# Patient Record
Sex: MALE | Race: WHITE | NOT HISPANIC OR LATINO | Employment: OTHER | URBAN - NONMETROPOLITAN AREA
[De-identification: names, ages, dates, MRNs, and addresses within clinical notes are randomized per-mention and may not be internally consistent; named-entity substitution may affect disease eponyms.]

---

## 2021-02-02 ENCOUNTER — APPOINTMENT (OUTPATIENT)
Dept: GENERAL RADIOLOGY | Facility: HOSPITAL | Age: 71
End: 2021-02-02

## 2021-02-02 ENCOUNTER — HOSPITAL ENCOUNTER (INPATIENT)
Facility: HOSPITAL | Age: 71
LOS: 14 days | Discharge: NURSING, REHAB OR LONG-TERM CARE | End: 2021-02-16
Attending: INTERNAL MEDICINE | Admitting: FAMILY MEDICINE

## 2021-02-02 ENCOUNTER — APPOINTMENT (OUTPATIENT)
Dept: CARDIOLOGY | Facility: HOSPITAL | Age: 71
End: 2021-02-02

## 2021-02-02 DIAGNOSIS — Z74.09 IMPAIRED MOBILITY: ICD-10-CM

## 2021-02-02 PROBLEM — J12.82 PNEUMONIA DUE TO COVID-19 VIRUS: Status: ACTIVE | Noted: 2021-02-02

## 2021-02-02 PROBLEM — I10 ESSENTIAL HYPERTENSION: Status: ACTIVE | Noted: 2021-02-02

## 2021-02-02 PROBLEM — C18.9 COLON CANCER (HCC): Status: ACTIVE | Noted: 2017-03-06

## 2021-02-02 PROBLEM — E87.6 HYPOKALEMIA: Status: ACTIVE | Noted: 2017-10-04

## 2021-02-02 PROBLEM — J96.01 ACUTE RESPIRATORY FAILURE WITH HYPOXIA (HCC): Status: ACTIVE | Noted: 2021-02-02

## 2021-02-02 PROBLEM — U07.1 PNEUMONIA DUE TO COVID-19 VIRUS: Status: ACTIVE | Noted: 2021-02-02

## 2021-02-02 LAB
ABO GROUP BLD: NORMAL
ALBUMIN SERPL-MCNC: 3.5 G/DL (ref 3.5–5.2)
ALBUMIN/GLOB SERPL: 1 G/DL
ALP SERPL-CCNC: 58 U/L (ref 39–117)
ALT SERPL W P-5'-P-CCNC: 23 U/L (ref 1–41)
ANION GAP SERPL CALCULATED.3IONS-SCNC: 9 MMOL/L (ref 5–15)
ARTERIAL PATENCY WRIST A: POSITIVE
AST SERPL-CCNC: 31 U/L (ref 1–40)
ATMOSPHERIC PRESS: 757 MMHG
BASE EXCESS BLDA CALC-SCNC: 6.3 MMOL/L (ref 0–2)
BASOPHILS # BLD AUTO: 0.01 10*3/MM3 (ref 0–0.2)
BASOPHILS NFR BLD AUTO: 0.1 % (ref 0–1.5)
BDY SITE: ABNORMAL
BILIRUB CONJ SERPL-MCNC: <0.2 MG/DL (ref 0–0.3)
BILIRUB SERPL-MCNC: 0.3 MG/DL (ref 0–1.2)
BLD GP AB SCN SERPL QL: NEGATIVE
BODY TEMPERATURE: 37 C
BUN SERPL-MCNC: 20 MG/DL (ref 8–23)
BUN/CREAT SERPL: 25.3 (ref 7–25)
CA-I BLD-MCNC: 4.44 MG/DL (ref 4.6–5.4)
CALCIUM SPEC-SCNC: 8.9 MG/DL (ref 8.6–10.5)
CHLORIDE SERPL-SCNC: 97 MMOL/L (ref 98–107)
CO2 SERPL-SCNC: 31 MMOL/L (ref 22–29)
CREAT SERPL-MCNC: 0.79 MG/DL (ref 0.76–1.27)
CRP SERPL-MCNC: 29.57 MG/DL (ref 0–0.5)
D DIMER PPP FEU-MCNC: 0.79 MG/L (FEU) (ref 0–0.5)
D-LACTATE SERPL-SCNC: 1.2 MMOL/L (ref 0.5–2)
DEPRECATED RDW RBC AUTO: 47.6 FL (ref 37–54)
EOSINOPHIL # BLD AUTO: 0 10*3/MM3 (ref 0–0.4)
EOSINOPHIL NFR BLD AUTO: 0 % (ref 0.3–6.2)
ERYTHROCYTE [DISTWIDTH] IN BLOOD BY AUTOMATED COUNT: 14.1 % (ref 12.3–15.4)
FERRITIN SERPL-MCNC: 525 NG/ML (ref 30–400)
GAS FLOW AIRWAY: 15 LPM
GFR SERPL CREATININE-BSD FRML MDRD: 96 ML/MIN/1.73
GLOBULIN UR ELPH-MCNC: 3.4 GM/DL
GLUCOSE SERPL-MCNC: 121 MG/DL (ref 65–99)
HBA1C MFR BLD: 5.7 % (ref 4.8–5.6)
HCO3 BLDA-SCNC: 30.7 MMOL/L (ref 20–26)
HCT VFR BLD AUTO: 48.8 % (ref 37.5–51)
HGB BLD-MCNC: 16.1 G/DL (ref 13–17.7)
IMM GRANULOCYTES # BLD AUTO: 0.06 10*3/MM3 (ref 0–0.05)
IMM GRANULOCYTES NFR BLD AUTO: 0.7 % (ref 0–0.5)
L PNEUMO1 AG UR QL IA: NEGATIVE
LDH SERPL-CCNC: 387 U/L (ref 135–225)
LYMPHOCYTES # BLD AUTO: 0.6 10*3/MM3 (ref 0.7–3.1)
LYMPHOCYTES NFR BLD AUTO: 7.4 % (ref 19.6–45.3)
Lab: ABNORMAL
Lab: ABNORMAL
MAGNESIUM SERPL-MCNC: 2.3 MG/DL (ref 1.6–2.4)
MCH RBC QN AUTO: 30.2 PG (ref 26.6–33)
MCHC RBC AUTO-ENTMCNC: 33 G/DL (ref 31.5–35.7)
MCV RBC AUTO: 91.6 FL (ref 79–97)
MODALITY: ABNORMAL
MONOCYTES # BLD AUTO: 0.2 10*3/MM3 (ref 0.1–0.9)
MONOCYTES NFR BLD AUTO: 2.5 % (ref 5–12)
NEUTROPHILS NFR BLD AUTO: 7.2 10*3/MM3 (ref 1.7–7)
NEUTROPHILS NFR BLD AUTO: 89.3 % (ref 42.7–76)
NRBC BLD AUTO-RTO: 0 /100 WBC (ref 0–0.2)
NT-PROBNP SERPL-MCNC: 298.8 PG/ML (ref 0–900)
PCO2 BLDA: 42.2 MM HG (ref 35–45)
PCO2 TEMP ADJ BLD: 42.2 MM HG (ref 35–45)
PH BLDA: 7.47 PH UNITS (ref 7.35–7.45)
PH, TEMP CORRECTED: 7.47 PH UNITS (ref 7.35–7.45)
PLATELET # BLD AUTO: 199 10*3/MM3 (ref 140–450)
PMV BLD AUTO: 10.1 FL (ref 6–12)
PO2 BLDA: 84.5 MM HG (ref 83–108)
PO2 TEMP ADJ BLD: 84.5 MM HG (ref 83–108)
POTASSIUM SERPL-SCNC: 4 MMOL/L (ref 3.5–5.2)
PROCALCITONIN SERPL-MCNC: 0.24 NG/ML (ref 0–0.25)
PROT SERPL-MCNC: 6.9 G/DL (ref 6–8.5)
RBC # BLD AUTO: 5.33 10*6/MM3 (ref 4.14–5.8)
RH BLD: POSITIVE
S PNEUM AG SPEC QL LA: NEGATIVE
SAO2 % BLDCOA: 97 % (ref 94–99)
SODIUM SERPL-SCNC: 137 MMOL/L (ref 136–145)
T&S EXPIRATION DATE: NORMAL
TROPONIN T SERPL-MCNC: <0.01 NG/ML (ref 0–0.03)
TSH SERPL DL<=0.05 MIU/L-ACNC: 0.71 UIU/ML (ref 0.27–4.2)
VENTILATOR MODE: ABNORMAL
WBC # BLD AUTO: 8.07 10*3/MM3 (ref 3.4–10.8)

## 2021-02-02 PROCEDURE — 85379 FIBRIN DEGRADATION QUANT: CPT | Performed by: INTERNAL MEDICINE

## 2021-02-02 PROCEDURE — 87040 BLOOD CULTURE FOR BACTERIA: CPT | Performed by: INTERNAL MEDICINE

## 2021-02-02 PROCEDURE — 84484 ASSAY OF TROPONIN QUANT: CPT | Performed by: INTERNAL MEDICINE

## 2021-02-02 PROCEDURE — 83880 ASSAY OF NATRIURETIC PEPTIDE: CPT | Performed by: INTERNAL MEDICINE

## 2021-02-02 PROCEDURE — 25010000002 PERFLUTREN 6.52 MG/ML SUSPENSION: Performed by: FAMILY MEDICINE

## 2021-02-02 PROCEDURE — 83036 HEMOGLOBIN GLYCOSYLATED A1C: CPT | Performed by: INTERNAL MEDICINE

## 2021-02-02 PROCEDURE — 82728 ASSAY OF FERRITIN: CPT | Performed by: INTERNAL MEDICINE

## 2021-02-02 PROCEDURE — 84145 PROCALCITONIN (PCT): CPT | Performed by: INTERNAL MEDICINE

## 2021-02-02 PROCEDURE — 86140 C-REACTIVE PROTEIN: CPT | Performed by: INTERNAL MEDICINE

## 2021-02-02 PROCEDURE — 80053 COMPREHEN METABOLIC PANEL: CPT | Performed by: INTERNAL MEDICINE

## 2021-02-02 PROCEDURE — 83615 LACTATE (LD) (LDH) ENZYME: CPT | Performed by: INTERNAL MEDICINE

## 2021-02-02 PROCEDURE — 86901 BLOOD TYPING SEROLOGIC RH(D): CPT | Performed by: INTERNAL MEDICINE

## 2021-02-02 PROCEDURE — 25010000002 ENOXAPARIN PER 10 MG: Performed by: INTERNAL MEDICINE

## 2021-02-02 PROCEDURE — 86850 RBC ANTIBODY SCREEN: CPT | Performed by: INTERNAL MEDICINE

## 2021-02-02 PROCEDURE — 83605 ASSAY OF LACTIC ACID: CPT | Performed by: INTERNAL MEDICINE

## 2021-02-02 PROCEDURE — 94799 UNLISTED PULMONARY SVC/PX: CPT

## 2021-02-02 PROCEDURE — 36600 WITHDRAWAL OF ARTERIAL BLOOD: CPT

## 2021-02-02 PROCEDURE — 63710000001 DEXAMETHASONE PER 0.25 MG: Performed by: INTERNAL MEDICINE

## 2021-02-02 PROCEDURE — 71045 X-RAY EXAM CHEST 1 VIEW: CPT

## 2021-02-02 PROCEDURE — 86900 BLOOD TYPING SEROLOGIC ABO: CPT | Performed by: INTERNAL MEDICINE

## 2021-02-02 PROCEDURE — 87070 CULTURE OTHR SPECIMN AEROBIC: CPT | Performed by: INTERNAL MEDICINE

## 2021-02-02 PROCEDURE — 94640 AIRWAY INHALATION TREATMENT: CPT

## 2021-02-02 PROCEDURE — 82330 ASSAY OF CALCIUM: CPT

## 2021-02-02 PROCEDURE — 25010000002 FUROSEMIDE PER 20 MG: Performed by: FAMILY MEDICINE

## 2021-02-02 PROCEDURE — 87205 SMEAR GRAM STAIN: CPT | Performed by: INTERNAL MEDICINE

## 2021-02-02 PROCEDURE — 87899 AGENT NOS ASSAY W/OPTIC: CPT | Performed by: INTERNAL MEDICINE

## 2021-02-02 PROCEDURE — 82803 BLOOD GASES ANY COMBINATION: CPT

## 2021-02-02 PROCEDURE — 82248 BILIRUBIN DIRECT: CPT | Performed by: INTERNAL MEDICINE

## 2021-02-02 PROCEDURE — 83735 ASSAY OF MAGNESIUM: CPT | Performed by: INTERNAL MEDICINE

## 2021-02-02 PROCEDURE — 93306 TTE W/DOPPLER COMPLETE: CPT | Performed by: INTERNAL MEDICINE

## 2021-02-02 PROCEDURE — 87581 M.PNEUMON DNA AMP PROBE: CPT | Performed by: INTERNAL MEDICINE

## 2021-02-02 PROCEDURE — 93306 TTE W/DOPPLER COMPLETE: CPT

## 2021-02-02 PROCEDURE — 84443 ASSAY THYROID STIM HORMONE: CPT | Performed by: INTERNAL MEDICINE

## 2021-02-02 PROCEDURE — 85025 COMPLETE CBC W/AUTO DIFF WBC: CPT | Performed by: INTERNAL MEDICINE

## 2021-02-02 PROCEDURE — XW13325 TRANSFUSION OF CONVALESCENT PLASMA (NONAUTOLOGOUS) INTO PERIPHERAL VEIN, PERCUTANEOUS APPROACH, NEW TECHNOLOGY GROUP 5: ICD-10-PCS | Performed by: INTERNAL MEDICINE

## 2021-02-02 RX ORDER — BENZONATATE 100 MG/1
200 CAPSULE ORAL EVERY 4 HOURS PRN
Status: DISCONTINUED | OUTPATIENT
Start: 2021-02-02 | End: 2021-02-16 | Stop reason: HOSPADM

## 2021-02-02 RX ORDER — ATORVASTATIN CALCIUM 10 MG/1
10 TABLET, FILM COATED ORAL NIGHTLY
Status: DISCONTINUED | OUTPATIENT
Start: 2021-02-02 | End: 2021-02-16 | Stop reason: HOSPADM

## 2021-02-02 RX ORDER — ACETAMINOPHEN 650 MG/1
650 SUPPOSITORY RECTAL EVERY 4 HOURS PRN
Status: DISCONTINUED | OUTPATIENT
Start: 2021-02-02 | End: 2021-02-16 | Stop reason: HOSPADM

## 2021-02-02 RX ORDER — MULTIVIT AND MINERALS-FERROUS GLUCONATE 9 MG IRON/15 ML ORAL LIQUID 9 MG/15 ML
15 LIQUID (ML) ORAL DAILY
Status: DISCONTINUED | OUTPATIENT
Start: 2021-02-02 | End: 2021-02-16 | Stop reason: HOSPADM

## 2021-02-02 RX ORDER — ASPIRIN 81 MG/1
81 TABLET, CHEWABLE ORAL DAILY
Status: DISCONTINUED | OUTPATIENT
Start: 2021-02-02 | End: 2021-02-16 | Stop reason: HOSPADM

## 2021-02-02 RX ORDER — MELATONIN
1000 DAILY
Status: DISCONTINUED | OUTPATIENT
Start: 2021-02-02 | End: 2021-02-16 | Stop reason: HOSPADM

## 2021-02-02 RX ORDER — ZINC SULFATE 50(220)MG
220 CAPSULE ORAL DAILY
Status: DISCONTINUED | OUTPATIENT
Start: 2021-02-02 | End: 2021-02-16 | Stop reason: HOSPADM

## 2021-02-02 RX ORDER — AMLODIPINE BESYLATE 5 MG/1
5 TABLET ORAL
Status: DISCONTINUED | OUTPATIENT
Start: 2021-02-02 | End: 2021-02-02

## 2021-02-02 RX ORDER — AZITHROMYCIN 250 MG/1
TABLET, FILM COATED ORAL
Status: ON HOLD | COMMUNITY
Start: 2021-01-25 | End: 2021-02-05

## 2021-02-02 RX ORDER — FUROSEMIDE 10 MG/ML
20 INJECTION INTRAMUSCULAR; INTRAVENOUS EVERY 12 HOURS
Status: DISCONTINUED | OUTPATIENT
Start: 2021-02-02 | End: 2021-02-03

## 2021-02-02 RX ORDER — VERAPAMIL HYDROCHLORIDE 180 MG/1
180 CAPSULE, EXTENDED RELEASE ORAL NIGHTLY
Status: ON HOLD | COMMUNITY
End: 2021-02-05

## 2021-02-02 RX ORDER — LANOLIN ALCOHOL/MO/W.PET/CERES
3 CREAM (GRAM) TOPICAL NIGHTLY
Status: DISCONTINUED | OUTPATIENT
Start: 2021-02-02 | End: 2021-02-16 | Stop reason: HOSPADM

## 2021-02-02 RX ORDER — POLYETHYLENE GLYCOL 3350 17 G/17G
17 POWDER, FOR SOLUTION ORAL DAILY
Status: DISCONTINUED | OUTPATIENT
Start: 2021-02-02 | End: 2021-02-16 | Stop reason: HOSPADM

## 2021-02-02 RX ORDER — FAMOTIDINE 20 MG/1
20 TABLET, FILM COATED ORAL
Status: DISCONTINUED | OUTPATIENT
Start: 2021-02-02 | End: 2021-02-16 | Stop reason: HOSPADM

## 2021-02-02 RX ORDER — ASCORBIC ACID 500 MG
500 TABLET ORAL DAILY
Status: DISCONTINUED | OUTPATIENT
Start: 2021-02-02 | End: 2021-02-03

## 2021-02-02 RX ORDER — ASPIRIN 81 MG/1
81 TABLET, CHEWABLE ORAL DAILY
COMMUNITY

## 2021-02-02 RX ORDER — ALBUTEROL SULFATE 90 UG/1
2 AEROSOL, METERED RESPIRATORY (INHALATION) EVERY 4 HOURS PRN
COMMUNITY
Start: 2021-01-29

## 2021-02-02 RX ORDER — ONDANSETRON 2 MG/ML
4 INJECTION INTRAMUSCULAR; INTRAVENOUS EVERY 6 HOURS PRN
Status: DISCONTINUED | OUTPATIENT
Start: 2021-02-02 | End: 2021-02-16 | Stop reason: HOSPADM

## 2021-02-02 RX ORDER — DEXAMETHASONE SODIUM PHOSPHATE 4 MG/ML
6 INJECTION, SOLUTION INTRA-ARTICULAR; INTRALESIONAL; INTRAMUSCULAR; INTRAVENOUS; SOFT TISSUE
Status: DISCONTINUED | OUTPATIENT
Start: 2021-02-02 | End: 2021-02-04

## 2021-02-02 RX ORDER — LABETALOL HYDROCHLORIDE 5 MG/ML
10 INJECTION, SOLUTION INTRAVENOUS EVERY 4 HOURS PRN
Status: DISCONTINUED | OUTPATIENT
Start: 2021-02-02 | End: 2021-02-16 | Stop reason: HOSPADM

## 2021-02-02 RX ORDER — DIPHENOXYLATE HYDROCHLORIDE AND ATROPINE SULFATE 2.5; .025 MG/1; MG/1
1 TABLET ORAL DAILY
COMMUNITY

## 2021-02-02 RX ORDER — ACETAMINOPHEN 325 MG/1
650 TABLET ORAL EVERY 4 HOURS PRN
Status: DISCONTINUED | OUTPATIENT
Start: 2021-02-02 | End: 2021-02-16 | Stop reason: HOSPADM

## 2021-02-02 RX ORDER — ALBUTEROL SULFATE 90 UG/1
2 AEROSOL, METERED RESPIRATORY (INHALATION)
Status: DISCONTINUED | OUTPATIENT
Start: 2021-02-02 | End: 2021-02-16 | Stop reason: HOSPADM

## 2021-02-02 RX ORDER — SODIUM CHLORIDE 0.9 % (FLUSH) 0.9 %
10 SYRINGE (ML) INJECTION AS NEEDED
Status: DISCONTINUED | OUTPATIENT
Start: 2021-02-02 | End: 2021-02-16 | Stop reason: HOSPADM

## 2021-02-02 RX ORDER — VERAPAMIL HYDROCHLORIDE 180 MG/1
180 CAPSULE, EXTENDED RELEASE ORAL EVERY 24 HOURS
Status: DISCONTINUED | OUTPATIENT
Start: 2021-02-02 | End: 2021-02-02

## 2021-02-02 RX ORDER — DOCUSATE SODIUM 100 MG/1
100 CAPSULE, LIQUID FILLED ORAL DAILY
Status: DISCONTINUED | OUTPATIENT
Start: 2021-02-02 | End: 2021-02-16 | Stop reason: HOSPADM

## 2021-02-02 RX ORDER — DEXAMETHASONE 4 MG/1
TABLET ORAL
Status: ON HOLD | COMMUNITY
Start: 2021-02-01 | End: 2021-02-05

## 2021-02-02 RX ORDER — DOXYCYCLINE HYCLATE 100 MG
100 TABLET ORAL
Status: ON HOLD | COMMUNITY
Start: 2021-02-01 | End: 2021-02-05

## 2021-02-02 RX ORDER — VERAPAMIL HYDROCHLORIDE 180 MG/1
180 CAPSULE, EXTENDED RELEASE ORAL EVERY 24 HOURS
Status: DISCONTINUED | OUTPATIENT
Start: 2021-02-02 | End: 2021-02-02 | Stop reason: SDUPTHER

## 2021-02-02 RX ORDER — ONDANSETRON 4 MG/1
4 TABLET, FILM COATED ORAL EVERY 6 HOURS PRN
Status: DISCONTINUED | OUTPATIENT
Start: 2021-02-02 | End: 2021-02-16 | Stop reason: HOSPADM

## 2021-02-02 RX ORDER — HYDROCHLOROTHIAZIDE 25 MG/1
25 TABLET ORAL DAILY
COMMUNITY

## 2021-02-02 RX ORDER — SODIUM CHLORIDE 0.9 % (FLUSH) 0.9 %
10 SYRINGE (ML) INJECTION EVERY 12 HOURS SCHEDULED
Status: DISCONTINUED | OUTPATIENT
Start: 2021-02-02 | End: 2021-02-16 | Stop reason: HOSPADM

## 2021-02-02 RX ORDER — DEXTROMETHORPHAN POLISTIREX 30 MG/5ML
60 SUSPENSION ORAL EVERY 12 HOURS PRN
Status: DISCONTINUED | OUTPATIENT
Start: 2021-02-02 | End: 2021-02-16 | Stop reason: HOSPADM

## 2021-02-02 RX ORDER — HYDROCHLOROTHIAZIDE 25 MG/1
25 TABLET ORAL DAILY
Status: DISCONTINUED | OUTPATIENT
Start: 2021-02-02 | End: 2021-02-16 | Stop reason: HOSPADM

## 2021-02-02 RX ADMIN — OXYCODONE HYDROCHLORIDE AND ACETAMINOPHEN 500 MG: 500 TABLET ORAL at 08:17

## 2021-02-02 RX ADMIN — VERAPAMIL HYDROCHLORIDE 180 MG: 180 TABLET, FILM COATED, EXTENDED RELEASE ORAL at 08:17

## 2021-02-02 RX ADMIN — FAMOTIDINE 20 MG: 20 TABLET, FILM COATED ORAL at 08:17

## 2021-02-02 RX ADMIN — REMDESIVIR 200 MG: 100 INJECTION, POWDER, LYOPHILIZED, FOR SOLUTION INTRAVENOUS at 04:47

## 2021-02-02 RX ADMIN — SODIUM CHLORIDE, PRESERVATIVE FREE 10 ML: 5 INJECTION INTRAVENOUS at 20:27

## 2021-02-02 RX ADMIN — Medication 3 MG: at 20:32

## 2021-02-02 RX ADMIN — SODIUM CHLORIDE, PRESERVATIVE FREE 10 ML: 5 INJECTION INTRAVENOUS at 08:17

## 2021-02-02 RX ADMIN — FAMOTIDINE 20 MG: 20 TABLET, FILM COATED ORAL at 16:06

## 2021-02-02 RX ADMIN — THIAMINE HCL TAB 100 MG 100 MG: 100 TAB at 10:06

## 2021-02-02 RX ADMIN — FUROSEMIDE 20 MG: 10 INJECTION, SOLUTION INTRAMUSCULAR; INTRAVENOUS at 14:48

## 2021-02-02 RX ADMIN — ASPIRIN 81 MG: 81 TABLET, CHEWABLE ORAL at 08:17

## 2021-02-02 RX ADMIN — HYDROCHLOROTHIAZIDE 25 MG: 25 TABLET ORAL at 08:17

## 2021-02-02 RX ADMIN — ATORVASTATIN CALCIUM 10 MG: 10 TABLET, FILM COATED ORAL at 20:27

## 2021-02-02 RX ADMIN — Medication 1000 UNITS: at 08:17

## 2021-02-02 RX ADMIN — ALBUTEROL SULFATE 2 PUFF: 90 AEROSOL, METERED RESPIRATORY (INHALATION) at 13:48

## 2021-02-02 RX ADMIN — DEXAMETHASONE 6 MG: 4 TABLET ORAL at 08:17

## 2021-02-02 RX ADMIN — ACETAMINOPHEN 650 MG: 325 TABLET, FILM COATED ORAL at 22:03

## 2021-02-02 RX ADMIN — ALBUTEROL SULFATE 2 PUFF: 90 AEROSOL, METERED RESPIRATORY (INHALATION) at 10:18

## 2021-02-02 RX ADMIN — ENOXAPARIN SODIUM 40 MG: 40 INJECTION SUBCUTANEOUS at 08:17

## 2021-02-02 RX ADMIN — DOCUSATE SODIUM 100 MG: 100 CAPSULE ORAL at 16:06

## 2021-02-02 RX ADMIN — Medication 15 ML: at 08:17

## 2021-02-02 RX ADMIN — ALBUTEROL SULFATE 2 PUFF: 90 AEROSOL, METERED RESPIRATORY (INHALATION) at 18:56

## 2021-02-02 RX ADMIN — ZINC SULFATE 220 MG (50 MG) CAPSULE 220 MG: CAPSULE at 08:17

## 2021-02-02 RX ADMIN — PERFLUTREN 8.48 MG: 6.52 INJECTION, SUSPENSION INTRAVENOUS at 15:39

## 2021-02-02 RX ADMIN — ALBUTEROL SULFATE 2 PUFF: 90 AEROSOL, METERED RESPIRATORY (INHALATION) at 06:57

## 2021-02-02 RX ADMIN — POLYETHYLENE GLYCOL (3350) 17 G: 17 POWDER, FOR SOLUTION ORAL at 16:06

## 2021-02-02 NOTE — PROGRESS NOTES
[facility] Medicine Services  INPATIENT PROGRESS NOTE    Length of Stay: 0  Date of Admission: 2/2/2021  Primary Care Physician: Meredith Lloyd APRN    Subjective   Chief Complaint: Respiratory failure with hypoxia.  Covid pneumonia.    HPI   Patient breathing is worse.  Patient currently requiring Vapotherm.  Patient denies any chest pain.    Review of Systems   Constitutional: Positive for activity change. Negative for appetite change, chills and fever.   HENT: Negative for hearing loss, nosebleeds, tinnitus and trouble swallowing.    Eyes: Negative for visual disturbance.   Respiratory: Positive for cough and shortness of breath. Negative for chest tightness and wheezing.    Cardiovascular: Negative for chest pain, palpitations and leg swelling.   Gastrointestinal: Negative for abdominal distention, abdominal pain, blood in stool, constipation, diarrhea, nausea and vomiting.   Endocrine: Negative for cold intolerance, heat intolerance, polydipsia, polyphagia and polyuria.   Genitourinary: Negative for decreased urine volume, difficulty urinating, dysuria, flank pain, frequency and hematuria.   Musculoskeletal: Negative for arthralgias, joint swelling and myalgias.   Skin: Negative for rash.   Allergic/Immunologic: Negative for immunocompromised state.   Neurological: Positive for weakness. Negative for dizziness, syncope, light-headedness and headaches.   Hematological: Negative for adenopathy. Does not bruise/bleed easily.   Psychiatric/Behavioral: Negative for confusion and sleep disturbance. The patient is not nervous/anxious.           All pertinent negatives and positives are as above. All other systems have been reviewed and are negative unless otherwise stated.     Objective    Temp:  [96.8 °F (36 °C)-97.7 °F (36.5 °C)] 96.8 °F (36 °C)  Heart Rate:  [69-90] 89  Resp:  [20-22] 21  BP: (136-188)/() 169/101    Intake/Output Summary (Last 24 hours) at 2/2/2021  0854  Last data filed at 2/2/2021 0818  Gross per 24 hour   Intake --   Output 450 ml   Net -450 ml     Physical Exam  Vitals signs and nursing note reviewed.   Constitutional:       Appearance: He is well-developed.   HENT:      Head: Normocephalic and atraumatic.   Eyes:      General: No scleral icterus.     Pupils: Pupils are equal, round, and reactive to light.   Neck:      Musculoskeletal: Normal range of motion and neck supple.      Thyroid: No thyromegaly.      Vascular: No carotid bruit or JVD.      Trachea: No tracheal deviation.   Cardiovascular:      Rate and Rhythm: Normal rate and regular rhythm.      Heart sounds: No murmur. No friction rub. No gallop.    Pulmonary:      Effort: Pulmonary effort is normal. No respiratory distress.      Breath sounds: Normal breath sounds. No wheezing or rales.   Chest:      Chest wall: No tenderness.   Abdominal:      General: Bowel sounds are normal. There is no distension.      Palpations: Abdomen is soft.      Tenderness: There is no abdominal tenderness.      Comments: Obesity.   Musculoskeletal: Normal range of motion.   Skin:     General: Skin is warm and dry.      Capillary Refill: Capillary refill takes 2 to 3 seconds.      Findings: No rash.   Neurological:      Mental Status: He is alert and oriented to person, place, and time.      Cranial Nerves: No cranial nerve deficit.   Psychiatric:         Behavior: Behavior normal.         Results Review:  Lab Results (last 24 hours)     Procedure Component Value Units Date/Time    Respiratory Culture - Sputum, Cough [[id number]] Collected: 02/02/21 0357    Specimen: Sputum from Cough Updated: 02/02/21 0818     Gram Stain Less than 25 WBCs per low power field      Few (2+) Epithelial cells per low power field      Few (2+) Mixed gram positive cb      Few (2+) Gram negative bacilli    Blood Culture - Blood, Arm, Left [[id number]] Collected: 02/02/21 0525    Specimen: Blood from Arm, Left Updated: 02/02/21 0603     "Blood Culture - Blood, Arm, Right [[id number]] Collected: 02/02/21 0525    Specimen: Blood from Arm, Right Updated: 02/02/21 0638    TSH [[id number]]  (Normal) Collected: 02/02/21 0424    Specimen: Blood Updated: 02/02/21 0512     TSH 0.715 uIU/mL     Procalcitonin [[id number]]  (Normal) Collected: 02/02/21 0424    Specimen: Blood Updated: 02/02/21 0511     Procalcitonin 0.24 ng/mL     Narrative:      As a Marker for Sepsis (Non-Neonates):   1. <0.5 ng/mL represents a low risk of severe sepsis and/or septic shock.  1. >2 ng/mL represents a high risk of severe sepsis and/or septic shock.    As a Marker for Lower Respiratory Tract Infections that require antibiotic therapy:  PCT on Admission     Antibiotic Therapy             6-12 Hrs later  > 0.5                Strongly Recommended            >0.25 - <0.5         Recommended  0.1 - 0.25           Discouraged                   Remeasure/reassess PCT  <0.1                 Strongly Discouraged          Remeasure/reassess PCT      As 28 day mortality risk marker: \"Change in Procalcitonin Result\" (> 80 % or <=80 %) if Day 0 (or Day 1) and Day 4 values are available. Refer to http://[email or web address]-pct-calculator.com/   Change in PCT <=80 %   A decrease of PCT levels below or equal to 80 % defines a positive change in PCT test result representing a higher risk for 28-day all-cause mortality of patients diagnosed with severe sepsis or septic shock.  Change in PCT > 80 %   A decrease of PCT levels of more than 80 % defines a negative change in PCT result representing a lower risk for 28-day all-cause mortality of patients diagnosed with severe sepsis or septic shock.                Results may be falsely decreased if patient taking Biotin.     Ferritin [[id number]]  (Abnormal) Collected: 02/02/21 0424    Specimen: Blood Updated: 02/02/21 0508     Ferritin 525.00 ng/mL     Narrative:      Results may be falsely decreased if patient taking Biotin.      Comprehensive Metabolic Panel " [[id number]]  (Abnormal) Collected: 02/02/21 0424    Specimen: Blood Updated: 02/02/21 0506     Glucose 121 mg/dL      BUN 20 mg/dL      Creatinine 0.79 mg/dL      Sodium 137 mmol/L      Potassium 4.0 mmol/L      Chloride 97 mmol/L      CO2 31.0 mmol/L      Calcium 8.9 mg/dL      Total Protein 6.9 g/dL      Albumin 3.50 g/dL      ALT (SGPT) 23 U/L      AST (SGOT) 31 U/L      Alkaline Phosphatase 58 U/L      Total Bilirubin 0.3 mg/dL      eGFR Non African Amer 96 mL/min/1.73      Globulin 3.4 gm/dL      A/G Ratio 1.0 g/dL      BUN/Creatinine Ratio 25.3     Anion Gap 9.0 mmol/L     Narrative:      GFR Normal >60  Chronic Kidney Disease <60  Kidney Failure <15      C-reactive Protein [[id number]]  (Abnormal) Collected: 02/02/21 0424    Specimen: Blood Updated: 02/02/21 0505     C-Reactive Protein 29.57 mg/dL     Lactate Dehydrogenase [[id number]]  (Abnormal) Collected: 02/02/21 0424    Specimen: Blood Updated: 02/02/21 0505      U/L     Bilirubin, Direct [[id number]]  (Normal) Collected: 02/02/21 0424    Specimen: Blood Updated: 02/02/21 0505     Bilirubin, Direct <0.2 mg/dL     Lactic Acid, Plasma [[id number]]  (Normal) Collected: 02/02/21 0424    Specimen: Blood Updated: 02/02/21 0504     Lactate 1.2 mmol/L     Troponin [[id number]]  (Normal) Collected: 02/02/21 0424    Specimen: Blood Updated: 02/02/21 0501     Troponin T <0.010 ng/mL     Narrative:      Troponin T Reference Range:  <= 0.03 ng/mL-   Negative for AMI  >0.03 ng/mL-     Abnormal for myocardial necrosis.  Clinicians would have to utilize clinical acumen, EKG, Troponin and serial changes to determine if it is an Acute Myocardial Infarction or myocardial injury due to an underlying chronic condition.       Results may be falsely decreased if patient taking Biotin.      BNP [[id number]]  (Normal) Collected: 02/02/21 0424    Specimen: Blood Updated: 02/02/21 0500     proBNP 298.8 pg/mL     Narrative:      Among patients with dyspnea, NT-proBNP is highly  sensitive for the detection of acute congestive heart failure. In addition NT-proBNP of <300 pg/ml effectively rules out acute congestive heart failure with 99% negative predictive value.    Results may be falsely decreased if patient taking Biotin.      Magnesium [[id number]]  (Normal) Collected: 02/02/21 0424    Specimen: Blood Updated: 02/02/21 0500     Magnesium 2.3 mg/dL     D-dimer, Quantitative [[id number]]  (Abnormal) Collected: 02/02/21 0424    Specimen: Blood Updated: 02/02/21 0456     D-Dimer, Quantitative 0.79 mg/L (FEU)     Narrative:      Reference Range is 0-0.50 mg/L FEU. However, results <0.50 mg/L FEU tends to rule out DVT or PE. Results >0.50 mg/L FEU are not useful in predicting absence or presence of DVT or PE.      Hemoglobin A1c [[id number]]  (Abnormal) Collected: 02/02/21 0424    Specimen: Blood Updated: 02/02/21 0453     Hemoglobin A1C 5.70 %     Narrative:      Hemoglobin A1C Ranges:    Increased Risk for Diabetes  5.7% to 6.4%  Diabetes                     >= 6.5%  Diabetic Goal                < 7.0%    CBC Auto Differential [[id number]]  (Abnormal) Collected: 02/02/21 0424    Specimen: Blood Updated: 02/02/21 0443     WBC 8.07 10*3/mm3      RBC 5.33 10*6/mm3      Hemoglobin 16.1 g/dL      Hematocrit 48.8 %      MCV 91.6 fL      MCH 30.2 pg      MCHC 33.0 g/dL      RDW 14.1 %      RDW-SD 47.6 fl      MPV 10.1 fL      Platelets 199 10*3/mm3      Neutrophil % 89.3 %      Lymphocyte % 7.4 %      Monocyte % 2.5 %      Eosinophil % 0.0 %      Basophil % 0.1 %      Immature Grans % 0.7 %      Neutrophils, Absolute 7.20 10*3/mm3      Lymphocytes, Absolute 0.60 10*3/mm3      Monocytes, Absolute 0.20 10*3/mm3      Eosinophils, Absolute 0.00 10*3/mm3      Basophils, Absolute 0.01 10*3/mm3      Immature Grans, Absolute 0.06 10*3/mm3      nRBC 0.0 /100 WBC     Mycoplasma Pneumoniae PCR - Swab, Nasopharynx [[id number]] Collected: 02/02/21 0411    Specimen: Swab from Nasopharynx Updated: 02/02/21 0434     Legionella Antigen, Urine - Urine, Urine, Clean Catch [[id number]]  (Normal) Collected: 02/02/21 0355    Specimen: Urine, Clean Catch Updated: 02/02/21 0431     LEGIONELLA ANTIGEN, URINE Negative    S. Pneumo Ag Urine or CSF - Urine, Urine, Clean Catch [[id number]]  (Normal) Collected: 02/02/21 0355    Specimen: Urine, Clean Catch Updated: 02/02/21 0431     Strep Pneumo Ag Negative    Calcium, Ionized [[id number]]  (Abnormal) Collected: 02/02/21 0416    Specimen: Blood Updated: 02/02/21 0429     Ionized Calcium 4.44 mg/dL      Comment: 84 Value below reference range        Collected by [id number]     Comment: Meter: [id number]     :  [id number]       Blood Gas, Arterial - [[id number]]  (Abnormal) Collected: 02/02/21 0416    Specimen: Arterial Blood Updated: 02/02/21 0429     Site Right Radial     Jose's Test Positive     pH, Arterial 7.471 pH units      Comment: 83 Value above reference range        pCO2, Arterial 42.2 mm Hg      pO2, Arterial 84.5 mm Hg      HCO3, Arterial 30.7 mmol/L      Comment: 83 Value above reference range        Base Excess, Arterial 6.3 mmol/L      Comment: 83 Value above reference range        O2 Saturation, Arterial 97.0 %      Temperature 37.0 C      Barometric Pressure for Blood Gas 757 mmHg      Modality HFNC     Flow Rate 15.0 lpm      Ventilator Mode NA     Collected by [id number]     Comment: Meter: [id number]     :  [id number]        pCO2, Temperature Corrected 42.2 mm Hg      pH, Temp Corrected 7.471 pH Units      pO2, Temperature Corrected 84.5 mm Hg            Cultures:  No results found for: BLOODCX, URINECX, WOUNDCX, MRSACX, RESPCX, STOOLCX    Radiology Data:    Imaging Results (Last 24 Hours)     ** No results found for the last 24 hours. **          No Known Allergies    Scheduled meds:   albuterol sulfate HFA, 2 puff, Inhalation, 4x Daily - RT  ascorbic acid, 500 mg, Oral, Daily  aspirin, 81 mg, Oral, Daily  atorvastatin, 10 mg, Oral, Nightly  cholecalciferol,  1,000 Units, Oral, Daily  dexamethasone, 6 mg, Oral, Daily With Breakfast    Or  dexamethasone, 6 mg, Intravenous, Daily With Breakfast  enoxaparin, 40 mg, Subcutaneous, Q24H  famotidine, 20 mg, Oral, BID AC  hydroCHLOROthiazide, 25 mg, Oral, Daily  melatonin, 3 mg, Oral, Nightly  multivitamin and minerals, 15 mL, Oral, Daily  [START ON 2/3/2021] remdesivir, 100 mg, Intravenous, Q24H  sodium chloride, 10 mL, Intravenous, Q12H  verapamil SR, 180 mg, Oral, Q24H  zinc sulfate, 220 mg, Oral, Daily        PRN meds:  •  acetaminophen **OR** acetaminophen  •  benzonatate  •  dextromethorphan polistirex ER  •  ondansetron **OR** ondansetron  •  Pharmacy Consult - Remdesivir  •  sodium chloride    Assessment/Plan       Essential hypertension    Pneumonia due to COVID-19 virus    Acute respiratory failure with hypoxia (CMS/HCC)      Plan:    COVID-19 pneumonia/respiratory failure with hypoxia.  Previously was on antibiotics and Decadron outpatient.  Zinc.  Vitamin C.  Vitamin D.  Melatonin at night. Continue Decadron.  Continue remdesivir.  S/P Convalescent plasma x1.  Tessalon Perle.  Delsym.  Consult pulmonary.  Consult infectious disease.  CT scan and x-[facility] at [facility] -bilateral infiltrate consistent with Covid.  D-dimer was elevated, no PE per CTA.  Vapotherm 30/95.  Chest x-ray-Bilateral mid to lower lung zone opacities in the background of cardiomegaly- consider fluid overload- Superimposed infection is not excluded.    Hypertension.  Continue verapamil.  Continue hydrochlorthiazide.  Continue Lipitor.  Labetalol as needed.  Echocardiogram-preliminary ejection fraction 64%, pending official radiologist reading.    Reflux.  Pepcid.  Zofran as needed.    Obesity.  BMI is 38.    History colon cancer.    Lovenox prophylaxis.    Nutrition.  Regular/cardiac diet.    Blood cultures-no growth 24 hours.  Legionella antigen-negative.  Respiratory culture pending.  Strep pneumo-negative.   Mycoplasma-pending.    Discharge Planning: 3 to 6 days.    Electronically signed by Aime Rivera MD, 02/02/21, 8:54 AM CST.

## 2021-02-02 NOTE — PLAN OF CARE
Goal Outcome Evaluation:    Pt arrived by air-evac around midnight last night. From report that was given from ED nurse in [place] it was stated the pt was on 6L NC, however when pt arrived to us he was on 15L non-rebreather sating low 90s. Pt currently is on 14L salter high flow nasal cannula sat mid-90s, no complaints of SOB. He does have frequent and productive cough, sputum culture was obtained and sent down to lab. He is very friendly and very cooperative. Alert and oriented x4. Sinus rhythm 70-80s. BP has started becoming more elevated 160-180s, Dr. Orellana reorderd pts home med for hypertension so he should receive that with his morning meds.     Remdesevir has been given, conv. Plasma has been ordered but lab does not have his type so they are having to order that currently, and decadron will be started today.

## 2021-02-02 NOTE — H&P
[facility] Medicine Services  HISTORY AND PHYSICAL    Date of Admission: 2/2/2021  Primary Care Physician: Meredith Lloyd APRN    Subjective     Chief Complaint: Cough shortness of breath    History of Present Illness  Patient is 70-year-old white male past medical history of hypertension arthritis and colon cancer.  He presents with an 8-day history of low-grade fevers cough congestion watery eyes shortness of breath.  He had been treated by his primary care doctor with a believe a Z-Rashid and/or doxycycline or both as well as started on Decadron p.o.  He is also been taking zinc and vitamin C.  He presented to [facility] due to worsening shortness of breath.  His sat was 81% on room air.  He had chest x-rays and CT scans of the chest that showed bilateral interstitial infiltrates consistent with Covid.  D-dimers were elevated but no PEs were noted.  His Covid test is positive.  He was also tested by his physician earlier in the day but the test was not back yet.  Patient denies nausea vomiting or diarrhea he does state he has sense of taste and smell of not changed either.  He has been coughing up brown sputum.  Rest of his laboratory showed a pH of 7.48 PCO2 39 PO2 of 41 bicarb 29 sat 81% on room air.  LDH was elevated at 454 ferritin was elevated 428.  White counts 9.2 hemoglobin 16.1 hematocrit 46 platelets 188,000 with 92 segs 4.2% lymphocytes 2.4% monocytes.  D-dimer is 1.06 (CTA negative for PE) his troponin was negative his AST was 40.  His albumin was low at 2.7 his glucose was 122 BUN slightly elevated at 19 with a creatinine of 0.85 respectively.  Due to the fact they had no ICU beds at the other facility we have been asked accept him in transfer.  Patient is now on 15 L satting about 92%.  He seems to be resting comfortably currently.        Review of Systems   14 point review of systems negative except for as per HPI    Otherwise  "complete ROS reviewed and negative except as mentioned in the HPI.    Past Medical History:   Past Medical History:   Diagnosis Date   • Arthritis    • Cancer (CMS/HCC)    • Colon cancer (CMS/HCC)    • Hypertension    • Skin cancer      Past Surgical History:  Past Surgical History:   Procedure Laterality Date   • COLON SURGERY     • JOINT REPLACEMENT     • SKIN CANCER EXCISION       Social History:  reports that he has never smoked. He quit smokeless tobacco use about 4 years ago.    Family History: family history includes Coronary artery disease in his father; Hypertension in his mother.       Allergies:  No Known Allergies  Medications:  Prior to Admission medications    Medication Sig Start Date End Date Taking? Authorizing Provider   albuterol sulfate  (90 Base) MCG/ACT inhaler  1/29/21  Yes Chriss Tinoco MD   azithromycin (ZITHROMAX) 250 MG tablet Take  by mouth. 1/25/21  Yes Chriss Tinoco MD   dexamethasone (DECADRON) 4 MG tablet  2/1/21  Yes Chriss Tinoco MD   doxycycline (VIBRAMYICN) 100 MG tablet Take 100 mg by mouth. 2/1/21  Yes Chriss Tinoco MD   aspirin (Aspirin Adult Low Strength) 81 MG chewable tablet Daily.    Chriss Tinoco MD   hydroCHLOROthiazide (HYDRODIURIL) 25 MG tablet Take 25 mg by mouth Daily.    Chriss Tinoco MD   multivitamin (THERAGRAN) tablet tablet Take 1 tablet by mouth Daily.    Chriss Tinoco MD   Potassium 75 MG tablet 1 tab    Chriss Tinoco MD   verapamil ER (VERELAN) 180 MG 24 hr capsule Daily.    Chriss Tinoco MD     Objective     Vital Signs: BP (!) 169/104   Pulse 77   Temp 97.4 °F (36.3 °C) (Axillary)   Resp 22   Ht 162.6 cm (64\")   Wt 103 kg (226 lb 10.1 oz)   SpO2 95%   BMI 38.90 kg/m²   Physical Exam   Gen.:  Well-nourished well-developed white male in no acute distress  HEENT: Atraumatic, normocephalic.  Pupils equal, round, and reactive to light. Extraocular movements intact.  Sclerae " anicteric.  External ears negative.  Mucous membranes moist.  Neck is supple without lymphadenopathy.  No JVD is noted.  No carotid bruits are auscultated.  Oropharynx is without erythema or exudate.   Chest: Clear to auscultation and percussion.  CV: Regular rate and rhythm.  Normal S1-S2.  No gallops, murmurs. or rubs.  Abdomen: Soft, nontender, nondistended.  Active bowel sounds,  No hepatosplenomegaly.  No masses.  No hernias.  Extremities: No clubbing, edema, or cyanosis.  Capillary refill is normal.  Pulses are 2+ and symmetric at radial and dorsalis pedis.  Posterior tibial pulses are intact and 2+ palpable.  Neuro: Patient is awake, alert, and oriented ×3.  Cranial nerves II through XII are grossly intact.  Motor is 5 out of 5 bilaterally.  DTRs are 2+ and symmetric bilaterally. Sensory exam is nonfocal  Skin: Warm, dry, and intact.  No evidence of breakdown.  Sensorium: Normal thought and affect    Nursing notes and vital signs reviewed        Results Reviewed:  Lab Results (last 24 hours)     ** No results found for the last 24 hours. **        Imaging Results (Last 24 Hours)     ** No results found for the last 24 hours. **        I have personally reviewed and interpreted the radiology studies and ECG obtained at time of admission.     Assessment / Plan     Assessment:   Active Hospital Problems    Diagnosis   • Pneumonia due to COVID-19 virus   • Acute respiratory failure with hypoxia (CMS/HCC)   • Essential hypertension   1.  Pneumonia due to COVID-19 virus plan is to admit the patient placed in respiratory isolation.  He will be in the ICU for now.  Supplemental O2 will be given.  We will culture blood and sputum.  Will start patient on Decadron as well as remdesivir and give convalescent plasma.  Patient will also be started on atorvastatin, melatonin, Pepcid, vitamin C, vitamin D, and zinc.  Pulmonary toilet with Mucinex and albuterol MDI inhaler.  Consider pulmonary consultation as well as  infectious disease consultation.  2.  Acute respiratory hypoxia supplemental O2 as above plans as outlined above.  3.  Hypertension resume verapamil and HCTZ.  Monitor labs monitor BP closely.      Patient seen after midnight         Code Status: Full, although the patient is considering changing his status he will decide tomorrow.     I discussed the patient's findings and my recommendations with the patient.  He designates his wife is his surrogate healthcare decision maker.    Estimated length of stay greater than 2 nights.    Patient seen and examined by me on February 2, 2021 at 12:30 AM.    Electronically signed by Zac Orellana MD, 02/02/21, 03:11 CST.

## 2021-02-02 NOTE — PROGRESS NOTES
Discharge Planning Assessment  [facility]     Patient Name: Santiago Spence  MRN: [id number]  Today's Date: 2/2/2021    Admit Date: 2/2/2021    Discharge Needs Assessment     Row Name 02/02/21 1117       Living Environment    Lives With  spouse    Name(s) of Who Lives With Patient  Wife- Andra    Current Living Arrangements  home/apartment/condo    Primary Care Provided by  self    Provides Primary Care For  no one    Family Caregiver if Needed  spouse    Quality of Family Relationships  supportive;involved;helpful    Able to Return to Prior Arrangements  yes       Resource/Environmental Concerns    Resource/Environmental Concerns  none       Transition Planning    Patient/Family Anticipates Transition to  home with family    Patient/Family Anticipated Services at Transition  durable medical equipment    Transportation Anticipated  family or friend will provide       Discharge Needs Assessment    Readmission Within the Last 30 Days  no previous admission in last 30 days    Concerns to be Addressed  basic needs    Anticipated Changes Related to Illness  none    Equipment Needed After Discharge  oxygen        Discharge Plan     Row Name 02/02/21 1113       Plan    Plan  Home    Patient/Family in Agreement with Plan  yes    Plan Comments  Spoke with pt's wife- Andra to assess for home needs. Pt will return home with wife as before.  Pt was independent before being diagnosed with Covid.  Pt has RX coverage/PCP.  Pt might need home O2 arranged if still requiring at d/c. Will follow.        Continued Care and Services - Admitted Since 2/2/2021    Coordination has not been started for this encounter.         Demographic Summary    No documentation.       Functional Status    No documentation.       Psychosocial    No documentation.       Abuse/Neglect    No documentation.       Legal    No documentation.       Substance Abuse    No documentation.       Patient Forms    No documentation.           SOFI Patel

## 2021-02-03 LAB
ALBUMIN SERPL-MCNC: 3.3 G/DL (ref 3.5–5.2)
ALBUMIN/GLOB SERPL: 0.9 G/DL
ALP SERPL-CCNC: 57 U/L (ref 39–117)
ALT SERPL W P-5'-P-CCNC: 20 U/L (ref 1–41)
ANION GAP SERPL CALCULATED.3IONS-SCNC: 11 MMOL/L (ref 5–15)
ARTERIAL PATENCY WRIST A: POSITIVE
ARTERIAL PATENCY WRIST A: POSITIVE
AST SERPL-CCNC: 38 U/L (ref 1–40)
ATMOSPHERIC PRESS: 752 MMHG
ATMOSPHERIC PRESS: 756 MMHG
BASE EXCESS BLDA CALC-SCNC: 6 MMOL/L (ref 0–2)
BASE EXCESS BLDA CALC-SCNC: 6.7 MMOL/L (ref 0–2)
BASOPHILS # BLD AUTO: 0.02 10*3/MM3 (ref 0–0.2)
BASOPHILS NFR BLD AUTO: 0.2 % (ref 0–1.5)
BDY SITE: ABNORMAL
BDY SITE: ABNORMAL
BH BB BLOOD EXPIRATION DATE: NORMAL
BH BB BLOOD TYPE BARCODE: 9500
BH BB DISPENSE STATUS: NORMAL
BH BB PRODUCT CODE: NORMAL
BH BB UNIT NUMBER: NORMAL
BH CV ECHO MEAS - AO MAX PG (FULL): 7.3 MMHG
BH CV ECHO MEAS - AO MAX PG: 10.9 MMHG
BH CV ECHO MEAS - AO MEAN PG (FULL): 5 MMHG
BH CV ECHO MEAS - AO MEAN PG: 7 MMHG
BH CV ECHO MEAS - AO ROOT AREA (BSA CORRECTED): 1.7
BH CV ECHO MEAS - AO ROOT AREA: 9.1 CM^2
BH CV ECHO MEAS - AO ROOT DIAM: 3.4 CM
BH CV ECHO MEAS - AO V2 MAX: 165 CM/SEC
BH CV ECHO MEAS - AO V2 MEAN: 121 CM/SEC
BH CV ECHO MEAS - AO V2 VTI: 32 CM
BH CV ECHO MEAS - AVA(I,A): 2.2 CM^2
BH CV ECHO MEAS - AVA(I,D): 2.2 CM^2
BH CV ECHO MEAS - AVA(V,A): 2 CM^2
BH CV ECHO MEAS - AVA(V,D): 2 CM^2
BH CV ECHO MEAS - BSA(HAYCOCK): 2.2 M^2
BH CV ECHO MEAS - BSA: 2.1 M^2
BH CV ECHO MEAS - BZI_BMI: 38.8 KILOGRAMS/M^2
BH CV ECHO MEAS - BZI_METRIC_HEIGHT: 162.6 CM
BH CV ECHO MEAS - BZI_METRIC_WEIGHT: 102.5 KG
BH CV ECHO MEAS - EDV(CUBED): 338.6 ML
BH CV ECHO MEAS - EDV(MOD-SP4): 186 ML
BH CV ECHO MEAS - EDV(TEICH): 253 ML
BH CV ECHO MEAS - EF(CUBED): 52.5 %
BH CV ECHO MEAS - EF(MOD-SP4): 64.3 %
BH CV ECHO MEAS - EF(TEICH): 43.2 %
BH CV ECHO MEAS - ESV(CUBED): 161 ML
BH CV ECHO MEAS - ESV(MOD-SP4): 66.4 ML
BH CV ECHO MEAS - ESV(TEICH): 143.7 ML
BH CV ECHO MEAS - FS: 22 %
BH CV ECHO MEAS - IVS/LVPW: 0.96
BH CV ECHO MEAS - IVSD: 1.4 CM
BH CV ECHO MEAS - LA DIMENSION: 3.9 CM
BH CV ECHO MEAS - LA/AO: 1.1
BH CV ECHO MEAS - LAT PEAK E' VEL: 4.3 CM/SEC
BH CV ECHO MEAS - LV DIASTOLIC VOL/BSA (35-75): 90.3 ML/M^2
BH CV ECHO MEAS - LV MASS(C)D: 490 GRAMS
BH CV ECHO MEAS - LV MASS(C)DI: 237.8 GRAMS/M^2
BH CV ECHO MEAS - LV MAX PG: 3.6 MMHG
BH CV ECHO MEAS - LV MEAN PG: 2 MMHG
BH CV ECHO MEAS - LV SYSTOLIC VOL/BSA (12-30): 32.2 ML/M^2
BH CV ECHO MEAS - LV V1 MAX: 94.6 CM/SEC
BH CV ECHO MEAS - LV V1 MEAN: 66 CM/SEC
BH CV ECHO MEAS - LV V1 VTI: 20.7 CM
BH CV ECHO MEAS - LVIDD: 7 CM
BH CV ECHO MEAS - LVIDS: 5.4 CM
BH CV ECHO MEAS - LVLD AP4: 10.5 CM
BH CV ECHO MEAS - LVLS AP4: 8.8 CM
BH CV ECHO MEAS - LVOT AREA (M): 3.5 CM^2
BH CV ECHO MEAS - LVOT AREA: 3.5 CM^2
BH CV ECHO MEAS - LVOT DIAM: 2.1 CM
BH CV ECHO MEAS - LVPWD: 1.4 CM
BH CV ECHO MEAS - MED PEAK E' VEL: 6 CM/SEC
BH CV ECHO MEAS - MV A MAX VEL: 70.8 CM/SEC
BH CV ECHO MEAS - MV DEC SLOPE: 518 CM/SEC^2
BH CV ECHO MEAS - MV DEC TIME: 0.25 SEC
BH CV ECHO MEAS - MV E MAX VEL: 49.5 CM/SEC
BH CV ECHO MEAS - MV E/A: 0.7
BH CV ECHO MEAS - MV P1/2T MAX VEL: 106 CM/SEC
BH CV ECHO MEAS - MV P1/2T: 59.9 MSEC
BH CV ECHO MEAS - MVA P1/2T LCG: 2.1 CM^2
BH CV ECHO MEAS - MVA(P1/2T): 3.7 CM^2
BH CV ECHO MEAS - PA MAX PG: 4 MMHG
BH CV ECHO MEAS - PA V2 MAX: 100 CM/SEC
BH CV ECHO MEAS - SI(AO): 141 ML/M^2
BH CV ECHO MEAS - SI(CUBED): 86.2 ML/M^2
BH CV ECHO MEAS - SI(LVOT): 34.8 ML/M^2
BH CV ECHO MEAS - SI(MOD-SP4): 58 ML/M^2
BH CV ECHO MEAS - SI(TEICH): 53 ML/M^2
BH CV ECHO MEAS - SV(AO): 290.5 ML
BH CV ECHO MEAS - SV(CUBED): 177.6 ML
BH CV ECHO MEAS - SV(LVOT): 71.7 ML
BH CV ECHO MEAS - SV(MOD-SP4): 119.6 ML
BH CV ECHO MEAS - SV(TEICH): 109.2 ML
BH CV ECHO MEASUREMENTS AVERAGE E/E' RATIO: 9.61
BILIRUB SERPL-MCNC: 0.4 MG/DL (ref 0–1.2)
BODY TEMPERATURE: 37 C
BODY TEMPERATURE: 37 C
BUN SERPL-MCNC: 29 MG/DL (ref 8–23)
BUN/CREAT SERPL: 34.5 (ref 7–25)
CALCIUM SPEC-SCNC: 8.8 MG/DL (ref 8.6–10.5)
CHLORIDE SERPL-SCNC: 97 MMOL/L (ref 98–107)
CO2 SERPL-SCNC: 28 MMOL/L (ref 22–29)
CREAT SERPL-MCNC: 0.84 MG/DL (ref 0.76–1.27)
D DIMER PPP FEU-MCNC: 0.81 MG/L (FEU) (ref 0–0.5)
DEPRECATED RDW RBC AUTO: 47.3 FL (ref 37–54)
EOSINOPHIL # BLD AUTO: 0 10*3/MM3 (ref 0–0.4)
EOSINOPHIL NFR BLD AUTO: 0 % (ref 0.3–6.2)
EPAP: 8
ERYTHROCYTE [DISTWIDTH] IN BLOOD BY AUTOMATED COUNT: 14.2 % (ref 12.3–15.4)
FIBRINOGEN PPP-MCNC: 748 MG/DL (ref 240–460)
GAS FLOW AIRWAY: 30 LPM
GFR SERPL CREATININE-BSD FRML MDRD: 90 ML/MIN/1.73
GLOBULIN UR ELPH-MCNC: 3.6 GM/DL
GLUCOSE SERPL-MCNC: 115 MG/DL (ref 65–99)
HCO3 BLDA-SCNC: 30.2 MMOL/L (ref 20–26)
HCO3 BLDA-SCNC: 30.8 MMOL/L (ref 20–26)
HCT VFR BLD AUTO: 47.9 % (ref 37.5–51)
HGB BLD-MCNC: 16.2 G/DL (ref 13–17.7)
IMM GRANULOCYTES # BLD AUTO: 0.06 10*3/MM3 (ref 0–0.05)
IMM GRANULOCYTES NFR BLD AUTO: 0.6 % (ref 0–0.5)
INHALED O2 CONCENTRATION: 100 %
INHALED O2 CONCENTRATION: 100 %
IPAP: 16
LDH SERPL-CCNC: 517 U/L (ref 135–225)
LEFT ATRIUM VOLUME INDEX: 30 ML/M2
LYMPHOCYTES # BLD AUTO: 1.03 10*3/MM3 (ref 0.7–3.1)
LYMPHOCYTES NFR BLD AUTO: 9.5 % (ref 19.6–45.3)
Lab: ABNORMAL
Lab: ABNORMAL
MAXIMAL PREDICTED HEART RATE: 148 BPM
MCH RBC QN AUTO: 30.5 PG (ref 26.6–33)
MCHC RBC AUTO-ENTMCNC: 33.8 G/DL (ref 31.5–35.7)
MCV RBC AUTO: 90.2 FL (ref 79–97)
MODALITY: ABNORMAL
MODALITY: ABNORMAL
MONOCYTES # BLD AUTO: 0.3 10*3/MM3 (ref 0.1–0.9)
MONOCYTES NFR BLD AUTO: 2.8 % (ref 5–12)
NEUTROPHILS NFR BLD AUTO: 86.9 % (ref 42.7–76)
NEUTROPHILS NFR BLD AUTO: 9.42 10*3/MM3 (ref 1.7–7)
NRBC BLD AUTO-RTO: 0 /100 WBC (ref 0–0.2)
PCO2 BLDA: 38 MM HG (ref 35–45)
PCO2 BLDA: 43.5 MM HG (ref 35–45)
PCO2 TEMP ADJ BLD: 38 MM HG (ref 35–45)
PCO2 TEMP ADJ BLD: 43.5 MM HG (ref 35–45)
PH BLDA: 7.46 PH UNITS (ref 7.35–7.45)
PH BLDA: 7.51 PH UNITS (ref 7.35–7.45)
PH, TEMP CORRECTED: 7.46 PH UNITS (ref 7.35–7.45)
PH, TEMP CORRECTED: 7.51 PH UNITS (ref 7.35–7.45)
PLATELET # BLD AUTO: 233 10*3/MM3 (ref 140–450)
PMV BLD AUTO: 10.3 FL (ref 6–12)
PO2 BLDA: 58.5 MM HG (ref 83–108)
PO2 BLDA: 84 MM HG (ref 83–108)
PO2 TEMP ADJ BLD: 58.5 MM HG (ref 83–108)
PO2 TEMP ADJ BLD: 84 MM HG (ref 83–108)
POTASSIUM SERPL-SCNC: 4.1 MMOL/L (ref 3.5–5.2)
PROT SERPL-MCNC: 6.9 G/DL (ref 6–8.5)
RBC # BLD AUTO: 5.31 10*6/MM3 (ref 4.14–5.8)
SAO2 % BLDCOA: 92.1 % (ref 94–99)
SAO2 % BLDCOA: 96.6 % (ref 94–99)
SET MECH RESP RATE: 20
SODIUM SERPL-SCNC: 136 MMOL/L (ref 136–145)
STRESS TARGET HR: 126 BPM
UNIT  ABO: NORMAL
UNIT  RH: NORMAL
VENTILATOR MODE: ABNORMAL
VENTILATOR MODE: ABNORMAL
WBC # BLD AUTO: 10.83 10*3/MM3 (ref 3.4–10.8)

## 2021-02-03 PROCEDURE — 85379 FIBRIN DEGRADATION QUANT: CPT | Performed by: INTERNAL MEDICINE

## 2021-02-03 PROCEDURE — 25010000002 DEXAMETHASONE PER 1 MG: Performed by: INTERNAL MEDICINE

## 2021-02-03 PROCEDURE — 94660 CPAP INITIATION&MGMT: CPT

## 2021-02-03 PROCEDURE — 3E0333Z INTRODUCTION OF ANTI-INFLAMMATORY INTO PERIPHERAL VEIN, PERCUTANEOUS APPROACH: ICD-10-PCS | Performed by: INTERNAL MEDICINE

## 2021-02-03 PROCEDURE — 36600 WITHDRAWAL OF ARTERIAL BLOOD: CPT

## 2021-02-03 PROCEDURE — 25010000002 ENOXAPARIN PER 10 MG: Performed by: INTERNAL MEDICINE

## 2021-02-03 PROCEDURE — 83615 LACTATE (LD) (LDH) ENZYME: CPT | Performed by: INTERNAL MEDICINE

## 2021-02-03 PROCEDURE — 85025 COMPLETE CBC W/AUTO DIFF WBC: CPT | Performed by: INTERNAL MEDICINE

## 2021-02-03 PROCEDURE — 99222 1ST HOSP IP/OBS MODERATE 55: CPT | Performed by: INTERNAL MEDICINE

## 2021-02-03 PROCEDURE — 80053 COMPREHEN METABOLIC PANEL: CPT | Performed by: INTERNAL MEDICINE

## 2021-02-03 PROCEDURE — 25010000002 FUROSEMIDE PER 20 MG: Performed by: FAMILY MEDICINE

## 2021-02-03 PROCEDURE — 94799 UNLISTED PULMONARY SVC/PX: CPT

## 2021-02-03 PROCEDURE — 82803 BLOOD GASES ANY COMBINATION: CPT

## 2021-02-03 PROCEDURE — XW033E5 INTRODUCTION OF REMDESIVIR ANTI-INFECTIVE INTO PERIPHERAL VEIN, PERCUTANEOUS APPROACH, NEW TECHNOLOGY GROUP 5: ICD-10-PCS | Performed by: INTERNAL MEDICINE

## 2021-02-03 PROCEDURE — 85384 FIBRINOGEN ACTIVITY: CPT | Performed by: INTERNAL MEDICINE

## 2021-02-03 RX ORDER — ASCORBIC ACID 500 MG
500 TABLET ORAL 2 TIMES DAILY
Status: DISCONTINUED | OUTPATIENT
Start: 2021-02-03 | End: 2021-02-16 | Stop reason: HOSPADM

## 2021-02-03 RX ORDER — ALPRAZOLAM 0.25 MG/1
0.25 TABLET ORAL 3 TIMES DAILY
Status: COMPLETED | OUTPATIENT
Start: 2021-02-03 | End: 2021-02-09

## 2021-02-03 RX ADMIN — SODIUM CHLORIDE, PRESERVATIVE FREE 10 ML: 5 INJECTION INTRAVENOUS at 09:10

## 2021-02-03 RX ADMIN — Medication 1000 UNITS: at 09:07

## 2021-02-03 RX ADMIN — ALBUTEROL SULFATE 2 PUFF: 90 AEROSOL, METERED RESPIRATORY (INHALATION) at 06:56

## 2021-02-03 RX ADMIN — VERAPAMIL HYDROCHLORIDE 180 MG: 180 TABLET, FILM COATED, EXTENDED RELEASE ORAL at 09:07

## 2021-02-03 RX ADMIN — ALBUTEROL SULFATE 2 PUFF: 90 AEROSOL, METERED RESPIRATORY (INHALATION) at 15:56

## 2021-02-03 RX ADMIN — REMDESIVIR 100 MG: 100 INJECTION, POWDER, LYOPHILIZED, FOR SOLUTION INTRAVENOUS at 04:20

## 2021-02-03 RX ADMIN — ENOXAPARIN SODIUM 40 MG: 40 INJECTION SUBCUTANEOUS at 09:09

## 2021-02-03 RX ADMIN — ALPRAZOLAM 0.25 MG: 0.25 TABLET ORAL at 20:47

## 2021-02-03 RX ADMIN — FUROSEMIDE 20 MG: 10 INJECTION, SOLUTION INTRAMUSCULAR; INTRAVENOUS at 02:38

## 2021-02-03 RX ADMIN — ATORVASTATIN CALCIUM 10 MG: 10 TABLET, FILM COATED ORAL at 20:47

## 2021-02-03 RX ADMIN — OXYCODONE HYDROCHLORIDE AND ACETAMINOPHEN 500 MG: 500 TABLET ORAL at 09:07

## 2021-02-03 RX ADMIN — OXYCODONE HYDROCHLORIDE AND ACETAMINOPHEN 500 MG: 500 TABLET ORAL at 20:47

## 2021-02-03 RX ADMIN — SODIUM CHLORIDE, PRESERVATIVE FREE 10 ML: 5 INJECTION INTRAVENOUS at 20:48

## 2021-02-03 RX ADMIN — FAMOTIDINE 20 MG: 20 TABLET, FILM COATED ORAL at 17:56

## 2021-02-03 RX ADMIN — ALBUTEROL SULFATE 2 PUFF: 90 AEROSOL, METERED RESPIRATORY (INHALATION) at 09:57

## 2021-02-03 RX ADMIN — ALPRAZOLAM 0.25 MG: 0.25 TABLET ORAL at 17:56

## 2021-02-03 RX ADMIN — Medication 3 MG: at 20:47

## 2021-02-03 RX ADMIN — Medication 15 ML: at 09:09

## 2021-02-03 RX ADMIN — HYDROCHLOROTHIAZIDE 25 MG: 25 TABLET ORAL at 09:07

## 2021-02-03 RX ADMIN — THIAMINE HCL TAB 100 MG 100 MG: 100 TAB at 09:07

## 2021-02-03 RX ADMIN — ALPRAZOLAM 0.25 MG: 0.25 TABLET ORAL at 11:20

## 2021-02-03 RX ADMIN — DEXAMETHASONE SODIUM PHOSPHATE 6 MG: 4 INJECTION, SOLUTION INTRAMUSCULAR; INTRAVENOUS at 09:07

## 2021-02-03 RX ADMIN — ALBUTEROL SULFATE 2 PUFF: 90 AEROSOL, METERED RESPIRATORY (INHALATION) at 20:20

## 2021-02-03 RX ADMIN — DOCUSATE SODIUM 100 MG: 100 CAPSULE ORAL at 09:07

## 2021-02-03 RX ADMIN — FAMOTIDINE 20 MG: 20 TABLET, FILM COATED ORAL at 09:07

## 2021-02-03 RX ADMIN — ZINC SULFATE 220 MG (50 MG) CAPSULE 220 MG: CAPSULE at 09:07

## 2021-02-03 RX ADMIN — ASPIRIN 81 MG: 81 TABLET, CHEWABLE ORAL at 09:07

## 2021-02-03 NOTE — CONSULTS
INFECTIOUS DISEASES CONSULT NOTE    Patient:  Santiago Spence 70 y.o. male  ROOM # I005/1  YOB: 1950  MRN: [id number]  [facility]:  [id number]  Admit date: 2/2/2021   Admitting Physician: Aime Rivera MD  Primary Care Physician: Meredith Lloyd APRN  REFERRING PROVIDER: Zac Orellana MD    Reason for Consultation: Respiratory failure.  Hypoxia.    History of Present Illness/Chief Complaint: Pleasant 70-year-old man.  He indicates symptoms started 10 days ago on Saturday.  He had had a tickle in his throat.  He had some low-grade fever.  He had received outpatient oral antibiotic treatment and also oral Decadron.  He was taking zinc and vitamin C as well.  He presented to [facility] in [facility].  He had an oxygen saturation of 81% on room air per review of his admit H&P.  His Covid test was positive.  He indicates this was the first time he had tested positive for Covid.  He has not had diarrhea.  He did not have any headache or other neurological symptoms.  He was transferred to [facility] for further management.  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  He was wearing a mask while he had been doing his work.  He he indicated it would generate some black-colored aluminum powder that would collect in the mask.    Current Scheduled Medications:   albuterol sulfate HFA, 2 puff, Inhalation, 4x Daily - RT  ALPRAZolam, 0.25 mg, Oral, TID  ascorbic acid, 500 mg, Oral, BID  aspirin, 81 mg, Oral, Daily  atorvastatin, 10 mg, Oral, Nightly  cholecalciferol, 1,000 Units, Oral, Daily  dexamethasone, 6 mg, Oral, Daily With Breakfast    Or  dexamethasone, 6 mg, Intravenous, Daily With Breakfast  docusate sodium, 100 mg, Oral, Daily  enoxaparin, 40 mg, Subcutaneous, Q24H  famotidine, 20 mg, Oral, BID AC  hydroCHLOROthiazide, 25 mg, Oral, Daily  melatonin, 3 mg, Oral, Nightly  multivitamin and minerals, 15 mL, Oral,  "Daily  polyethylene glycol, 17 g, Oral, Daily  remdesivir, 100 mg, Intravenous, Q24H  sodium chloride, 10 mL, Intravenous, Q12H  verapamil SR, 180 mg, Oral, Q24H  zinc sulfate, 220 mg, Oral, Daily      Current PRN Medications:  •  acetaminophen **OR** acetaminophen  •  benzonatate  •  dextromethorphan polistirex ER  •  labetalol  •  ondansetron **OR** ondansetron  •  Pharmacy Consult - Remdesivir  •  sodium chloride    Allergies:  No Known Allergies     Past Medical History: Degenerative arthritis.  Colon cancer.  Hypertension.  Skin cancer.    Past Surgical History: Colon surgery.  Joint replacement.  Skin cancer removal.    Social History: No tobacco use.    Family History: Coronary artery disease.    Exposure History: He indicates no close contacts have been ill    Review of Systems  No weight gain or weight loss  No adenopathy  No rash or skin itching  See HPI    Vital Signs:  /68   Pulse 76   Temp 98.3 °F (36.8 °C) (Oral)   Resp 27   Ht 162.6 cm (64\")   Wt 102 kg (224 lb)   SpO2 95%   BMI 38.45 kg/m²  Temp (24hrs), Av.9 °F (36.6 °C), Min:97.3 °F (36.3 °C), Max:98.7 °F (37.1 °C)    Physical Exam  Vital signs - reviewed.  Line/IV site - No erythema or tenderness.  No cervical axillary or inguinal adenopathy  Lungs clear to auscultation without crackles  Heart without murmur  Abdomen is soft and nontender without hepatosplenomegaly  Extremities without edema  General he looks comfortable.  He is on BiPAP.  He does not appear to be in distress.  He is up to chair.    Lab Results:  CBC:   Results from last 7 days   Lab Units 21  0430 21  0424   WBC 10*3/mm3 10.83* 8.07   HEMOGLOBIN g/dL 16.2 16.1   HEMATOCRIT % 47.9 48.8   PLATELETS 10*3/mm3 233 199     CMP:   Results from last 7 days   Lab Units 21  0430 21  0424 21  1757   SODIUM mmol/L 136 137 137   POTASSIUM mmol/L 4.1 4.0 3.5   CHLORIDE mmol/L 97* 97* 98   TOTAL CO2 mmol/L  --   --  30   CO2 mmol/L 28.0 31.0*  --  "   BUN mg/dL 29* 20 19*   CREATININE mg/dL 0.84 0.79 0.85   CALCIUM mg/dL 8.8 8.9 8.6   BILIRUBIN mg/dL 0.4 0.3 0.4   ALK PHOS U/L 57 58 56   ALT (SGPT) U/L 20 23 31   AST (SGOT) U/L 38 31 40*   GLUCOSE mg/dL 115* 121*  --      Radiology:   Chest x-ray February 2, 2021:  IMPRESSION:  Impression:  Bilateral mid to lower lung zone opacities in the background of  cardiomegaly. Consider fluid overload. Superimposed infection is not  excluded.  This report was finalized on 02/02/2021 13:24 by Dr. Ekta Landeros MD.      Additional Studies Reviewed:   2D-echo yesterday:  · The left ventricular cavity is moderately dilated.  · Left ventricular wall thickness is consistent with mild to moderate concentric hypertrophy.  · Mild aortic valve stenosis is present.  · Left ventricular diastolic function is consistent with (grade I) impaired relaxation.  · Left ventricular ejection fraction appears to be 56 - 60%. Left ventricular systolic function is normal.    Impression:   1.  COVID-19 infection  2.  Respiratory failure secondary to COVID-19  3.  Diastolic dysfunction based on echocardiogram  4.  Hypertension    Recommendations:    Does not seem to have fever or productive cough to warrant antibiotic treatment  Continue 10-day course of dexamethasone  Continue 5-day course of remdesivir  Continue adjunctive treatments with vitamin C, vitamin D, zinc  Continue to follow      Ronni Mattson MD  02/03/21  14:47 CST

## 2021-02-03 NOTE — CONSULTS
PULMONARY AND CRITICAL CARE CONSULT - [facility]    Santiago Spence   MR# [id number]  Acct# [id number]  2/3/2021   12:03 CST    Referring Provider: Aime Rivera MD    Chief Complaint: Acute respiratory failure due to SARS-CoV-2 infection    HPI: We are consulted by Aime Rivera MD to see this 70 y.o. male born on 1950.  Patient has presented with continuous worsening and severe shortness of breath in chest for 14 days in context Covid-19, with respiratory deterioration requiring escalation of oxygen therapy. He was seen in the intensive care unit from the glass door with help of HALEY Menjivar. He was seen from the distance with optimal precautions to reduce the risk of exposure and cross infection and to minimize the use of PPE.    Cannot actually presented with history of low-grade fevers cough congestion watery eyes shortness of breath for last almost 2 weeks. He was tested for Covid outside the hospital but the tests also are pending. However he was seen by the primary care provider as outpatient and apparently he was treated by his primary care doctor with a believe Z-Rashid and doxycycline both as well as started on Decadron p.o.  He is also been taking zinc and vitamin C.    His symptoms did not improve and he decided to come to the hospital.He presented to [facility] yesterday to worsening shortness of breath.   On arrival his oxygen sat was 81% on room air.  He had chest x-rays and CT scans of the chest that showed bilateral interstitial infiltrates consistent with Covid.  D-dimers were elevated but no PEs were noted.  His Covid test is positive.   He was seen by his primary care provider earlier yesterday but it is also still not back that time. After transfer from [place] he was admitted in [facility] under hospitalist team and pulmonary consult requested this morning.     He did not have any nausea vomiting or diarrhea he does state he has sense of taste  and smell of not changed either.  He has been coughing up brown sputum.  Rest of his laboratory showed a pH of 7.48 PCO2 39 PO2 of 41 bicarb 29 sat 81% on room air.  LDH was elevated at 454 ferritin was elevated 428.  White counts 9.2 hemoglobin 16.1 hematocrit 46 platelets 188,000 with 92 segs 4.2% lymphocytes 2.4% monocytes.  D-dimer is 1.06 (CTA negative for PE) his troponin was negative his AST was 40.  His albumin was low at 2.7 his glucose was 122 BUN slightly elevated at 19 with a creatinine of 0.85 respectively.     I have seen the patient in intensive care unit today and he appears to be very anxious and has oxygen desaturation the mid to low 80s while on Vapotherm with 30 L and 100% FiO2. He was not exactly sure he wanted to do BiPAP or wanted to be intubated only to talk to his wife. His blood gas done morning shows significant oxygen desaturation with PO2 of 58. He reported he is a non-smoker. He is currently retired he was diagnosed with a sleep apnea years ago but he never had a CPAP. He had history of colon cancer which is treated and is apparently cancer free. He is not a diabetic. He does get his influenza vaccine. He did not have any prior lung problems and was not on any inhalers or oxygen at home.    Already started on remdesivir with dosing per pharmacy and also getting dexamethasone. He is also getting melatonin and vitamins with zinc. He had 1 unit of convalescent plasma.    Past Medical History   has a past medical history of Arthritis, Cancer (CMS/HCC), Colon cancer (CMS/HCC), Hypertension, and Skin cancer.   has a past surgical history that includes Colon surgery; Joint replacement; and Skin cancer excision.  No Known Allergies  Medications  albuterol sulfate HFA, 2 puff, Inhalation, 4x Daily - RT  ALPRAZolam, 0.25 mg, Oral, TID  ascorbic acid, 500 mg, Oral, BID  aspirin, 81 mg, Oral, Daily  atorvastatin, 10 mg, Oral, Nightly  cholecalciferol, 1,000 Units, Oral, Daily  dexamethasone, 6 mg,  Oral, Daily With Breakfast    Or  dexamethasone, 6 mg, Intravenous, Daily With Breakfast  docusate sodium, 100 mg, Oral, Daily  enoxaparin, 40 mg, Subcutaneous, Q24H  famotidine, 20 mg, Oral, BID AC  hydroCHLOROthiazide, 25 mg, Oral, Daily  melatonin, 3 mg, Oral, Nightly  multivitamin and minerals, 15 mL, Oral, Daily  polyethylene glycol, 17 g, Oral, Daily  remdesivir, 100 mg, Intravenous, Q24H  sodium chloride, 10 mL, Intravenous, Q12H  verapamil SR, 180 mg, Oral, Q24H  zinc sulfate, 220 mg, Oral, Daily      Pharmacy Consult - Remdesivir,       Social History   reports that he has never smoked. He quit smokeless tobacco use about 4 years ago.  Family History  family history includes Coronary artery disease in his father; Hypertension in his mother.  Review of Systems:  Review of Systems   Constitutional: Positive for fatigue and fever.   HENT: Positive for congestion and postnasal drip.    Eyes: Negative.    Respiratory: Positive for cough, chest tightness and wheezing.    Cardiovascular: Negative for chest pain, palpitations and leg swelling.   Gastrointestinal: Negative.  Negative for diarrhea, nausea and vomiting.   Endocrine: Negative.    Genitourinary: Negative.    Musculoskeletal: Positive for arthralgias and back pain.   Skin: Negative.    Allergic/Immunologic: Positive for environmental allergies.   Neurological: Negative.    Hematological: Negative.    Psychiatric/Behavioral: The patient is nervous/anxious.       Physical Exam:  Temp:  [97.3 °F (36.3 °C)-98.7 °F (37.1 °C)] 98 °F (36.7 °C)  Heart Rate:  [64-94] 94  Resp:  [22-33] 29  BP: (121-159)/() 159/84    Intake/Output Summary (Last 24 hours) at 2/3/2021 1203  Last data filed at 2/3/2021 0918  Gross per 24 hour   Intake 451 ml   Output 1325 ml   Net -874 ml         02/02/21  0039 02/03/21  0500   Weight: 103 kg (226 lb 10.1 oz) 102 kg (224 lb)     SpO2 Percentage    02/03/21 0957 02/03/21 1000 02/03/21 1100   SpO2: 92% 91% (!) 89%      GENERAL/CONSTITUTIONAL: Elderly obese  gentleman sitting up in the chair in mild acute distress.  HEENT: atraumatic, normocephalic  NOSE: normal  NECK: jugular veins nondistended  CHEST: no paradox, no retractions.  No respiratory distress. Side muscle respirations working times.  CARDIAC: Sinus rhythm  ABDOMEN: nondistended  : No Sheppard's catheter noted.  EXTREMITIES: No visible ankle edema.  NEURO:  awake. No seizure activity. No obvious cranial nerve abnormality  SKIN: no jaundice.  No rash      Results from last 7 days   Lab Units 02/03/21  0430 02/02/21  0424   WBC 10*3/mm3 10.83* 8.07   HEMOGLOBIN g/dL 16.2 16.1   PLATELETS 10*3/mm3 233 199     Results from last 7 days   Lab Units 02/03/21  0430 02/02/21  0424 02/01/21  1757   SODIUM mmol/L 136 137 137   POTASSIUM mmol/L 4.1 4.0 3.5   TOTAL CO2 mmol/L  --   --  30   CO2 mmol/L 28.0 31.0*  --    BUN mg/dL 29* 20 19*   CREATININE mg/dL 0.84 0.79 0.85   MAGNESIUM mg/dL  --  2.3  --    GLUCOSE mg/dL 115* 121*  --    CRP mg/dL  --  29.57*  --    PROCALCITONIN ng/mL  --  0.24  --    FERRITIN ng/mL  --  525.00* 428*   D DIMER QUANT mg/L (FEU) 0.81* 0.79*  --      Results from last 7 days   Lab Units 02/03/21  1000 02/02/21  0416   PH, ARTERIAL pH units 7.509* 7.471*   PCO2, ARTERIAL mm Hg 38.0 42.2   PO2 ART mm Hg 58.5* 84.5   FIO2 % 100  --      Blood Culture   Date Value Ref Range Status   02/02/2021 No growth at 24 hours  Preliminary   02/02/2021 No growth at 24 hours  Preliminary     Respiratory Culture   Date Value Ref Range Status   02/02/2021   Preliminary    Heavy growth (4+) Normal Respiratory Jazz: NO S.aureus/MRSA or Pseudomonas aeruginosa     Lab Results   Component Value Date    PROBNP 298.8 02/02/2021         Recent radiology:   Imaging Results (Last 72 Hours)     Procedure Component Value Units Date/Time    XR Chest 1 View [[id number]] Collected: 02/02/21 1323     Updated: 02/02/21 1327    Narrative:      Exam:   XR CHEST 1 VW-        Date:  2021      History:  Male, age  70 years;sob     COMPARISON:  None.     Findings :     Mild to moderate cardiomegaly.      Bilateral mid to lower lung zone parenchymal opacities. No measurable  pneumothorax. No pleural effusion..  The bones show no acute pathology.           Impression:      Impression:     Bilateral mid to lower lung zone opacities in the background of  cardiomegaly. Consider fluid overload. Superimposed infection is not  excluded.     This report was finalized on 2021 13:24 by Dr. Ekta Landeros MD.        My radiograph interpretation/independent review of imaging: Reviewed and agreed with current plan.  Other test results (not lab or imaging):   Reviewed  Independent review of ekg: Done    Problem List as identified by Epic (may contain historical, inactive problems)  Patient Active Problem List   Diagnosis   • Essential hypertension   • Hypokalemia   • Colon cancer (CMS/HCC)   • Pneumonia due to COVID-19 virus   • Acute respiratory failure with hypoxia (CMS/HCC)     Pulmonary Assessment:  Acute respiratory failure due to Covid-19.  Pt has failed pulmonary system.  This is a threat to life or pulmonary function, high risk, due to Covid-19 infection and failure of pulmonary system    New problem (to me), with additional workup planned: Acute respiratory failure with hypoxia  New problem (to me), no additional workup planned: Covid-19 with adjuvant therapies per others  Other problems either stable, failing to improve or worsenin. Acute hypoxic respiratory failure  2. COVID-19 pneumonia  3. Bilateral pulmonary infiltrate  4. Acute respiratory distress syndrome with increasing oxygen requirement  5. History of colon cancer  6. Anxiety and depression  7. Hypertension  8. Obesity  9. Obstructive sleep apnea  10. Patient was not on CPAP.    Recommend/plan:   Continue oxygen supplementation for saturation goal of 90%.  Chest X-ray as indicated, with judicious use to minimize  exposure risk to hospital personnel  Arterial blood gas analysis as indicated  Aggressive DVT prophylaxis; Lovenox  Proning candidate: Yes if he is able to but if he gets intubated he will definitely need prone ventilation.  Dexamethasone day 2 of 10 this inpatient stay.  Peptic ulcer prophylaxis: Pepcid.  Additional plan:  · Bronchodilator treatment with albuterol HFA  · Encourage incentive spirometry improve pulmonary compliance  · Aerobika flutter valve to improve pulmonary clearance.  · Continue remdesivir and dosing per pharmacy.  · Continue dexamethasone for 10 days  · Continue zinc melatonin and vitamins as adjunct treatment.  · Blood pressure and glycemic control.  · Continue pain and anxiety control. He is extremely anxious and started on Xanax  · Supportive respiratory care with Vapotherm and he was started on BiPAP.  · He is doing little better on the BiPAP but if he fails to improve we will consider intubation mechanical ventilation and prone ventilation.  · Patient is not sure he wanted to be intubated but still remains a full code. He wanted to talk to his wife first to decide about intubation.  · Continue current treatment plan supportive care  · Wait for ID recommendations  · DVT and ulcer prophylaxis addressed.  · We appreciate the consult and like to thank the hospitalist team for the referral  · CODE STATUS: Full. Pulmonary team will continue following him and make further recommendations.  · Total time spent in seeing this patient is a pulmonary inpatient consult was 45 minutes.    Thank you for this consult.  We will follow along.    Electronically signed by     Cora Ewing MD   Pulmonologist/intensivist   on 2/3/2021 at 12:03 CST

## 2021-02-04 LAB
ALBUMIN SERPL-MCNC: 3 G/DL (ref 3.5–5.2)
ALBUMIN/GLOB SERPL: 0.9 G/DL
ALP SERPL-CCNC: 58 U/L (ref 39–117)
ALT SERPL W P-5'-P-CCNC: 18 U/L (ref 1–41)
ANION GAP SERPL CALCULATED.3IONS-SCNC: 9 MMOL/L (ref 5–15)
ARTERIAL PATENCY WRIST A: ABNORMAL
AST SERPL-CCNC: 34 U/L (ref 1–40)
ATMOSPHERIC PRESS: 747 MMHG
BACTERIA SPEC RESP CULT: NORMAL
BASE EXCESS BLDA CALC-SCNC: 5.6 MMOL/L (ref 0–2)
BASOPHILS # BLD AUTO: 0.02 10*3/MM3 (ref 0–0.2)
BASOPHILS NFR BLD AUTO: 0.2 % (ref 0–1.5)
BDY SITE: ABNORMAL
BILIRUB SERPL-MCNC: 0.3 MG/DL (ref 0–1.2)
BODY TEMPERATURE: 37 C
BUN SERPL-MCNC: 32 MG/DL (ref 8–23)
BUN/CREAT SERPL: 47.1 (ref 7–25)
CALCIUM SPEC-SCNC: 8.6 MG/DL (ref 8.6–10.5)
CHLORIDE SERPL-SCNC: 100 MMOL/L (ref 98–107)
CO2 SERPL-SCNC: 28 MMOL/L (ref 22–29)
CREAT SERPL-MCNC: 0.68 MG/DL (ref 0.76–1.27)
CRP SERPL-MCNC: 9.79 MG/DL (ref 0–0.5)
DEPRECATED RDW RBC AUTO: 46.6 FL (ref 37–54)
EOSINOPHIL # BLD AUTO: 0 10*3/MM3 (ref 0–0.4)
EOSINOPHIL NFR BLD AUTO: 0 % (ref 0.3–6.2)
EPAP: 8
ERYTHROCYTE [DISTWIDTH] IN BLOOD BY AUTOMATED COUNT: 14.1 % (ref 12.3–15.4)
FERRITIN SERPL-MCNC: 648.8 NG/ML (ref 30–400)
GFR SERPL CREATININE-BSD FRML MDRD: 115 ML/MIN/1.73
GLOBULIN UR ELPH-MCNC: 3.4 GM/DL
GLUCOSE SERPL-MCNC: 113 MG/DL (ref 65–99)
GRAM STN SPEC: NORMAL
HCO3 BLDA-SCNC: 30.7 MMOL/L (ref 20–26)
HCT VFR BLD AUTO: 43.5 % (ref 37.5–51)
HGB BLD-MCNC: 15.1 G/DL (ref 13–17.7)
IMM GRANULOCYTES # BLD AUTO: 0.07 10*3/MM3 (ref 0–0.05)
IMM GRANULOCYTES NFR BLD AUTO: 0.7 % (ref 0–0.5)
INHALED O2 CONCENTRATION: 100 %
IPAP: 16
LYMPHOCYTES # BLD AUTO: 0.87 10*3/MM3 (ref 0.7–3.1)
LYMPHOCYTES NFR BLD AUTO: 8.8 % (ref 19.6–45.3)
Lab: ABNORMAL
MCH RBC QN AUTO: 31.3 PG (ref 26.6–33)
MCHC RBC AUTO-ENTMCNC: 34.7 G/DL (ref 31.5–35.7)
MCV RBC AUTO: 90.1 FL (ref 79–97)
MODALITY: ABNORMAL
MONOCYTES # BLD AUTO: 0.38 10*3/MM3 (ref 0.1–0.9)
MONOCYTES NFR BLD AUTO: 3.8 % (ref 5–12)
NEUTROPHILS NFR BLD AUTO: 8.57 10*3/MM3 (ref 1.7–7)
NEUTROPHILS NFR BLD AUTO: 86.5 % (ref 42.7–76)
NRBC BLD AUTO-RTO: 0 /100 WBC (ref 0–0.2)
PCO2 BLDA: 44.8 MM HG (ref 35–45)
PCO2 TEMP ADJ BLD: 44.8 MM HG (ref 35–45)
PH BLDA: 7.44 PH UNITS (ref 7.35–7.45)
PH, TEMP CORRECTED: 7.44 PH UNITS (ref 7.35–7.45)
PLATELET # BLD AUTO: 230 10*3/MM3 (ref 140–450)
PMV BLD AUTO: 10.1 FL (ref 6–12)
PO2 BLDA: 67 MM HG (ref 83–108)
PO2 TEMP ADJ BLD: 67 MM HG (ref 83–108)
POTASSIUM SERPL-SCNC: 3.8 MMOL/L (ref 3.5–5.2)
PROT SERPL-MCNC: 6.4 G/DL (ref 6–8.5)
RBC # BLD AUTO: 4.83 10*6/MM3 (ref 4.14–5.8)
SAO2 % BLDCOA: 93.5 % (ref 94–99)
SODIUM SERPL-SCNC: 137 MMOL/L (ref 136–145)
VENTILATOR MODE: ABNORMAL
WBC # BLD AUTO: 9.91 10*3/MM3 (ref 3.4–10.8)

## 2021-02-04 PROCEDURE — 86140 C-REACTIVE PROTEIN: CPT | Performed by: FAMILY MEDICINE

## 2021-02-04 PROCEDURE — 99233 SBSQ HOSP IP/OBS HIGH 50: CPT | Performed by: INTERNAL MEDICINE

## 2021-02-04 PROCEDURE — 94799 UNLISTED PULMONARY SVC/PX: CPT

## 2021-02-04 PROCEDURE — 25010000002 ENOXAPARIN PER 10 MG: Performed by: INTERNAL MEDICINE

## 2021-02-04 PROCEDURE — 80053 COMPREHEN METABOLIC PANEL: CPT | Performed by: INTERNAL MEDICINE

## 2021-02-04 PROCEDURE — 63710000001 DEXAMETHASONE PER 0.25 MG: Performed by: INTERNAL MEDICINE

## 2021-02-04 PROCEDURE — 85025 COMPLETE CBC W/AUTO DIFF WBC: CPT | Performed by: INTERNAL MEDICINE

## 2021-02-04 PROCEDURE — 25010000002 FUROSEMIDE PER 20 MG: Performed by: FAMILY MEDICINE

## 2021-02-04 PROCEDURE — 82728 ASSAY OF FERRITIN: CPT | Performed by: FAMILY MEDICINE

## 2021-02-04 PROCEDURE — 94660 CPAP INITIATION&MGMT: CPT

## 2021-02-04 PROCEDURE — 82803 BLOOD GASES ANY COMBINATION: CPT

## 2021-02-04 PROCEDURE — 36600 WITHDRAWAL OF ARTERIAL BLOOD: CPT

## 2021-02-04 PROCEDURE — 25010000002 DEXAMETHASONE PER 1 MG: Performed by: INTERNAL MEDICINE

## 2021-02-04 RX ORDER — DEXAMETHASONE SODIUM PHOSPHATE 4 MG/ML
6 INJECTION, SOLUTION INTRA-ARTICULAR; INTRALESIONAL; INTRAMUSCULAR; INTRAVENOUS; SOFT TISSUE EVERY 12 HOURS SCHEDULED
Status: DISCONTINUED | OUTPATIENT
Start: 2021-02-04 | End: 2021-02-10

## 2021-02-04 RX ORDER — FUROSEMIDE 10 MG/ML
40 INJECTION INTRAMUSCULAR; INTRAVENOUS ONCE
Status: COMPLETED | OUTPATIENT
Start: 2021-02-04 | End: 2021-02-04

## 2021-02-04 RX ORDER — COLCHICINE 0.6 MG/1
0.6 TABLET ORAL DAILY
Status: DISCONTINUED | OUTPATIENT
Start: 2021-02-04 | End: 2021-02-16 | Stop reason: HOSPADM

## 2021-02-04 RX ADMIN — COLCHICINE 0.6 MG: 0.6 TABLET, FILM COATED ORAL at 17:40

## 2021-02-04 RX ADMIN — ZINC SULFATE 220 MG (50 MG) CAPSULE 220 MG: CAPSULE at 10:15

## 2021-02-04 RX ADMIN — REMDESIVIR 100 MG: 100 INJECTION, POWDER, LYOPHILIZED, FOR SOLUTION INTRAVENOUS at 04:05

## 2021-02-04 RX ADMIN — OXYCODONE HYDROCHLORIDE AND ACETAMINOPHEN 500 MG: 500 TABLET ORAL at 20:26

## 2021-02-04 RX ADMIN — ALBUTEROL SULFATE 2 PUFF: 90 AEROSOL, METERED RESPIRATORY (INHALATION) at 16:23

## 2021-02-04 RX ADMIN — ALPRAZOLAM 0.25 MG: 0.25 TABLET ORAL at 17:40

## 2021-02-04 RX ADMIN — SODIUM CHLORIDE, PRESERVATIVE FREE 10 ML: 5 INJECTION INTRAVENOUS at 10:16

## 2021-02-04 RX ADMIN — DOCUSATE SODIUM 100 MG: 100 CAPSULE ORAL at 10:15

## 2021-02-04 RX ADMIN — ALPRAZOLAM 0.25 MG: 0.25 TABLET ORAL at 20:26

## 2021-02-04 RX ADMIN — ALBUTEROL SULFATE 2 PUFF: 90 AEROSOL, METERED RESPIRATORY (INHALATION) at 09:00

## 2021-02-04 RX ADMIN — ASPIRIN 81 MG: 81 TABLET, CHEWABLE ORAL at 10:14

## 2021-02-04 RX ADMIN — Medication 3 MG: at 20:26

## 2021-02-04 RX ADMIN — FUROSEMIDE 40 MG: 10 INJECTION, SOLUTION INTRAMUSCULAR; INTRAVENOUS at 10:15

## 2021-02-04 RX ADMIN — ATORVASTATIN CALCIUM 10 MG: 10 TABLET, FILM COATED ORAL at 20:36

## 2021-02-04 RX ADMIN — ALBUTEROL SULFATE 2 PUFF: 90 AEROSOL, METERED RESPIRATORY (INHALATION) at 12:30

## 2021-02-04 RX ADMIN — DEXAMETHASONE SODIUM PHOSPHATE 6 MG: 4 INJECTION, SOLUTION INTRAMUSCULAR; INTRAVENOUS at 20:26

## 2021-02-04 RX ADMIN — HYDROCHLOROTHIAZIDE 25 MG: 25 TABLET ORAL at 10:14

## 2021-02-04 RX ADMIN — SODIUM CHLORIDE, PRESERVATIVE FREE 10 ML: 5 INJECTION INTRAVENOUS at 20:26

## 2021-02-04 RX ADMIN — ALPRAZOLAM 0.25 MG: 0.25 TABLET ORAL at 10:16

## 2021-02-04 RX ADMIN — ENOXAPARIN SODIUM 40 MG: 40 INJECTION SUBCUTANEOUS at 10:15

## 2021-02-04 RX ADMIN — FAMOTIDINE 20 MG: 20 TABLET, FILM COATED ORAL at 17:40

## 2021-02-04 RX ADMIN — ALBUTEROL SULFATE 2 PUFF: 90 AEROSOL, METERED RESPIRATORY (INHALATION) at 19:55

## 2021-02-04 RX ADMIN — OXYCODONE HYDROCHLORIDE AND ACETAMINOPHEN 500 MG: 500 TABLET ORAL at 10:14

## 2021-02-04 RX ADMIN — FAMOTIDINE 20 MG: 20 TABLET, FILM COATED ORAL at 10:15

## 2021-02-04 RX ADMIN — VERAPAMIL HYDROCHLORIDE 180 MG: 180 TABLET, FILM COATED, EXTENDED RELEASE ORAL at 10:15

## 2021-02-04 RX ADMIN — DEXAMETHASONE 6 MG: 4 TABLET ORAL at 10:14

## 2021-02-04 RX ADMIN — Medication 1000 UNITS: at 10:14

## 2021-02-04 NOTE — PROGRESS NOTES
[facility] Medicine Services  INPATIENT PROGRESS NOTE    Length of Stay: 2  Date of Admission: 2/2/2021  Primary Care Physician: Meredith Lloyd APRN    Subjective   Chief Complaint: Respiratory failure with hypoxia.  Covid pneumonia.    HPI   Breathing is worse.  Requiring BiPAP today.  Patient stated he is feeling better.  Plan for 40 of Lasix today, discussed with patient.    Review of Systems   Constitutional: Positive for activity change. Negative for appetite change, chills and fever.   HENT: Negative for hearing loss, nosebleeds, tinnitus and trouble swallowing.    Eyes: Negative for visual disturbance.   Respiratory: Positive for cough and shortness of breath. Negative for chest tightness and wheezing.    Cardiovascular: Negative for chest pain, palpitations and leg swelling.   Gastrointestinal: Negative for abdominal distention, abdominal pain, blood in stool, constipation, diarrhea, nausea and vomiting.   Endocrine: Negative for cold intolerance, heat intolerance, polydipsia, polyphagia and polyuria.   Genitourinary: Negative for decreased urine volume, difficulty urinating, dysuria, flank pain, frequency and hematuria.   Musculoskeletal: Negative for arthralgias, joint swelling and myalgias.   Skin: Negative for rash.   Allergic/Immunologic: Negative for immunocompromised state.   Neurological: Positive for weakness. Negative for dizziness, syncope, light-headedness and headaches.   Hematological: Negative for adenopathy. Does not bruise/bleed easily.   Psychiatric/Behavioral: Negative for confusion and sleep disturbance. The patient is not nervous/anxious.      All pertinent negatives and positives are as above. All other systems have been reviewed and are negative unless otherwise stated.     Objective    Temp:  [97.2 °F (36.2 °C)-98.5 °F (36.9 °C)] 98 °F (36.7 °C)  Heart Rate:  [58-94] 69  Resp:  [23-34] 30  BP: (123-159)/(62-84) 142/64    Intake/Output  Summary (Last 24 hours) at 2/4/2021 0915  Last data filed at 2/4/2021 0405  Gross per 24 hour   Intake 851 ml   Output 1400 ml   Net -549 ml     Physical Exam  Constitutional:       Appearance: He is well-developed.   HENT:      Head: Normocephalic.   Eyes:      General: No scleral icterus.     Pupils: Pupils are equal, round, and reactive to light.   Neck:      Musculoskeletal: Normal range of motion and neck supple.      Thyroid: No thyromegaly.      Vascular: No carotid bruit or JVD.      Trachea: No tracheal deviation.   Cardiovascular:      Rate and Rhythm: Normal rate and regular rhythm.      Heart sounds: No murmur. No friction rub. No gallop.    Pulmonary:      Effort: No respiratory distress.      Breath sounds: No wheezing or rales.      Comments: Decrease in breath sound bilateral, clear.  Patient is on BiPAP  Chest:      Chest wall: No tenderness.   Abdominal:      General: Bowel sounds are normal. There is no distension.      Palpations: Abdomen is soft.      Tenderness: There is no abdominal tenderness.      Comments: Obesity.   Musculoskeletal: Normal range of motion.   Skin:     General: Skin is warm and dry.      Capillary Refill: Capillary refill takes 2 to 3 seconds.      Findings: No rash.   Neurological:      General: No focal deficit present.      Mental Status: He is alert and oriented to person, place, and time.      Cranial Nerves: No cranial nerve deficit.      Motor: Weakness present.      Coordination: Coordination abnormal.   Psychiatric:         Behavior: Behavior normal.         Results Review:  Lab Results (last 24 hours)     Procedure Component Value Units Date/Time    Blood Culture - Blood, Arm, Left [[id number]] Collected: 02/02/21 0525    Specimen: Blood from Arm, Left Updated: 02/04/21 0645     Blood Culture No growth at 2 days    Blood Culture - Blood, Arm, Right [[id number]] Collected: 02/02/21 0525    Specimen: Blood from Arm, Right Updated: 02/04/21 0645     Blood Culture No  growth at 2 days    Blood Gas, Arterial - [[id number]]  (Abnormal) Collected: 02/04/21 0520    Specimen: Arterial Blood Updated: 02/04/21 0530     Site Left Radial     Jose's Test N/A     pH, Arterial 7.444 pH units      pCO2, Arterial 44.8 mm Hg      pO2, Arterial 67.0 mm Hg      Comment: 84 Value below reference range        HCO3, Arterial 30.7 mmol/L      Comment: 83 Value above reference range        Base Excess, Arterial 5.6 mmol/L      Comment: 83 Value above reference range        O2 Saturation, Arterial 93.5 %      Comment: 84 Value below reference range        Temperature 37.0 C      Barometric Pressure for Blood Gas 747 mmHg      Modality BiPap     FIO2 100 %      Ventilator Mode NA     IPAP 16     Comment: Meter: [id number]     :  [id number]        EPAP 8     Collected by [id number]     pCO2, Temperature Corrected 44.8 mm Hg      pH, Temp Corrected 7.444 pH Units      pO2, Temperature Corrected 67.0 mm Hg     Ferritin [[id number]]  (Abnormal) Collected: 02/04/21 0411    Specimen: Blood Updated: 02/04/21 0458     Ferritin 648.80 ng/mL     Narrative:      Results may be falsely decreased if patient taking Biotin.      Comprehensive Metabolic Panel [[id number]]  (Abnormal) Collected: 02/04/21 0411    Specimen: Blood Updated: 02/04/21 0455     Glucose 113 mg/dL      BUN 32 mg/dL      Creatinine 0.68 mg/dL      Sodium 137 mmol/L      Potassium 3.8 mmol/L      Chloride 100 mmol/L      CO2 28.0 mmol/L      Calcium 8.6 mg/dL      Total Protein 6.4 g/dL      Albumin 3.00 g/dL      ALT (SGPT) 18 U/L      AST (SGOT) 34 U/L      Alkaline Phosphatase 58 U/L      Total Bilirubin 0.3 mg/dL      eGFR Non African Amer 115 mL/min/1.73      Globulin 3.4 gm/dL      A/G Ratio 0.9 g/dL      BUN/Creatinine Ratio 47.1     Anion Gap 9.0 mmol/L     Narrative:      GFR Normal >60  Chronic Kidney Disease <60  Kidney Failure <15      C-reactive Protein [[id number]]  (Abnormal) Collected: 02/04/21 0411    Specimen: Blood Updated:  02/04/21 0455     C-Reactive Protein 9.79 mg/dL     CBC & Differential [[id number]]  (Abnormal) Collected: 02/04/21 0411    Specimen: Blood Updated: 02/04/21 0428    Narrative:      The following orders were created for panel order CBC & Differential.  Procedure                               Abnormality         Status                     ---------                               -----------         ------                     CBC Auto Differential[[id number]]        Abnormal            Final result                 Please view results for these tests on the individual orders.    CBC Auto Differential [[id number]]  (Abnormal) Collected: 02/04/21 0411    Specimen: Blood Updated: 02/04/21 0428     WBC 9.91 10*3/mm3      RBC 4.83 10*6/mm3      Hemoglobin 15.1 g/dL      Hematocrit 43.5 %      MCV 90.1 fL      MCH 31.3 pg      MCHC 34.7 g/dL      RDW 14.1 %      RDW-SD 46.6 fl      MPV 10.1 fL      Platelets 230 10*3/mm3      Neutrophil % 86.5 %      Lymphocyte % 8.8 %      Monocyte % 3.8 %      Eosinophil % 0.0 %      Basophil % 0.2 %      Immature Grans % 0.7 %      Neutrophils, Absolute 8.57 10*3/mm3      Lymphocytes, Absolute 0.87 10*3/mm3      Monocytes, Absolute 0.38 10*3/mm3      Eosinophils, Absolute 0.00 10*3/mm3      Basophils, Absolute 0.02 10*3/mm3      Immature Grans, Absolute 0.07 10*3/mm3      nRBC 0.0 /100 WBC     Blood Gas, Arterial - [[id number]]  (Abnormal) Collected: 02/03/21 1605    Specimen: Arterial Blood Updated: 02/03/21 1614     Site Right Radial     Jose's Test Positive     pH, Arterial 7.458 pH units      Comment: 83 Value above reference range        pCO2, Arterial 43.5 mm Hg      pO2, Arterial 84.0 mm Hg      HCO3, Arterial 30.8 mmol/L      Comment: 83 Value above reference range        Base Excess, Arterial 6.0 mmol/L      Comment: 83 Value above reference range        O2 Saturation, Arterial 96.6 %      Temperature 37.0 C      Barometric Pressure for Blood Gas 752 mmHg      Modality BiPap     FIO2  100 %      Ventilator Mode BiPAP     Set Mech Resp Rate 20.0     IPAP 16     Comment: Meter: [id number]     :  [id number]        EPAP 8     Collected by [id number]     pCO2, Temperature Corrected 43.5 mm Hg      pH, Temp Corrected 7.458 pH Units      pO2, Temperature Corrected 84.0 mm Hg     Respiratory Culture - Sputum, Cough [[id number]] Collected: 02/02/21 0357    Specimen: Sputum from Cough Updated: 02/03/21 1023     Respiratory Culture Heavy growth (4+) Normal Respiratory Cb: NO S.aureus/MRSA or Pseudomonas aeruginosa     Gram Stain Less than 25 WBCs per low power field      Few (2+) Epithelial cells per low power field      Few (2+) Mixed gram positive cb      Few (2+) Gram negative bacilli    Blood Gas, Arterial - [[id number]]  (Abnormal) Collected: 02/03/21 1000    Specimen: Arterial Blood Updated: 02/03/21 1009     Site Right Radial     Jose's Test Positive     pH, Arterial 7.509 pH units      Comment: 83 Value above reference range        pCO2, Arterial 38.0 mm Hg      pO2, Arterial 58.5 mm Hg      Comment: 84 Value below reference range        HCO3, Arterial 30.2 mmol/L      Comment: 83 Value above reference range        Base Excess, Arterial 6.7 mmol/L      Comment: 83 Value above reference range        O2 Saturation, Arterial 92.1 %      Comment: 84 Value below reference range        Temperature 37.0 C      Barometric Pressure for Blood Gas 756 mmHg      Modality Heated HFNC     FIO2 100 %      Flow Rate 30.0 lpm      Ventilator Mode NA     Collected by [id number]     Comment: Meter: [id number]     :  [id number]        pCO2, Temperature Corrected 38.0 mm Hg      pH, Temp Corrected 7.509 pH Units      pO2, Temperature Corrected 58.5 mm Hg            Cultures:  Blood Culture   Date Value Ref Range Status   02/02/2021 No growth at 2 days  Preliminary   02/02/2021 No growth at 2 days  Preliminary     Respiratory Culture   Date Value Ref Range Status   02/02/2021   Preliminary    Heavy  growth (4+) Normal Respiratory Jazz: NO S.aureus/MRSA or Pseudomonas aeruginosa       Radiology Data:    Imaging Results (Last 24 Hours)     ** No results found for the last 24 hours. **          No Known Allergies    Scheduled meds:   albuterol sulfate HFA, 2 puff, Inhalation, 4x Daily - RT  ALPRAZolam, 0.25 mg, Oral, TID  ascorbic acid, 500 mg, Oral, BID  aspirin, 81 mg, Oral, Daily  atorvastatin, 10 mg, Oral, Nightly  cholecalciferol, 1,000 Units, Oral, Daily  dexamethasone, 6 mg, Oral, Daily With Breakfast    Or  dexamethasone, 6 mg, Intravenous, Daily With Breakfast  docusate sodium, 100 mg, Oral, Daily  enoxaparin, 40 mg, Subcutaneous, Q24H  famotidine, 20 mg, Oral, BID AC  hydroCHLOROthiazide, 25 mg, Oral, Daily  melatonin, 3 mg, Oral, Nightly  multivitamin and minerals, 15 mL, Oral, Daily  polyethylene glycol, 17 g, Oral, Daily  remdesivir, 100 mg, Intravenous, Q24H  sodium chloride, 10 mL, Intravenous, Q12H  verapamil SR, 180 mg, Oral, Q24H  zinc sulfate, 220 mg, Oral, Daily        PRN meds:  •  acetaminophen **OR** acetaminophen  •  benzonatate  •  dextromethorphan polistirex ER  •  labetalol  •  ondansetron **OR** ondansetron  •  Pharmacy Consult - Remdesivir  •  sodium chloride    Assessment/Plan       Essential hypertension    Pneumonia due to COVID-19 virus    Acute respiratory failure with hypoxia (CMS/HCC)      Plan:  COVID-19 pneumonia/respiratory failure with hypoxia.  Previously was on antibiotics and Decadron outpatient.  Zinc.  Vitamin C.  Vitamin D.  Melatonin at night. Continue Decadron.  Continue remdesivir.  S/P Convalescent plasma x1.  Tessalon Perle.  Delsym.  Consult pulmonary.  Consult infectious disease.  Start colchicine.  CT scan and x-ray at [facility] -bilateral infiltrate consistent with Covid.  D-dimer was elevated, no PE per CTA.  Chest x-ray-Bilateral mid to lower lung zone opacities in the background of cardiomegaly- consider fluid overload- Superimposed  infection is not excluded.  Patient is currently on a BiPAP.     Hypertension/diastolic heart failure.  Continue verapamil.  Continue hydrochlorthiazide.  Continue Lipitor.  Labetalol as needed.  40 of Lasix now.  Echocardiogram-ejection fraction 56 to 60%, diastolic dysfunction grade 1, left ventricle cavity moderately dilated, moderate concentric hypertrophy, mild aortic valve stenosis.    Reflux.  Pepcid.  Zofran as needed.     Obesity.  BMI is 38.     History colon cancer.     Lovenox prophylaxis.     Nutrition.  Regular/cardiac diet.     Blood cultures-no growth 2 days.  Legionella antigen-negative.  Respiratory culture pending.  Strep pneumo-negative.  Mycoplasma-pending.     Discharge Planning: 3 to 6 days.    Electronically signed by Aime Rivera MD, 02/04/21, 9:15 AM CST.

## 2021-02-04 NOTE — PROGRESS NOTES
Infectious Diseases Progress Note    Patient:  Santiago Spence  YOB: 1950  MRN: [id number]   Admit date: 2/2/2021   Admitting Physician: Aime Rivera MD  Primary Care Physician: Meredith Lloyd APRN    Chief Complaint/Interval History: He is up to chair.  No productive cough.  He is on BiPAP.  On 100% FiO2.  He looks comfortable.  Near limits of what we can offer from oxygen support standpoint short of mechanical ventilation.  Explained treatment to the patient.  Explained we are giving him dexamethasone, blood thinner, oxygen support, and adjunctive treatments along with time.  In addition giving remdesivir.  He voiced understanding    Intake/Output Summary (Last 24 hours) at 2/4/2021 1611  Last data filed at 2/4/2021 0405  Gross per 24 hour   Intake 120 ml   Output 700 ml   Net -580 ml     Allergies: No Known Allergies  Current Scheduled Medications:   albuterol sulfate HFA, 2 puff, Inhalation, 4x Daily - RT  ALPRAZolam, 0.25 mg, Oral, TID  ascorbic acid, 500 mg, Oral, BID  aspirin, 81 mg, Oral, Daily  atorvastatin, 10 mg, Oral, Nightly  cholecalciferol, 1,000 Units, Oral, Daily  colchicine, 0.6 mg, Oral, Daily  dexamethasone, 6 mg, Oral, Daily With Breakfast    Or  dexamethasone, 6 mg, Intravenous, Daily With Breakfast  docusate sodium, 100 mg, Oral, Daily  enoxaparin, 40 mg, Subcutaneous, Q24H  famotidine, 20 mg, Oral, BID AC  hydroCHLOROthiazide, 25 mg, Oral, Daily  melatonin, 3 mg, Oral, Nightly  multivitamin and minerals, 15 mL, Oral, Daily  polyethylene glycol, 17 g, Oral, Daily  remdesivir, 100 mg, Intravenous, Q24H  sodium chloride, 10 mL, Intravenous, Q12H  verapamil SR, 180 mg, Oral, Q24H  zinc sulfate, 220 mg, Oral, Daily      Current PRN Medications:  •  acetaminophen **OR** acetaminophen  •  benzonatate  •  dextromethorphan polistirex ER  •  labetalol  •  ondansetron **OR** ondansetron  •  Pharmacy Consult - Remdesivir  •  sodium chloride    Review of Systems see HPI    Vital  "Signs:  /72   Pulse 73   Temp 98.2 °F (36.8 °C) (Axillary)   Resp 28   Ht 162.6 cm (64\")   Wt 102 kg (224 lb 10.4 oz)   SpO2 94%   BMI 38.56 kg/m²     Physical Exam  Vital signs - reviewed.  Line/IV site - No erythema, warmth, induration, or tenderness.  Lungs without crackles or wheezes  Extremities trace edema  General alert, oriented.  Mild tachypnea.    Lab Results:  CBC:   Results from last 7 days   Lab Units 02/04/21 0411 02/03/21 0430 02/02/21 0424   WBC 10*3/mm3 9.91 10.83* 8.07   HEMOGLOBIN g/dL 15.1 16.2 16.1   HEMATOCRIT % 43.5 47.9 48.8   PLATELETS 10*3/mm3 230 233 199     BMP:  Results from last 7 days   Lab Units 02/04/21 0411 02/03/21 0430 02/02/21 0424 02/01/21  1757   SODIUM mmol/L 137 136 137  --  137   POTASSIUM mmol/L 3.8 4.1 4.0  --  3.5   CHLORIDE mmol/L 100 97* 97*  --  98   TOTAL CO2 mmol/L  --   --   --   --  30   CO2 mmol/L 28.0 28.0 31.0*  --   --    BUN mg/dL 32* 29* 20  --  19*   CREATININE mg/dL 0.68* 0.84 0.79  --  0.85   GLUCOSE mg/dL 113* 115* 121*   < >  --    CALCIUM mg/dL 8.6 8.8 8.9  --  8.6   ALT (SGPT) U/L 18 20 23  --  31    < > = values in this interval not displayed.     Culture Results:   Blood Culture   Date Value Ref Range Status   02/02/2021 No growth at 2 days  Preliminary   02/02/2021 No growth at 2 days  Preliminary     Respiratory Culture   Date Value Ref Range Status   02/02/2021   Final    Heavy growth (4+) Normal Respiratory Jazz: NO S.aureus/MRSA or Pseudomonas aeruginosa     Radiology: None  Additional Studies Reviewed: None    Impression:   1.  COVID-19 infection  2.  Respiratory failure secondary to COVID-19  3.  Diastolic dysfunction  4.  Hypertension    Recommendations:   Continue 5-day course of remdesivir  Complete 10-day course of dexamethasone-going to increase dose to every 12 hours  Continue adjunctive treatments of vitamin C, vitamin D, and zinc  Not feel he needs antibiotic treatment at present  Continue to follow    Ronni SALMON" MD Srinivasan

## 2021-02-04 NOTE — PROGRESS NOTES
PULMONARY AND CRITICAL CARE PROGRESS NOTE - [facility]    Patient: Santiago Spence  1950   MR# [id number]   Acct# [id number]  02/04/21   08:33 CST  Referring Provider: Aime Rivera MD    Chief Complaint: Covid-19     Interval history: He was examined through the glass in the Covid unit to avoid unnecessary exposure to staff and preserve PPE.  He remains in the critical care portion of the Covid unit.  Transition to BiPAP for failure on Vapotherm.  He is currently sitting up in the chair and appears to be breathing comfortably on the BiPAP.  Respiratory rate 20.  BiPAP settings 16/8+ FiO2 100.  Saturation 96. No fever. No other complaints.    Meds:  albuterol sulfate HFA, 2 puff, Inhalation, 4x Daily - RT  ALPRAZolam, 0.25 mg, Oral, TID  ascorbic acid, 500 mg, Oral, BID  aspirin, 81 mg, Oral, Daily  atorvastatin, 10 mg, Oral, Nightly  cholecalciferol, 1,000 Units, Oral, Daily  dexamethasone, 6 mg, Oral, Daily With Breakfast    Or  dexamethasone, 6 mg, Intravenous, Daily With Breakfast  docusate sodium, 100 mg, Oral, Daily  enoxaparin, 40 mg, Subcutaneous, Q24H  famotidine, 20 mg, Oral, BID AC  hydroCHLOROthiazide, 25 mg, Oral, Daily  melatonin, 3 mg, Oral, Nightly  multivitamin and minerals, 15 mL, Oral, Daily  polyethylene glycol, 17 g, Oral, Daily  remdesivir, 100 mg, Intravenous, Q24H  sodium chloride, 10 mL, Intravenous, Q12H  verapamil SR, 180 mg, Oral, Q24H  zinc sulfate, 220 mg, Oral, Daily      Pharmacy Consult - Remdesivir,       Review of Systems:   Review of Systems:    Constitutional: Positive for fatigue and fever.   HENT: Positive for congestion and postnasal drip.    Eyes: Negative.    Respiratory: Positive for cough, chest tightness and wheezing.    Cardiovascular: Negative for chest pain, palpitations and leg swelling.   Gastrointestinal: Negative.  Negative for diarrhea, nausea and vomiting.   Endocrine: Negative.    Genitourinary: Negative.    Musculoskeletal: Positive for  arthralgias and back pain.   Skin: Negative.    Allergic/Immunologic: Positive for environmental allergies.   Neurological: Negative.    Hematological: Negative.    Psychiatric/Behavioral: The patient is nervous/anxious.      Physical Exam:  SpO2 Percentage    02/04/21 0400 02/04/21 0513 02/04/21 0600   SpO2: 97% 95% 91%     Temp:  [97.2 °F (36.2 °C)-98.5 °F (36.9 °C)] 98 °F (36.7 °C)  Heart Rate:  [58-94] 69  Resp:  [23-34] 30  BP: (123-159)/(62-84) 142/64    Intake/Output Summary (Last 24 hours) at 2/4/2021 0833  Last data filed at 2/4/2021 0405  Gross per 24 hour   Intake 851 ml   Output 1400 ml   Net -549 ml     GENERAL/CONSTITUTIONAL: No distress.   HEENT: atraumatic, normocephalic  NOSE: normal  NECK: jugular veins nondistended  CHEST: no paradox, no retractions.  No respiratory distress.   CARDIAC: Sinus rhythm  ABDOMEN: nondistended  : Deferred  EXTREMITIES: Mild lower extremity edema.  NEURO: Awake and alert, waving  SKIN: no jaundice.  No rash  Laboratory Data:  Results from last 7 days   Lab Units 02/04/21  0411 02/03/21  0430 02/02/21  0424   WBC 10*3/mm3 9.91 10.83* 8.07   HEMOGLOBIN g/dL 15.1 16.2 16.1   PLATELETS 10*3/mm3 230 233 199     Results from last 7 days   Lab Units 02/04/21  0411 02/03/21  0430 02/02/21  0424   SODIUM mmol/L 137 136 137   POTASSIUM mmol/L 3.8 4.1 4.0   BUN mg/dL 32* 29* 20   CREATININE mg/dL 0.68* 0.84 0.79   CRP mg/dL 9.79*  --  29.57*   PROCALCITONIN ng/mL  --   --  0.24   FERRITIN ng/mL 648.80*  --  525.00*   D DIMER QUANT mg/L (FEU)  --  0.81* 0.79*     Results from last 7 days   Lab Units 02/04/21  0520 02/03/21  1605 02/03/21  1000   PH, ARTERIAL pH units 7.444 7.458* 7.509*   PCO2, ARTERIAL mm Hg 44.8 43.5 38.0   PO2 ART mm Hg 67.0* 84.0 58.5*   FIO2 % 100 100 100     Blood Culture   Date Value Ref Range Status   02/02/2021 No growth at 2 days  Preliminary   02/02/2021 No growth at 2 days  Preliminary     Respiratory Culture   Date Value Ref Range Status   02/02/2021    Preliminary    Heavy growth (4+) Normal Respiratory Jazz: NO S.aureus/MRSA or Pseudomonas aeruginosa     Recent films:  Xr Chest 1 View    Result Date: 2/2/2021  Impression:  Bilateral mid to lower lung zone opacities in the background of cardiomegaly. Consider fluid overload. Superimposed infection is not excluded.  This report was finalized on 02/02/2021 13:24 by Dr. Ekta Landeros MD.    Films reviewed personally by me.  My interpretation: None today, 2-4-21    Pulmonary Assessment:    1. Covid-19  2. Acute respiratory failure with hypoxia related to COVID-19  3. Bilateral lung infiltrates  4. History of colon cancer  5. Obese  6. Obstructive sleep apnea, does not use his CPAP    Recommend:   · Supplemental oxygen and titrate as tolerated for goal around 90% sat, currently on BiPAP 16/8 and FiO2 100, increased to 16/10 per Dr. Ewing's request after ABG reviewed  · DVT prophylaxis, Lovenox, 40 daily  · Mobilize as tolerated  · Proning candidate: Yes if able to coordinate given continuous BiPAP need  · Dexamethasone day 3 of 10  · Remdesivir  · HFA bronchodilators  · Continue zinc, melatonin, Vitamin C and D  · Incentive, flutter  · Nutrition  · Prognosis guarded  · CODE STATUS full however patient has made some statements that he may not want to be intubated.  He wanted to discuss this further with his wife.  He does not appear to need that at this point however a decision should be made soon as able given his significant BiPAP requirements    Electronically signed by VALERIA Nguyen, 02/04/21, 08:33 CST     ATTESTATION OF CLINICAL NOTE:  I have reviewed the notes, assessments, and/or procedures performed by Marilee SHAW, I concur with her/his documentation of Santiago Spence.  She was seen in the follow-up visit in pulmonary rounds in intensive care unit today with Covid isolation from outside the glass door to reduce the risk of cross infection and exposure and to minimize the use of  PPE.    He remains on BiPAP and Vapotherm dependent.  His arterial blood gas morning shows slightly worsening oxygenation from yesterday.  He is little tired but overall reported he is feeling better.  He did not do prone ventilation yesterday.  He had been started on remdesivir, dexamethasone and also getting adjunct treatment with zinc melatonin vitamin C and vitamin D.  ID has been consulted.  He has some anxiety issues and initially he did not want to be intubated but currently he changed his mind and told he wanted to be intubated if needed but he does not want a trach and PEG and a long stay on the ventilator.    On physical examination per nursing assessment patient is a elderly  male who looks anxious on BiPAP otherwise stable.  HEENT: Atraumatic normocephalic.  Neck: Supple no mass no jugular venous distention no lymphadenopathy.  Heart sounds rate and rhythm regular no murmur.  Lungs: Bibasilar crackles and diffuse wheezes.  Abdomen: Soft nondistended nontender bowel sounds present.  Extremities: No edema normal pulses normal color.  Neurologic: Grossly intact.  Skin: No breakdown.    Continue current treatment plan with oxygen via Vapotherm when off BiPAP and keep on BiPAP with adjusted rate and repeat blood gas tomorrow morning.  Continue bronchodilator treatment with albuterol HFA.  Continue DVT and ulcer prophylaxis and pain and anxiety control.  For COVID-19 pneumonia continue remdesivir, dexamethasone, zinc melatonin vitamin C, vitamin D.  ID following.  Continue nutritional support.  Routine respiratory care and encourage incentive spirometry and Aerobika flutter valve.  Continue nutritional support.  Physical activity as tolerated.  Consider intubation mechanical ventilation if respiratory status worsens or if he does not improve.  Continue current meds and supportive care.  Continue respiratory and contact isolation.  Care plan discussed with RN and RT.  Pulmonary team will continue  following him and make further recommendations.  Prognosis: Guarded.    I have seen and examined patient personally, performing a face-to-face diagnostic evaluation with plan of care reviewed and developed with APRN and nursing staff. I have addended and/or modified the above history of present illness, physical examination, and assessment and plan to reflect my findings and impressions. Essential elements of the care plan were discussed with APRN above.  Agree with findings and assessment/plan as documented above.    Cora Ewing MD  Pulmonologist/Intensivist  2/4/2021 19:06 CST

## 2021-02-04 NOTE — PROGRESS NOTES
[facility] Medicine Services  INPATIENT PROGRESS NOTE    Length of Stay: 2  Date of Admission: 2/2/2021  Primary Care Physician: Meredith Lloyd APRN    Subjective   Chief Complaint:Respiratory failure with hypoxia.  Covid pneumonia.    HPI   Patient breathing is worse.  Patient maxing out on Vapotherm.  Patient denies any chest pain    Review of Systems   Constitutional: Positive for activity change. Negative for appetite change, chills and fever.   HENT: Negative for hearing loss, nosebleeds, tinnitus and trouble swallowing.    Eyes: Negative for visual disturbance.   Respiratory: Positive for cough and shortness of breath. Negative for chest tightness and wheezing.    Cardiovascular: Negative for chest pain, palpitations and leg swelling.   Gastrointestinal: Negative for abdominal distention, abdominal pain, blood in stool, constipation, diarrhea, nausea and vomiting.   Endocrine: Negative for cold intolerance, heat intolerance, polydipsia, polyphagia and polyuria.   Genitourinary: Negative for decreased urine volume, difficulty urinating, dysuria, flank pain, frequency and hematuria.   Musculoskeletal: Negative for arthralgias, joint swelling and myalgias.   Skin: Negative for rash.   Allergic/Immunologic: Negative for immunocompromised state.   Neurological: Positive for weakness. Negative for dizziness, syncope, light-headedness and headaches.   Hematological: Negative for adenopathy. Does not bruise/bleed easily.   Psychiatric/Behavioral: Negative for confusion and sleep disturbance. The patient is not nervous/anxious.           All pertinent negatives and positives are as above. All other systems have been reviewed and are negative unless otherwise stated.     Objective    Temp:  [97.2 °F (36.2 °C)-98.5 °F (36.9 °C)] 98 °F (36.7 °C)  Heart Rate:  [58-94] 69  Resp:  [23-34] 30  BP: (123-159)/(62-84) 142/64    Intake/Output Summary (Last 24 hours) at 2/4/2021  1001  Last data filed at 2/4/2021 0405  Gross per 24 hour   Intake 600 ml   Output 1200 ml   Net -600 ml     Physical Exam  Vitals signs and nursing note reviewed.   Constitutional:       Appearance: He is well-developed.   HENT:      Head: Normocephalic and atraumatic.   Eyes:      General: No scleral icterus.     Pupils: Pupils are equal, round, and reactive to light.   Neck:      Musculoskeletal: Normal range of motion and neck supple.      Thyroid: No thyromegaly.      Vascular: No carotid bruit or JVD.      Trachea: No tracheal deviation.   Cardiovascular:      Rate and Rhythm: Normal rate and regular rhythm.      Heart sounds: No murmur. No friction rub. No gallop.    Pulmonary:      Effort: Pulmonary effort is normal. No respiratory distress.      Breath sounds: Normal breath sounds. No wheezing or rales.   Chest:      Chest wall: No tenderness.   Abdominal:      General: Bowel sounds are normal. There is no distension.      Palpations: Abdomen is soft.      Tenderness: There is no abdominal tenderness.      Comments: Obesity.   Musculoskeletal: Normal range of motion.   Skin:     General: Skin is warm and dry.      Capillary Refill: Capillary refill takes 2 to 3 seconds.      Findings: No rash.   Neurological:      Mental Status: He is alert and oriented to person, place, and time.      Cranial Nerves: No cranial nerve deficit.   Psychiatric:         Behavior: Behavior normal.      Results Review:  Lab Results (last 24 hours)     Procedure Component Value Units Date/Time    Blood Culture - Blood, Arm, Left [[id number]] Collected: 02/02/21 0525    Specimen: Blood from Arm, Left Updated: 02/04/21 0645     Blood Culture No growth at 2 days    Blood Culture - Blood, Arm, Right [[id number]] Collected: 02/02/21 0525    Specimen: Blood from Arm, Right Updated: 02/04/21 0645     Blood Culture No growth at 2 days    Blood Gas, Arterial - [[id number]]  (Abnormal) Collected: 02/04/21 0520    Specimen: Arterial Blood  Updated: 02/04/21 0530     Site Left Radial     Jose's Test N/A     pH, Arterial 7.444 pH units      pCO2, Arterial 44.8 mm Hg      pO2, Arterial 67.0 mm Hg      Comment: 84 Value below reference range        HCO3, Arterial 30.7 mmol/L      Comment: 83 Value above reference range        Base Excess, Arterial 5.6 mmol/L      Comment: 83 Value above reference range        O2 Saturation, Arterial 93.5 %      Comment: 84 Value below reference range        Temperature 37.0 C      Barometric Pressure for Blood Gas 747 mmHg      Modality BiPap     FIO2 100 %      Ventilator Mode NA     IPAP 16     Comment: Meter: [id number]     :  [id number]        EPAP 8     Collected by [id number]     pCO2, Temperature Corrected 44.8 mm Hg      pH, Temp Corrected 7.444 pH Units      pO2, Temperature Corrected 67.0 mm Hg     Ferritin [[id number]]  (Abnormal) Collected: 02/04/21 0411    Specimen: Blood Updated: 02/04/21 0458     Ferritin 648.80 ng/mL     Narrative:      Results may be falsely decreased if patient taking Biotin.      Comprehensive Metabolic Panel [[id number]]  (Abnormal) Collected: 02/04/21 0411    Specimen: Blood Updated: 02/04/21 0455     Glucose 113 mg/dL      BUN 32 mg/dL      Creatinine 0.68 mg/dL      Sodium 137 mmol/L      Potassium 3.8 mmol/L      Chloride 100 mmol/L      CO2 28.0 mmol/L      Calcium 8.6 mg/dL      Total Protein 6.4 g/dL      Albumin 3.00 g/dL      ALT (SGPT) 18 U/L      AST (SGOT) 34 U/L      Alkaline Phosphatase 58 U/L      Total Bilirubin 0.3 mg/dL      eGFR Non African Amer 115 mL/min/1.73      Globulin 3.4 gm/dL      A/G Ratio 0.9 g/dL      BUN/Creatinine Ratio 47.1     Anion Gap 9.0 mmol/L     Narrative:      GFR Normal >60  Chronic Kidney Disease <60  Kidney Failure <15      C-reactive Protein [[id number]]  (Abnormal) Collected: 02/04/21 0411    Specimen: Blood Updated: 02/04/21 0455     C-Reactive Protein 9.79 mg/dL     CBC & Differential [[id number]]  (Abnormal) Collected: 02/04/21  0411    Specimen: Blood Updated: 02/04/21 0428    Narrative:      The following orders were created for panel order CBC & Differential.  Procedure                               Abnormality         Status                     ---------                               -----------         ------                     CBC Auto Differential[[id number]]        Abnormal            Final result                 Please view results for these tests on the individual orders.    CBC Auto Differential [[id number]]  (Abnormal) Collected: 02/04/21 0411    Specimen: Blood Updated: 02/04/21 0428     WBC 9.91 10*3/mm3      RBC 4.83 10*6/mm3      Hemoglobin 15.1 g/dL      Hematocrit 43.5 %      MCV 90.1 fL      MCH 31.3 pg      MCHC 34.7 g/dL      RDW 14.1 %      RDW-SD 46.6 fl      MPV 10.1 fL      Platelets 230 10*3/mm3      Neutrophil % 86.5 %      Lymphocyte % 8.8 %      Monocyte % 3.8 %      Eosinophil % 0.0 %      Basophil % 0.2 %      Immature Grans % 0.7 %      Neutrophils, Absolute 8.57 10*3/mm3      Lymphocytes, Absolute 0.87 10*3/mm3      Monocytes, Absolute 0.38 10*3/mm3      Eosinophils, Absolute 0.00 10*3/mm3      Basophils, Absolute 0.02 10*3/mm3      Immature Grans, Absolute 0.07 10*3/mm3      nRBC 0.0 /100 WBC     Blood Gas, Arterial - [[id number]]  (Abnormal) Collected: 02/03/21 1605    Specimen: Arterial Blood Updated: 02/03/21 1614     Site Right Radial     Jose's Test Positive     pH, Arterial 7.458 pH units      Comment: 83 Value above reference range        pCO2, Arterial 43.5 mm Hg      pO2, Arterial 84.0 mm Hg      HCO3, Arterial 30.8 mmol/L      Comment: 83 Value above reference range        Base Excess, Arterial 6.0 mmol/L      Comment: 83 Value above reference range        O2 Saturation, Arterial 96.6 %      Temperature 37.0 C      Barometric Pressure for Blood Gas 752 mmHg      Modality BiPap     FIO2 100 %      Ventilator Mode BiPAP     Set Mech Resp Rate 20.0     IPAP 16     Comment: Meter: [id number]      :  [id number]        EPAP 8     Collected by [id number]     pCO2, Temperature Corrected 43.5 mm Hg      pH, Temp Corrected 7.458 pH Units      pO2, Temperature Corrected 84.0 mm Hg     Respiratory Culture - Sputum, Cough [[id number]] Collected: 02/02/21 0357    Specimen: Sputum from Cough Updated: 02/03/21 1023     Respiratory Culture Heavy growth (4+) Normal Respiratory Cb: NO S.aureus/MRSA or Pseudomonas aeruginosa     Gram Stain Less than 25 WBCs per low power field      Few (2+) Epithelial cells per low power field      Few (2+) Mixed gram positive cb      Few (2+) Gram negative bacilli    Blood Gas, Arterial - [[id number]]  (Abnormal) Collected: 02/03/21 1000    Specimen: Arterial Blood Updated: 02/03/21 1009     Site Right Radial     Jose's Test Positive     pH, Arterial 7.509 pH units      Comment: 83 Value above reference range        pCO2, Arterial 38.0 mm Hg      pO2, Arterial 58.5 mm Hg      Comment: 84 Value below reference range        HCO3, Arterial 30.2 mmol/L      Comment: 83 Value above reference range        Base Excess, Arterial 6.7 mmol/L      Comment: 83 Value above reference range        O2 Saturation, Arterial 92.1 %      Comment: 84 Value below reference range        Temperature 37.0 C      Barometric Pressure for Blood Gas 756 mmHg      Modality Heated HFNC     FIO2 100 %      Flow Rate 30.0 lpm      Ventilator Mode NA     Collected by [id number]     Comment: Meter: [id number]     :  [id number]        pCO2, Temperature Corrected 38.0 mm Hg      pH, Temp Corrected 7.509 pH Units      pO2, Temperature Corrected 58.5 mm Hg            Cultures:  Blood Culture   Date Value Ref Range Status   02/02/2021 No growth at 2 days  Preliminary   02/02/2021 No growth at 2 days  Preliminary     Respiratory Culture   Date Value Ref Range Status   02/02/2021   Preliminary    Heavy growth (4+) Normal Respiratory Cb: NO S.aureus/MRSA or Pseudomonas aeruginosa       Radiology Data:    Imaging  Results (Last 24 Hours)     ** No results found for the last 24 hours. **          No Known Allergies    Scheduled meds:   albuterol sulfate HFA, 2 puff, Inhalation, 4x Daily - RT  ALPRAZolam, 0.25 mg, Oral, TID  ascorbic acid, 500 mg, Oral, BID  aspirin, 81 mg, Oral, Daily  atorvastatin, 10 mg, Oral, Nightly  cholecalciferol, 1,000 Units, Oral, Daily  dexamethasone, 6 mg, Oral, Daily With Breakfast    Or  dexamethasone, 6 mg, Intravenous, Daily With Breakfast  docusate sodium, 100 mg, Oral, Daily  enoxaparin, 40 mg, Subcutaneous, Q24H  famotidine, 20 mg, Oral, BID AC  furosemide, 40 mg, Intravenous, Once  hydroCHLOROthiazide, 25 mg, Oral, Daily  melatonin, 3 mg, Oral, Nightly  multivitamin and minerals, 15 mL, Oral, Daily  polyethylene glycol, 17 g, Oral, Daily  remdesivir, 100 mg, Intravenous, Q24H  sodium chloride, 10 mL, Intravenous, Q12H  verapamil SR, 180 mg, Oral, Q24H  zinc sulfate, 220 mg, Oral, Daily        PRN meds:  •  acetaminophen **OR** acetaminophen  •  benzonatate  •  dextromethorphan polistirex ER  •  labetalol  •  ondansetron **OR** ondansetron  •  Pharmacy Consult - Remdesivir  •  sodium chloride    Assessment/Plan       Essential hypertension    Pneumonia due to COVID-19 virus    Acute respiratory failure with hypoxia (CMS/HCC)      Plan:  COVID-19 pneumonia/respiratory failure with hypoxia.  Previously was on antibiotics and Decadron outpatient.  Zinc.  Vitamin C.  Vitamin D.  Melatonin at night. Continue Decadron.  Continue remdesivir.  S/P Convalescent plasma x1.  Tessalon Perle.  Delsym.  Consult pulmonary.  Consult infectious disease.  CT scan and [facility] at [facility] -bilateral infiltrate consistent with Covid.  D-dimer was elevated, no PE per CTA.  Vapotherm 30/95.  Chest x-ray-Bilateral mid to lower lung zone opacities in the background of cardiomegaly- consider fluid overload- Superimposed infection is not excluded.     Hypertension.  Continue verapamil.  Continue  hydrochlorthiazide.  Continue Lipitor.  Labetalol as needed.  Echocardiogram-preliminary ejection fraction 64%, pending official radiologist reading.     Reflux.  Pepcid.  Zofran as needed.     Obesity.  BMI is 38.     History colon cancer.     Lovenox prophylaxis.     Nutrition.  Regular/cardiac diet.     Blood cultures-no growth 24 hours.  Legionella antigen-negative.  Respiratory culture pending.  Strep pneumo-negative.  Mycoplasma-pending.     Discharge Planning: 3 to 6 days.    Electronically signed by Aime Rivera MD, 02/03/21, 10:01 AM CST.

## 2021-02-05 LAB
ALBUMIN SERPL-MCNC: 3 G/DL (ref 3.5–5.2)
ALBUMIN/GLOB SERPL: 0.9 G/DL
ALP SERPL-CCNC: 61 U/L (ref 39–117)
ALT SERPL W P-5'-P-CCNC: 16 U/L (ref 1–41)
ANION GAP SERPL CALCULATED.3IONS-SCNC: 10 MMOL/L (ref 5–15)
ARTERIAL PATENCY WRIST A: ABNORMAL
AST SERPL-CCNC: 31 U/L (ref 1–40)
ATMOSPHERIC PRESS: 748 MMHG
BASE EXCESS BLDA CALC-SCNC: 6.4 MMOL/L (ref 0–2)
BASOPHILS # BLD AUTO: 0.03 10*3/MM3 (ref 0–0.2)
BASOPHILS NFR BLD AUTO: 0.3 % (ref 0–1.5)
BDY SITE: ABNORMAL
BILIRUB SERPL-MCNC: 0.4 MG/DL (ref 0–1.2)
BODY TEMPERATURE: 37 C
BUN SERPL-MCNC: 35 MG/DL (ref 8–23)
BUN/CREAT SERPL: 49.3 (ref 7–25)
CALCIUM SPEC-SCNC: 8.9 MG/DL (ref 8.6–10.5)
CHLORIDE SERPL-SCNC: 98 MMOL/L (ref 98–107)
CO2 SERPL-SCNC: 30 MMOL/L (ref 22–29)
CREAT SERPL-MCNC: 0.71 MG/DL (ref 0.76–1.27)
D DIMER PPP FEU-MCNC: 0.62 MG/L (FEU) (ref 0–0.5)
DEPRECATED RDW RBC AUTO: 44.8 FL (ref 37–54)
EOSINOPHIL # BLD AUTO: 0 10*3/MM3 (ref 0–0.4)
EOSINOPHIL NFR BLD AUTO: 0 % (ref 0.3–6.2)
EPAP: 10
ERYTHROCYTE [DISTWIDTH] IN BLOOD BY AUTOMATED COUNT: 13.7 % (ref 12.3–15.4)
FIBRINOGEN PPP-MCNC: 837 MG/DL (ref 240–460)
GFR SERPL CREATININE-BSD FRML MDRD: 109 ML/MIN/1.73
GLOBULIN UR ELPH-MCNC: 3.5 GM/DL
GLUCOSE SERPL-MCNC: 126 MG/DL (ref 65–99)
HCO3 BLDA-SCNC: 32 MMOL/L (ref 20–26)
HCT VFR BLD AUTO: 44.9 % (ref 37.5–51)
HGB BLD-MCNC: 15.7 G/DL (ref 13–17.7)
IMM GRANULOCYTES # BLD AUTO: 0.07 10*3/MM3 (ref 0–0.05)
IMM GRANULOCYTES NFR BLD AUTO: 0.8 % (ref 0–0.5)
INHALED O2 CONCENTRATION: 100 %
IPAP: 16
LDH SERPL-CCNC: 498 U/L (ref 135–225)
LYMPHOCYTES # BLD AUTO: 0.72 10*3/MM3 (ref 0.7–3.1)
LYMPHOCYTES NFR BLD AUTO: 8.2 % (ref 19.6–45.3)
Lab: ABNORMAL
MCH RBC QN AUTO: 31.2 PG (ref 26.6–33)
MCHC RBC AUTO-ENTMCNC: 35 G/DL (ref 31.5–35.7)
MCV RBC AUTO: 89.3 FL (ref 79–97)
MODALITY: ABNORMAL
MONOCYTES # BLD AUTO: 0.36 10*3/MM3 (ref 0.1–0.9)
MONOCYTES NFR BLD AUTO: 4.1 % (ref 5–12)
MRSA DNA SPEC QL NAA+PROBE: NORMAL
NEUTROPHILS NFR BLD AUTO: 7.62 10*3/MM3 (ref 1.7–7)
NEUTROPHILS NFR BLD AUTO: 86.6 % (ref 42.7–76)
NRBC BLD AUTO-RTO: 0 /100 WBC (ref 0–0.2)
PCO2 BLDA: 47.3 MM HG (ref 35–45)
PCO2 TEMP ADJ BLD: 47.3 MM HG (ref 35–45)
PH BLDA: 7.44 PH UNITS (ref 7.35–7.45)
PH, TEMP CORRECTED: 7.44 PH UNITS (ref 7.35–7.45)
PLATELET # BLD AUTO: 256 10*3/MM3 (ref 140–450)
PMV BLD AUTO: 10 FL (ref 6–12)
PO2 BLDA: 70.3 MM HG (ref 83–108)
PO2 TEMP ADJ BLD: 70.3 MM HG (ref 83–108)
POTASSIUM SERPL-SCNC: 4 MMOL/L (ref 3.5–5.2)
PROT SERPL-MCNC: 6.5 G/DL (ref 6–8.5)
RBC # BLD AUTO: 5.03 10*6/MM3 (ref 4.14–5.8)
SAO2 % BLDCOA: 94.2 % (ref 94–99)
SET MECH RESP RATE: 20
SODIUM SERPL-SCNC: 138 MMOL/L (ref 136–145)
VENTILATOR MODE: ABNORMAL
WBC # BLD AUTO: 8.8 10*3/MM3 (ref 3.4–10.8)

## 2021-02-05 PROCEDURE — 94799 UNLISTED PULMONARY SVC/PX: CPT

## 2021-02-05 PROCEDURE — 94660 CPAP INITIATION&MGMT: CPT

## 2021-02-05 PROCEDURE — 63710000001 DEXAMETHASONE PER 0.25 MG: Performed by: INTERNAL MEDICINE

## 2021-02-05 PROCEDURE — 87641 MR-STAPH DNA AMP PROBE: CPT | Performed by: INTERNAL MEDICINE

## 2021-02-05 PROCEDURE — 80053 COMPREHEN METABOLIC PANEL: CPT | Performed by: INTERNAL MEDICINE

## 2021-02-05 PROCEDURE — 85025 COMPLETE CBC W/AUTO DIFF WBC: CPT | Performed by: INTERNAL MEDICINE

## 2021-02-05 PROCEDURE — 25010000002 FUROSEMIDE PER 20 MG: Performed by: FAMILY MEDICINE

## 2021-02-05 PROCEDURE — 85384 FIBRINOGEN ACTIVITY: CPT | Performed by: INTERNAL MEDICINE

## 2021-02-05 PROCEDURE — 25010000002 ENOXAPARIN PER 10 MG: Performed by: INTERNAL MEDICINE

## 2021-02-05 PROCEDURE — 36600 WITHDRAWAL OF ARTERIAL BLOOD: CPT

## 2021-02-05 PROCEDURE — 83615 LACTATE (LD) (LDH) ENZYME: CPT | Performed by: INTERNAL MEDICINE

## 2021-02-05 PROCEDURE — 25010000002 MEROPENEM PER 100 MG: Performed by: INTERNAL MEDICINE

## 2021-02-05 PROCEDURE — 85379 FIBRIN DEGRADATION QUANT: CPT | Performed by: INTERNAL MEDICINE

## 2021-02-05 PROCEDURE — 82803 BLOOD GASES ANY COMBINATION: CPT

## 2021-02-05 PROCEDURE — 87070 CULTURE OTHR SPECIMN AEROBIC: CPT | Performed by: INTERNAL MEDICINE

## 2021-02-05 PROCEDURE — 99233 SBSQ HOSP IP/OBS HIGH 50: CPT | Performed by: INTERNAL MEDICINE

## 2021-02-05 PROCEDURE — 87205 SMEAR GRAM STAIN: CPT | Performed by: INTERNAL MEDICINE

## 2021-02-05 RX ORDER — VERAPAMIL HYDROCHLORIDE 240 MG/1
240 TABLET, FILM COATED, EXTENDED RELEASE ORAL NIGHTLY
COMMUNITY
End: 2021-02-16 | Stop reason: HOSPADM

## 2021-02-05 RX ORDER — FUROSEMIDE 10 MG/ML
40 INJECTION INTRAMUSCULAR; INTRAVENOUS ONCE
Status: COMPLETED | OUTPATIENT
Start: 2021-02-05 | End: 2021-02-05

## 2021-02-05 RX ADMIN — DEXAMETHASONE 6 MG: 4 TABLET ORAL at 08:54

## 2021-02-05 RX ADMIN — SODIUM CHLORIDE, PRESERVATIVE FREE 10 ML: 5 INJECTION INTRAVENOUS at 08:55

## 2021-02-05 RX ADMIN — ACETAMINOPHEN 650 MG: 325 TABLET, FILM COATED ORAL at 20:27

## 2021-02-05 RX ADMIN — Medication 1000 UNITS: at 08:54

## 2021-02-05 RX ADMIN — MEROPENEM 1 G: 1 INJECTION, POWDER, FOR SOLUTION INTRAVENOUS at 18:17

## 2021-02-05 RX ADMIN — DOCUSATE SODIUM 100 MG: 100 CAPSULE ORAL at 08:55

## 2021-02-05 RX ADMIN — OXYCODONE HYDROCHLORIDE AND ACETAMINOPHEN 500 MG: 500 TABLET ORAL at 20:27

## 2021-02-05 RX ADMIN — VERAPAMIL HYDROCHLORIDE 180 MG: 180 TABLET, FILM COATED, EXTENDED RELEASE ORAL at 08:54

## 2021-02-05 RX ADMIN — ALBUTEROL SULFATE 2 PUFF: 90 AEROSOL, METERED RESPIRATORY (INHALATION) at 07:17

## 2021-02-05 RX ADMIN — COLCHICINE 0.6 MG: 0.6 TABLET, FILM COATED ORAL at 08:54

## 2021-02-05 RX ADMIN — ALBUTEROL SULFATE 2 PUFF: 90 AEROSOL, METERED RESPIRATORY (INHALATION) at 10:50

## 2021-02-05 RX ADMIN — ALPRAZOLAM 0.25 MG: 0.25 TABLET ORAL at 20:27

## 2021-02-05 RX ADMIN — Medication 15 ML: at 10:34

## 2021-02-05 RX ADMIN — ATORVASTATIN CALCIUM 10 MG: 10 TABLET, FILM COATED ORAL at 20:27

## 2021-02-05 RX ADMIN — ALPRAZOLAM 0.25 MG: 0.25 TABLET ORAL at 16:46

## 2021-02-05 RX ADMIN — Medication 3 MG: at 20:27

## 2021-02-05 RX ADMIN — POLYETHYLENE GLYCOL (3350) 17 G: 17 POWDER, FOR SOLUTION ORAL at 08:55

## 2021-02-05 RX ADMIN — ASPIRIN 81 MG: 81 TABLET, CHEWABLE ORAL at 08:54

## 2021-02-05 RX ADMIN — SODIUM CHLORIDE, PRESERVATIVE FREE 10 ML: 5 INJECTION INTRAVENOUS at 20:27

## 2021-02-05 RX ADMIN — HYDROCHLOROTHIAZIDE 25 MG: 25 TABLET ORAL at 08:54

## 2021-02-05 RX ADMIN — FUROSEMIDE 40 MG: 10 INJECTION, SOLUTION INTRAMUSCULAR; INTRAVENOUS at 10:34

## 2021-02-05 RX ADMIN — DEXAMETHASONE 6 MG: 4 TABLET ORAL at 20:27

## 2021-02-05 RX ADMIN — FAMOTIDINE 20 MG: 20 TABLET, FILM COATED ORAL at 16:46

## 2021-02-05 RX ADMIN — OXYCODONE HYDROCHLORIDE AND ACETAMINOPHEN 500 MG: 500 TABLET ORAL at 08:55

## 2021-02-05 RX ADMIN — ALPRAZOLAM 0.25 MG: 0.25 TABLET ORAL at 08:54

## 2021-02-05 RX ADMIN — REMDESIVIR 100 MG: 100 INJECTION, POWDER, LYOPHILIZED, FOR SOLUTION INTRAVENOUS at 04:44

## 2021-02-05 RX ADMIN — ALBUTEROL SULFATE 2 PUFF: 90 AEROSOL, METERED RESPIRATORY (INHALATION) at 19:46

## 2021-02-05 RX ADMIN — ENOXAPARIN SODIUM 40 MG: 40 INJECTION SUBCUTANEOUS at 08:55

## 2021-02-05 RX ADMIN — ALBUTEROL SULFATE 2 PUFF: 90 AEROSOL, METERED RESPIRATORY (INHALATION) at 14:39

## 2021-02-05 RX ADMIN — FAMOTIDINE 20 MG: 20 TABLET, FILM COATED ORAL at 08:54

## 2021-02-05 RX ADMIN — ZINC SULFATE 220 MG (50 MG) CAPSULE 220 MG: CAPSULE at 08:54

## 2021-02-05 NOTE — PROGRESS NOTES
Infectious Diseases Progress Note    Patient:  Santiago Spence  YOB: 1950  MRN: [id number]   Admit date: 2/2/2021   Admitting Physician: Aime Rivera MD  Primary Care Physician: Meredith Lloyd APRN    Chief Complaint/Interval History: He remains on BiPAP.  He is on 40 L and 100%.  Marginal saturations.  Talked with him about proning.  He is going to work with nurse to give proning a try.  He has had some increasing sputum production per discussion with nursing.  He is without fever.  He is hemodynamically stable.    Intake/Output Summary (Last 24 hours) at 2/5/2021 1622  Last data filed at 2/5/2021 1200  Gross per 24 hour   Intake 1090 ml   Output 2550 ml   Net -1460 ml     Allergies: No Known Allergies  Current Scheduled Medications:   albuterol sulfate HFA, 2 puff, Inhalation, 4x Daily - RT  ALPRAZolam, 0.25 mg, Oral, TID  ascorbic acid, 500 mg, Oral, BID  aspirin, 81 mg, Oral, Daily  atorvastatin, 10 mg, Oral, Nightly  cholecalciferol, 1,000 Units, Oral, Daily  colchicine, 0.6 mg, Oral, Daily  dexamethasone, 6 mg, Oral, Q12H    Or  dexamethasone, 6 mg, Intravenous, Q12H  docusate sodium, 100 mg, Oral, Daily  enoxaparin, 40 mg, Subcutaneous, Q24H  famotidine, 20 mg, Oral, BID AC  hydroCHLOROthiazide, 25 mg, Oral, Daily  melatonin, 3 mg, Oral, Nightly  multivitamin and minerals, 15 mL, Oral, Daily  polyethylene glycol, 17 g, Oral, Daily  remdesivir, 100 mg, Intravenous, Q24H  sodium chloride, 10 mL, Intravenous, Q12H  verapamil SR, 180 mg, Oral, Q24H  zinc sulfate, 220 mg, Oral, Daily      Current PRN Medications:  •  acetaminophen **OR** acetaminophen  •  benzonatate  •  dextromethorphan polistirex ER  •  labetalol  •  ondansetron **OR** ondansetron  •  Pharmacy Consult - Remdesivir  •  sodium chloride    Review of Systems no nausea, diarrhea, rash.  No abdominal pain or discomfort.    Vital Signs:  /70 (BP Location: Left arm, Patient Position: Lying)   Pulse 74   Temp 97.4 °F (36.3  "°C) (Axillary)   Resp 25   Ht 162.6 cm (64\")   Wt 101 kg (222 lb 3.6 oz)   SpO2 95%   BMI 38.14 kg/m²     Physical Exam  Vital signs - reviewed.  Line/IV site - No erythema, warmth, induration, or tenderness.  Lungs without crackles or wheezes  General he is tachypneic  He is on BiPAP  He is alert and oriented    Lab Results:  CBC:   Results from last 7 days   Lab Units 02/05/21 0445 02/04/21 0411 02/03/21 0430 02/02/21 0424   WBC 10*3/mm3 8.80 9.91 10.83* 8.07   HEMOGLOBIN g/dL 15.7 15.1 16.2 16.1   HEMATOCRIT % 44.9 43.5 47.9 48.8   PLATELETS 10*3/mm3 256 230 233 199     BMP:  Results from last 7 days   Lab Units 02/05/21 0445 02/04/21 0411 02/03/21 0430 02/02/21 0424 02/01/21  1757   SODIUM mmol/L 138 137 136 137  --  137   POTASSIUM mmol/L 4.0 3.8 4.1 4.0  --  3.5   CHLORIDE mmol/L 98 100 97* 97*  --  98   TOTAL CO2 mmol/L  --   --   --   --   --  30   CO2 mmol/L 30.0* 28.0 28.0 31.0*  --   --    BUN mg/dL 35* 32* 29* 20  --  19*   CREATININE mg/dL 0.71* 0.68* 0.84 0.79  --  0.85   GLUCOSE mg/dL 126* 113* 115* 121*   < >  --    CALCIUM mg/dL 8.9 8.6 8.8 8.9  --  8.6   ALT (SGPT) U/L 16 18 20 23  --  31    < > = values in this interval not displayed.     Culture Results:   Blood Culture   Date Value Ref Range Status   02/02/2021 No growth at 3 days  Preliminary   02/02/2021 No growth at 3 days  Preliminary     Respiratory Culture   Date Value Ref Range Status   02/02/2021   Final    Heavy growth (4+) Normal Respiratory Jazz: NO S.aureus/MRSA or Pseudomonas aeruginosa     Radiology: None  Additional Studies Reviewed: None    Impression:   1.  COVID-19 infection  2.  Respiratory failure secondary to COVID-19  3.  Diastolic dysfunction  4.  Hypertension    Recommendations:   Complete 5-day course of remdesivir  Continue dexamethasone-continuing every 12 hour dosing at 6 mg  Given increasing sputum production going to repeat respiratory culture and also begin empiric antibiotic treatment  Check MRSA " screen  Minto treatment with meropenem 1 g IV every 8.  If MRSA screen positive will add vancomycin.  Continue supportive care  He is going to try proning  Continue to follow    Ronni Mattson MD

## 2021-02-05 NOTE — PROGRESS NOTES
Continued Stay Note   [facility]     Patient Name: Santiago Spence  MRN: [id number]  Today's Date: 2/5/2021    Admit Date: 2/2/2021    Discharge Plan     Row Name 02/05/21 1048       Plan    Plan  unclear/LTAC?    Plan Comments  Patient in ICU on vapotherm.  Could utilize LTAC unit if patient has difficulty weaning off vapotherm.  SW will follow and assist with plan/needs.        Discharge Codes    No documentation.             VIPUL Nava

## 2021-02-05 NOTE — PROGRESS NOTES
PULMONARY AND CRITICAL CARE PROGRESS NOTE - [facility]    Patient: Santiago Spence  1950   MR# [id number]   Acct# [id number]  02/05/21   10:59 CST  Referring Provider: Aime Rivera MD    Chief Complaint: Covid-19     Interval history: He was examined through the glass in the Covid unit to avoid unnecessary exposure to staff and preserve PPE.  He remains in the critical care portion of the Covid unit.  He has been alternating between BiPAP rate of 20, 16/10 and FiO2 of 100 and Vapotherm 40 L and FiO2 of 100.  He is currently sitting up in the chair eating breakfast with nursing at bedside.  He is wearing the Vapotherm with O2 sats in the mid to low 80s however waveform is not adequate.  Daughter is standing outside the glass wall with me.  Blood pressure is 135/70.  Nursing reports that he is not self proned at this time and they will encourage it sometime after his breakfast settles.  Attending has noted that he gave him 40 of Lasix yesterday with approximately 1600 output.  He is ordered another 40 of Lasix today.  He also reported that he started him on colchicine for the COVID-19 as well.  No fever. No other complaints.    Meds:  albuterol sulfate HFA, 2 puff, Inhalation, 4x Daily - RT  ALPRAZolam, 0.25 mg, Oral, TID  ascorbic acid, 500 mg, Oral, BID  aspirin, 81 mg, Oral, Daily  atorvastatin, 10 mg, Oral, Nightly  cholecalciferol, 1,000 Units, Oral, Daily  colchicine, 0.6 mg, Oral, Daily  dexamethasone, 6 mg, Oral, Q12H    Or  dexamethasone, 6 mg, Intravenous, Q12H  docusate sodium, 100 mg, Oral, Daily  enoxaparin, 40 mg, Subcutaneous, Q24H  famotidine, 20 mg, Oral, BID AC  hydroCHLOROthiazide, 25 mg, Oral, Daily  melatonin, 3 mg, Oral, Nightly  multivitamin and minerals, 15 mL, Oral, Daily  polyethylene glycol, 17 g, Oral, Daily  remdesivir, 100 mg, Intravenous, Q24H  sodium chloride, 10 mL, Intravenous, Q12H  verapamil SR, 180 mg, Oral, Q24H  zinc sulfate, 220 mg, Oral,  Daily      Pharmacy Consult - Remdesivir,       Review of Systems:   Review of Systems:    Constitutional: Positive for fatigue and fever.   HENT: Positive for congestion and postnasal drip.    Eyes: Negative.    Respiratory: Positive for cough, chest tightness and wheezing.    Cardiovascular: Negative for chest pain, palpitations and leg swelling.   Gastrointestinal: Negative.  Negative for diarrhea, nausea and vomiting.   Endocrine: Negative.    Genitourinary: Negative.    Musculoskeletal: Positive for arthralgias and back pain.   Skin: Negative.    Allergic/Immunologic: Positive for environmental allergies.   Neurological: Negative.    Hematological: Negative.    Psychiatric/Behavioral: The patient is nervous/anxious.      Physical Exam:  SpO2 Percentage    02/05/21 0915 02/05/21 1050 02/05/21 1053   SpO2: (!) 85% (!) 84% 91%     Temp:  [97.7 °F (36.5 °C)-98.2 °F (36.8 °C)] 97.7 °F (36.5 °C)  Heart Rate:  [55-84] 81  Resp:  [20-35] 35  BP: (120-154)/(64-84) 154/77    Intake/Output Summary (Last 24 hours) at 2/5/2021 1059  Last data filed at 2/5/2021 1000  Gross per 24 hour   Intake 1090 ml   Output 2850 ml   Net -1760 ml     GENERAL/CONSTITUTIONAL: No distress.   HEENT: atraumatic, normocephalic  NOSE: normal, high flow nasal cannula in place  NECK: jugular veins nondistended  CHEST: no paradox, no retractions.  No respiratory distress.   CARDIAC: Sinus rhythm  ABDOMEN: nondistended  : Deferred  EXTREMITIES: Mild lower extremity edema.  NEURO: Awake and alert, waving  SKIN: no jaundice.  No rash  Laboratory Data:  Results from last 7 days   Lab Units 02/05/21  0445 02/04/21  0411 02/03/21  0430   WBC 10*3/mm3 8.80 9.91 10.83*   HEMOGLOBIN g/dL 15.7 15.1 16.2   PLATELETS 10*3/mm3 256 230 233     Results from last 7 days   Lab Units 02/05/21  0445 02/04/21  0411 02/03/21  0430 02/02/21  0424   SODIUM mmol/L 138 137 136 137   POTASSIUM mmol/L 4.0 3.8 4.1 4.0   BUN mg/dL 35* 32* 29* 20   CREATININE mg/dL 0.71*  0.68* 0.84 0.79   CRP mg/dL  --  9.79*  --  29.57*   PROCALCITONIN ng/mL  --   --   --  0.24   FERRITIN ng/mL  --  648.80*  --  525.00*   D DIMER QUANT mg/L (FEU) 0.62*  --  0.81* 0.79*     Results from last 7 days   Lab Units 02/05/21  0455 02/04/21  0520 02/03/21  1605   PH, ARTERIAL pH units 7.438 7.444 7.458*   PCO2, ARTERIAL mm Hg 47.3* 44.8 43.5   PO2 ART mm Hg 70.3* 67.0* 84.0   FIO2 % 100 100 100     Blood Culture   Date Value Ref Range Status   02/02/2021 No growth at 2 days  Preliminary   02/02/2021 No growth at 2 days  Preliminary     Respiratory Culture   Date Value Ref Range Status   02/02/2021   Preliminary    Heavy growth (4+) Normal Respiratory Jazz: NO S.aureus/MRSA or Pseudomonas aeruginosa     Recent films:  No radiology results for the last day  Films reviewed personally by me.  My interpretation: None today, 2-5-21    Pulmonary Assessment:    1. Covid-19  2. Acute respiratory failure with hypoxia related to COVID-19  3. Bilateral lung infiltrates  4. History of colon cancer  5. Obese  6. Obstructive sleep apnea, does not use his CPAP    Recommend:   · Supplemental oxygen and titrate as tolerated for goal around 90% sat, currently on BiPAP 16/10 and FiO2 100 alternating with Vapotherm 40 L and 100 FiO2  · DVT prophylaxis, Lovenox, 40 daily  · Mobilize as tolerated  · Proning candidate: Yes if able to coordinate given continuous BiPAP need  · Dexamethasone day 4 of 10  · Remdesivir  · Colchicine started per attending  · Diuresis-40 mg IV Lasix x1 dose 2/4 and again 2/5 per attending  · HFA bronchodilators  · Continue zinc, melatonin, Vitamin C and D  · Incentive, flutter  · Nutrition  · Prognosis guarded  · CODE STATUS full however patient has made some statements that he may not want to be intubated.  He wanted to discuss this further with his wife.  He does not appear to need that at this point however a decision should be made soon as able given his significant BiPAP  requirements    Electronically signed by VALERIA Bernard, 02/05/21, 10:59 CST     ATTESTATION OF CLINICAL NOTE:  I have reviewed the notes, assessments, and/or procedures performed by  VALERIA Martinez, I concur with her/his documentation of Santiago Spence. He was seen in the follow-up visit in pulmonary rounds in intensive care unit today with Covid isolation from outside the glass door to reduce the risk of cross infection and exposure and to minimize the use of PPE.     He remains on BiPAP and Vapotherm dependent.    Although he has significant oxygen desaturation he appears comfortable.  He tried to do prone ventilation but due to BiPAP it is difficult..  He had been started on remdesivir, dexamethasone and also getting adjunct treatment with zinc melatonin vitamin C and vitamin D.  ID is following him.  He wanted to be full code but did not want to stay on the ventilator for long enough if he needs a trach or PEG placement.  He has no other acute events and respiratory status essentially unchanged.    On physical examination per nursing assessment patient is a elderly  male who looks anxious on BiPAP otherwise stable.  HEENT: Atraumatic normocephalic.  Neck: Supple no mass no jugular venous distention no lymphadenopathy.  Heart sounds rate and rhythm regular no murmur.  Lungs: Bibasilar crackles and diffuse wheezes.  Abdomen: Soft nondistended nontender bowel sounds present.  Extremities: No edema normal pulses normal color.  Neurologic: Grossly intact.  Skin: No breakdown.     Continue current treatment plan with oxygen via Vapotherm when off BiPAP and keep on BiPAP with adjusted rate and repeat blood gas tomorrow morning.  Continue bronchodilator treatment with albuterol HFA.  Continue DVT and ulcer prophylaxis and pain and anxiety control.  For COVID-19 pneumonia continue remdesivir, dexamethasone, zinc melatonin vitamin C, vitamin D.  ID is following.  Continue nutritional  support.  Routine respiratory care and encourage incentive spirometry and Aerobika flutter valve.  Continue nutritional support.  Physical activity as tolerated.  We will consider intubation mechanical ventilation if respiratory status worsens or if he does not improve.  Her inflammatory markers and repeat labs and imaging studies from time to time as needed. Continue current meds and supportive care.  Continue respiratory and contact isolation. Pulmonary team will continue following him and make further recommendations.  Overall prognosis: Guarded.    I have seen and examined patient personally, performing a face-to-face diagnostic evaluation with plan of care reviewed and developed with APRN and nursing staff. I have addended and/or modified the above history of present illness, physical examination, and assessment and plan to reflect my findings and impressions. Essential elements of the care plan were discussed with APRN above.  Agree with findings and assessment/plan as documented above.    Cora Ewing MD  Pulmonologist/Intensivist  2/5/2021 18:24 CST

## 2021-02-05 NOTE — PROGRESS NOTES
[facility] Medicine Services  INPATIENT PROGRESS NOTE    Length of Stay: 3  Date of Admission: 2/2/2021  Primary Care Physician: Meredith Llyod APRN    Subjective   Chief Complaint: Respiratory failure with hypoxia.  Covid pneumonia.    HPI   Patient is on Vapotherm.  Breathing is little bit better.  We will give another 40 of Lasix today.  Net urine output 1600.  Colchicine was started yesterday.    Review of Systems   Constitutional: Positive for activity change. Negative for appetite change, chills and fever.   HENT: Negative for hearing loss, nosebleeds, tinnitus and trouble swallowing.    Eyes: Negative for visual disturbance.   Respiratory: Positive for cough and shortness of breath. Negative for chest tightness and wheezing.    Cardiovascular: Negative for chest pain, palpitations and leg swelling.   Gastrointestinal: Negative for abdominal distention, abdominal pain, blood in stool, constipation, diarrhea, nausea and vomiting.   Endocrine: Negative for cold intolerance, heat intolerance, polydipsia, polyphagia and polyuria.   Genitourinary: Negative for decreased urine volume, difficulty urinating, dysuria, flank pain, frequency and hematuria.   Musculoskeletal: Negative for arthralgias, joint swelling and myalgias.   Skin: Negative for rash.   Allergic/Immunologic: Negative for immunocompromised state.   Neurological: Positive for weakness. Negative for dizziness, syncope, light-headedness and headaches.   Hematological: Negative for adenopathy. Does not bruise/bleed easily.   Psychiatric/Behavioral: Negative for confusion and sleep disturbance. The patient is not nervous/anxious.      All pertinent negatives and positives are as above. All other systems have been reviewed and are negative unless otherwise stated.     Objective    Temp:  [97.9 °F (36.6 °C)-98.2 °F (36.8 °C)] 97.9 °F (36.6 °C)  Heart Rate:  [55-84] 61  Resp:  [20-28] 22  BP: (120-151)/(64-84)  124/71    Intake/Output Summary (Last 24 hours) at 2/5/2021 0811  Last data filed at 2/5/2021 0444  Gross per 24 hour   Intake 850 ml   Output 2450 ml   Net -1600 ml     Physical Exam  Constitutional:       Appearance: He is well-developed.   HENT:      Head: Normocephalic.   Eyes:      General: No scleral icterus.     Pupils: Pupils are equal, round, and reactive to light.   Neck:      Musculoskeletal: Normal range of motion and neck supple.      Thyroid: No thyromegaly.      Vascular: No carotid bruit or JVD.      Trachea: No tracheal deviation.   Cardiovascular:      Rate and Rhythm: Normal rate and regular rhythm.      Heart sounds: No murmur. No friction rub. No gallop.    Pulmonary:      Effort: No respiratory distress.      Breath sounds: No wheezing or rales.      Comments: Decrease in breath sound bilateral, clear.  Patient is on BiPAP  Chest:      Chest wall: No tenderness.   Abdominal:      General: Bowel sounds are normal. There is no distension.      Palpations: Abdomen is soft.      Tenderness: There is no abdominal tenderness.      Comments: Obesity.   Musculoskeletal: Normal range of motion.   Skin:     General: Skin is warm and dry.      Capillary Refill: Capillary refill takes 2 to 3 seconds.      Findings: No rash.   Neurological:      General: No focal deficit present.      Mental Status: He is alert and oriented to person, place, and time.      Cranial Nerves: No cranial nerve deficit.      Motor: Weakness present.      Coordination: Coordination abnormal.   Psychiatric:         Behavior: Behavior normal.      Results Review:  Lab Results (last 24 hours)     Procedure Component Value Units Date/Time    Blood Culture - Blood, Arm, Right [[id number]] Collected: 02/02/21 0525    Specimen: Blood from Arm, Right Updated: 02/05/21 0645     Blood Culture No growth at 3 days    Blood Culture - Blood, Arm, Left [[id number]] Collected: 02/02/21 0525    Specimen: Blood from Arm, Left Updated: 02/05/21 0645      Blood Culture No growth at 3 days    Comprehensive Metabolic Panel [[id number]]  (Abnormal) Collected: 02/05/21 0445    Specimen: Blood Updated: 02/05/21 0539     Glucose 126 mg/dL      BUN 35 mg/dL      Creatinine 0.71 mg/dL      Sodium 138 mmol/L      Potassium 4.0 mmol/L      Chloride 98 mmol/L      CO2 30.0 mmol/L      Calcium 8.9 mg/dL      Total Protein 6.5 g/dL      Albumin 3.00 g/dL      ALT (SGPT) 16 U/L      AST (SGOT) 31 U/L      Alkaline Phosphatase 61 U/L      Total Bilirubin 0.4 mg/dL      eGFR Non African Amer 109 mL/min/1.73      Globulin 3.5 gm/dL      A/G Ratio 0.9 g/dL      BUN/Creatinine Ratio 49.3     Anion Gap 10.0 mmol/L     Narrative:      GFR Normal >60  Chronic Kidney Disease <60  Kidney Failure <15      Lactate Dehydrogenase [[id number]]  (Abnormal) Collected: 02/05/21 0445    Specimen: Blood Updated: 02/05/21 0539      U/L     D-dimer, Quantitative [[id number]]  (Abnormal) Collected: 02/05/21 0445    Specimen: Blood Updated: 02/05/21 0531     D-Dimer, Quantitative 0.62 mg/L (FEU)     Narrative:      Reference Range is 0-0.50 mg/L FEU. However, results <0.50 mg/L FEU tends to rule out DVT or PE. Results >0.50 mg/L FEU are not useful in predicting absence or presence of DVT or PE.      Fibrinogen [[id number]]  (Abnormal) Collected: 02/05/21 0445    Specimen: Blood Updated: 02/05/21 0531     Fibrinogen 837 mg/dL     CBC & Differential [[id number]]  (Abnormal) Collected: 02/05/21 0445    Specimen: Blood Updated: 02/05/21 0523    Narrative:      The following orders were created for panel order CBC & Differential.  Procedure                               Abnormality         Status                     ---------                               -----------         ------                     CBC Auto Differential[[id number]]        Abnormal            Final result                 Please view results for these tests on the individual orders.    CBC Auto Differential [[id number]]  (Abnormal)  Collected: 02/05/21 0445    Specimen: Blood Updated: 02/05/21 0523     WBC 8.80 10*3/mm3      RBC 5.03 10*6/mm3      Hemoglobin 15.7 g/dL      Hematocrit 44.9 %      MCV 89.3 fL      MCH 31.2 pg      MCHC 35.0 g/dL      RDW 13.7 %      RDW-SD 44.8 fl      MPV 10.0 fL      Platelets 256 10*3/mm3      Neutrophil % 86.6 %      Lymphocyte % 8.2 %      Monocyte % 4.1 %      Eosinophil % 0.0 %      Basophil % 0.3 %      Immature Grans % 0.8 %      Neutrophils, Absolute 7.62 10*3/mm3      Lymphocytes, Absolute 0.72 10*3/mm3      Monocytes, Absolute 0.36 10*3/mm3      Eosinophils, Absolute 0.00 10*3/mm3      Basophils, Absolute 0.03 10*3/mm3      Immature Grans, Absolute 0.07 10*3/mm3      nRBC 0.0 /100 WBC     Blood Gas, Arterial - [[id number]]  (Abnormal) Collected: 02/05/21 0455    Specimen: Arterial Blood Updated: 02/05/21 0502     Site Left Radial     Jose's Test N/A     pH, Arterial 7.438 pH units      pCO2, Arterial 47.3 mm Hg      Comment: 83 Value above reference range        pO2, Arterial 70.3 mm Hg      Comment: 84 Value below reference range        HCO3, Arterial 32.0 mmol/L      Comment: 83 Value above reference range        Base Excess, Arterial 6.4 mmol/L      Comment: 83 Value above reference range        O2 Saturation, Arterial 94.2 %      Temperature 37.0 C      Barometric Pressure for Blood Gas 748 mmHg      Modality BiPap     FIO2 100 %      Ventilator Mode NA     Set Mech Resp Rate 20.0     IPAP 16     Comment: Meter: [id number]     :  [id number]        EPAP 10     Collected by [id number]     pCO2, Temperature Corrected 47.3 mm Hg      pH, Temp Corrected 7.438 pH Units      pO2, Temperature Corrected 70.3 mm Hg     Respiratory Culture - Sputum, Cough [[id number]] Collected: 02/02/21 0357    Specimen: Sputum from Cough Updated: 02/04/21 1020     Respiratory Culture Heavy growth (4+) Normal Respiratory Jazz: NO S.aureus/MRSA or Pseudomonas aeruginosa     Gram Stain Less than 25 WBCs per low power  field      Few (2+) Epithelial cells per low power field      Few (2+) Mixed gram positive cb      Few (2+) Gram negative bacilli           Cultures:  Blood Culture   Date Value Ref Range Status   02/02/2021 No growth at 3 days  Preliminary   02/02/2021 No growth at 3 days  Preliminary     Respiratory Culture   Date Value Ref Range Status   02/02/2021   Final    Heavy growth (4+) Normal Respiratory Cb: NO S.aureus/MRSA or Pseudomonas aeruginosa       Radiology Data:    Imaging Results (Last 24 Hours)     ** No results found for the last 24 hours. **          No Known Allergies    Scheduled meds:   albuterol sulfate HFA, 2 puff, Inhalation, 4x Daily - RT  ALPRAZolam, 0.25 mg, Oral, TID  ascorbic acid, 500 mg, Oral, BID  aspirin, 81 mg, Oral, Daily  atorvastatin, 10 mg, Oral, Nightly  cholecalciferol, 1,000 Units, Oral, Daily  colchicine, 0.6 mg, Oral, Daily  dexamethasone, 6 mg, Oral, Q12H    Or  dexamethasone, 6 mg, Intravenous, Q12H  docusate sodium, 100 mg, Oral, Daily  enoxaparin, 40 mg, Subcutaneous, Q24H  famotidine, 20 mg, Oral, BID AC  hydroCHLOROthiazide, 25 mg, Oral, Daily  melatonin, 3 mg, Oral, Nightly  multivitamin and minerals, 15 mL, Oral, Daily  polyethylene glycol, 17 g, Oral, Daily  remdesivir, 100 mg, Intravenous, Q24H  sodium chloride, 10 mL, Intravenous, Q12H  verapamil SR, 180 mg, Oral, Q24H  zinc sulfate, 220 mg, Oral, Daily        PRN meds:  •  acetaminophen **OR** acetaminophen  •  benzonatate  •  dextromethorphan polistirex ER  •  labetalol  •  ondansetron **OR** ondansetron  •  Pharmacy Consult - Remdesivir  •  sodium chloride    Assessment/Plan       Essential hypertension    Pneumonia due to COVID-19 virus    Acute respiratory failure with hypoxia (CMS/HCC)      Plan:  COVID-19 pneumonia/respiratory failure with hypoxia.  Previously was on antibiotics and Decadron outpatient.  Zinc.  Vitamin C.  Vitamin D.  Melatonin at night. Continue Decadron.  Continue remdesivir.   S/P Convalescent plasma x1.  Tessalon Perle.  Delsym.  Consult pulmonary.  Consult infectious disease.  Cont colchicine.  CT scan and x-ray at [facility] -bilateral infiltrate consistent with Covid.  D-dimer was elevated, no PE per CTA.  Chest x-ray-Bilateral mid to lower lung zone opacities in the background of cardiomegaly- consider fluid overload- Superimposed infection is not excluded.  Vapotherm 40/100.  BiPAP at night.    Hypertension/diastolic heart failure.  Continue verapamil.  Continue hydrochlorthiazide.  Continue Lipitor.  Labetalol as needed.  Lasix 40 iv x 1 today.   Echocardiogram-ejection fraction 56 to 60%, diastolic dysfunction grade 1, left ventricle cavity moderately dilated, moderate concentric hypertrophy, mild aortic valve stenosis.     Reflux.  Pepcid.  Zofran as needed.     Obesity.  BMI is 38.     History colon cancer.     Lovenox prophylaxis.     Nutrition.  Regular/cardiac diet.     Blood cultures-no growth 3 days.  Legionella antigen-negative.  Respiratory culture- normal resp cb.  Strep pneumo-negative.  Mycoplasma-pending.     Discharge Planning: 3 to 6 days.    Electronically signed by Aime Rivera MD, 02/05/21, 8:11 AM CST.

## 2021-02-06 LAB
ALBUMIN SERPL-MCNC: 3.1 G/DL (ref 3.5–5.2)
ALBUMIN/GLOB SERPL: 0.9 G/DL
ALP SERPL-CCNC: 59 U/L (ref 39–117)
ALT SERPL W P-5'-P-CCNC: 19 U/L (ref 1–41)
ANION GAP SERPL CALCULATED.3IONS-SCNC: 8 MMOL/L (ref 5–15)
ARTERIAL PATENCY WRIST A: POSITIVE
AST SERPL-CCNC: 29 U/L (ref 1–40)
ATMOSPHERIC PRESS: 751 MMHG
BASE EXCESS BLDA CALC-SCNC: 8 MMOL/L (ref 0–2)
BASOPHILS # BLD AUTO: 0.02 10*3/MM3 (ref 0–0.2)
BASOPHILS NFR BLD AUTO: 0.1 % (ref 0–1.5)
BDY SITE: ABNORMAL
BILIRUB SERPL-MCNC: 0.5 MG/DL (ref 0–1.2)
BODY TEMPERATURE: 37 C
BUN SERPL-MCNC: 40 MG/DL (ref 8–23)
BUN/CREAT SERPL: 45.5 (ref 7–25)
CALCIUM SPEC-SCNC: 8.7 MG/DL (ref 8.6–10.5)
CHLORIDE SERPL-SCNC: 97 MMOL/L (ref 98–107)
CO2 SERPL-SCNC: 34 MMOL/L (ref 22–29)
CREAT SERPL-MCNC: 0.88 MG/DL (ref 0.76–1.27)
CRP SERPL-MCNC: 2.45 MG/DL (ref 0–0.5)
DEPRECATED RDW RBC AUTO: 44.5 FL (ref 37–54)
EOSINOPHIL # BLD AUTO: 0 10*3/MM3 (ref 0–0.4)
EOSINOPHIL NFR BLD AUTO: 0 % (ref 0.3–6.2)
EPAP: 10
ERYTHROCYTE [DISTWIDTH] IN BLOOD BY AUTOMATED COUNT: 13.3 % (ref 12.3–15.4)
FERRITIN SERPL-MCNC: 506.3 NG/ML (ref 30–400)
GFR SERPL CREATININE-BSD FRML MDRD: 85 ML/MIN/1.73
GLOBULIN UR ELPH-MCNC: 3.3 GM/DL
GLUCOSE SERPL-MCNC: 121 MG/DL (ref 65–99)
HCO3 BLDA-SCNC: 33.4 MMOL/L (ref 20–26)
HCT VFR BLD AUTO: 46.4 % (ref 37.5–51)
HGB BLD-MCNC: 16.2 G/DL (ref 13–17.7)
IMM GRANULOCYTES # BLD AUTO: 0.14 10*3/MM3 (ref 0–0.05)
IMM GRANULOCYTES NFR BLD AUTO: 1 % (ref 0–0.5)
INHALED O2 CONCENTRATION: 90 %
IPAP: 16
LYMPHOCYTES # BLD AUTO: 0.86 10*3/MM3 (ref 0.7–3.1)
LYMPHOCYTES NFR BLD AUTO: 5.9 % (ref 19.6–45.3)
Lab: ABNORMAL
MCH RBC QN AUTO: 31.5 PG (ref 26.6–33)
MCHC RBC AUTO-ENTMCNC: 34.9 G/DL (ref 31.5–35.7)
MCV RBC AUTO: 90.1 FL (ref 79–97)
MODALITY: ABNORMAL
MONOCYTES # BLD AUTO: 0.38 10*3/MM3 (ref 0.1–0.9)
MONOCYTES NFR BLD AUTO: 2.6 % (ref 5–12)
NEUTROPHILS NFR BLD AUTO: 13.06 10*3/MM3 (ref 1.7–7)
NEUTROPHILS NFR BLD AUTO: 90.4 % (ref 42.7–76)
NRBC BLD AUTO-RTO: 0 /100 WBC (ref 0–0.2)
PCO2 BLDA: 47.8 MM HG (ref 35–45)
PCO2 TEMP ADJ BLD: 47.8 MM HG (ref 35–45)
PH BLDA: 7.45 PH UNITS (ref 7.35–7.45)
PH, TEMP CORRECTED: 7.45 PH UNITS (ref 7.35–7.45)
PLATELET # BLD AUTO: 295 10*3/MM3 (ref 140–450)
PMV BLD AUTO: 9.9 FL (ref 6–12)
PO2 BLDA: 98.8 MM HG (ref 83–108)
PO2 TEMP ADJ BLD: 98.8 MM HG (ref 83–108)
POTASSIUM SERPL-SCNC: 4.1 MMOL/L (ref 3.5–5.2)
PROT SERPL-MCNC: 6.4 G/DL (ref 6–8.5)
RBC # BLD AUTO: 5.15 10*6/MM3 (ref 4.14–5.8)
SAO2 % BLDCOA: 97.7 % (ref 94–99)
SET MECH RESP RATE: 20
SODIUM SERPL-SCNC: 139 MMOL/L (ref 136–145)
VENTILATOR MODE: ABNORMAL
WBC # BLD AUTO: 14.46 10*3/MM3 (ref 3.4–10.8)

## 2021-02-06 PROCEDURE — 94799 UNLISTED PULMONARY SVC/PX: CPT

## 2021-02-06 PROCEDURE — 82803 BLOOD GASES ANY COMBINATION: CPT

## 2021-02-06 PROCEDURE — 86140 C-REACTIVE PROTEIN: CPT | Performed by: FAMILY MEDICINE

## 2021-02-06 PROCEDURE — 25010000002 MEROPENEM PER 100 MG: Performed by: INTERNAL MEDICINE

## 2021-02-06 PROCEDURE — 63710000001 DEXAMETHASONE PER 0.25 MG: Performed by: INTERNAL MEDICINE

## 2021-02-06 PROCEDURE — 80053 COMPREHEN METABOLIC PANEL: CPT | Performed by: INTERNAL MEDICINE

## 2021-02-06 PROCEDURE — 85025 COMPLETE CBC W/AUTO DIFF WBC: CPT | Performed by: INTERNAL MEDICINE

## 2021-02-06 PROCEDURE — 36600 WITHDRAWAL OF ARTERIAL BLOOD: CPT

## 2021-02-06 PROCEDURE — 94660 CPAP INITIATION&MGMT: CPT

## 2021-02-06 PROCEDURE — 25010000002 ENOXAPARIN PER 10 MG: Performed by: INTERNAL MEDICINE

## 2021-02-06 PROCEDURE — 99233 SBSQ HOSP IP/OBS HIGH 50: CPT | Performed by: INTERNAL MEDICINE

## 2021-02-06 PROCEDURE — 82728 ASSAY OF FERRITIN: CPT | Performed by: FAMILY MEDICINE

## 2021-02-06 RX ORDER — AMLODIPINE BESYLATE 5 MG/1
5 TABLET ORAL
Status: DISCONTINUED | OUTPATIENT
Start: 2021-02-06 | End: 2021-02-16 | Stop reason: HOSPADM

## 2021-02-06 RX ADMIN — OXYCODONE HYDROCHLORIDE AND ACETAMINOPHEN 500 MG: 500 TABLET ORAL at 08:03

## 2021-02-06 RX ADMIN — ASPIRIN 81 MG: 81 TABLET, CHEWABLE ORAL at 08:03

## 2021-02-06 RX ADMIN — DOCUSATE SODIUM 100 MG: 100 CAPSULE ORAL at 08:03

## 2021-02-06 RX ADMIN — ALPRAZOLAM 0.25 MG: 0.25 TABLET ORAL at 08:03

## 2021-02-06 RX ADMIN — HYDROCHLOROTHIAZIDE 25 MG: 25 TABLET ORAL at 08:03

## 2021-02-06 RX ADMIN — Medication 15 ML: at 08:04

## 2021-02-06 RX ADMIN — OXYCODONE HYDROCHLORIDE AND ACETAMINOPHEN 500 MG: 500 TABLET ORAL at 20:16

## 2021-02-06 RX ADMIN — DEXAMETHASONE 6 MG: 4 TABLET ORAL at 08:03

## 2021-02-06 RX ADMIN — MEROPENEM 1 G: 1 INJECTION, POWDER, FOR SOLUTION INTRAVENOUS at 16:58

## 2021-02-06 RX ADMIN — FAMOTIDINE 20 MG: 20 TABLET, FILM COATED ORAL at 08:03

## 2021-02-06 RX ADMIN — MEROPENEM 1 G: 1 INJECTION, POWDER, FOR SOLUTION INTRAVENOUS at 08:04

## 2021-02-06 RX ADMIN — VERAPAMIL HYDROCHLORIDE 180 MG: 180 TABLET, FILM COATED, EXTENDED RELEASE ORAL at 08:03

## 2021-02-06 RX ADMIN — FAMOTIDINE 20 MG: 20 TABLET, FILM COATED ORAL at 16:58

## 2021-02-06 RX ADMIN — MEROPENEM 1 G: 1 INJECTION, POWDER, FOR SOLUTION INTRAVENOUS at 02:00

## 2021-02-06 RX ADMIN — COLCHICINE 0.6 MG: 0.6 TABLET, FILM COATED ORAL at 08:03

## 2021-02-06 RX ADMIN — ALBUTEROL SULFATE 2 PUFF: 90 AEROSOL, METERED RESPIRATORY (INHALATION) at 10:28

## 2021-02-06 RX ADMIN — POLYETHYLENE GLYCOL (3350) 17 G: 17 POWDER, FOR SOLUTION ORAL at 08:03

## 2021-02-06 RX ADMIN — ALPRAZOLAM 0.25 MG: 0.25 TABLET ORAL at 16:58

## 2021-02-06 RX ADMIN — ALBUTEROL SULFATE 2 PUFF: 90 AEROSOL, METERED RESPIRATORY (INHALATION) at 07:00

## 2021-02-06 RX ADMIN — Medication 1000 UNITS: at 08:03

## 2021-02-06 RX ADMIN — AMLODIPINE BESYLATE 5 MG: 5 TABLET ORAL at 16:58

## 2021-02-06 RX ADMIN — ATORVASTATIN CALCIUM 10 MG: 10 TABLET, FILM COATED ORAL at 20:16

## 2021-02-06 RX ADMIN — DEXAMETHASONE 6 MG: 4 TABLET ORAL at 20:16

## 2021-02-06 RX ADMIN — SODIUM CHLORIDE, PRESERVATIVE FREE 10 ML: 5 INJECTION INTRAVENOUS at 20:20

## 2021-02-06 RX ADMIN — REMDESIVIR 100 MG: 100 INJECTION, POWDER, LYOPHILIZED, FOR SOLUTION INTRAVENOUS at 04:45

## 2021-02-06 RX ADMIN — ZINC SULFATE 220 MG (50 MG) CAPSULE 220 MG: CAPSULE at 08:03

## 2021-02-06 RX ADMIN — Medication 3 MG: at 20:16

## 2021-02-06 RX ADMIN — ALBUTEROL SULFATE 2 PUFF: 90 AEROSOL, METERED RESPIRATORY (INHALATION) at 20:09

## 2021-02-06 RX ADMIN — ENOXAPARIN SODIUM 40 MG: 40 INJECTION SUBCUTANEOUS at 08:02

## 2021-02-06 RX ADMIN — ALPRAZOLAM 0.25 MG: 0.25 TABLET ORAL at 20:16

## 2021-02-06 RX ADMIN — ALBUTEROL SULFATE 2 PUFF: 90 AEROSOL, METERED RESPIRATORY (INHALATION) at 14:36

## 2021-02-06 NOTE — PROGRESS NOTES
Infectious Diseases Progress Note    Patient:  Santiago Spence  YOB: 1950  MRN: [id number]   Admit date: 2/2/2021   Admitting Physician: Aime Rivera MD  Primary Care Physician: Meredith Lloyd APRN    Chief Complaint/Interval History: He is up to chair.  He seems to be doing better.  He feels better.  Sputum production diminished in comparison with yesterday.  He has not had fever.  He had been on Vapotherm at 40 L and 100%.  He is now down to 30 to 35 L.  He received a little bit of benefit with proning yesterday.  He clearly looks stronger and looks as if he feels better than he did yesterday.  He feels he started to turn the corner sometime yesterday.  We had increased his dose of steroids 48 hours ago.  Antibiotics were added yesterday.  Remains in ICU.  He was very relieved to get news that his wife is doing okay with her Covid infection at home.    Intake/Output Summary (Last 24 hours) at 2/6/2021 1612  Last data filed at 2/6/2021 1147  Gross per 24 hour   Intake 570 ml   Output 1580 ml   Net -1010 ml     Allergies: No Known Allergies  Current Scheduled Medications:   albuterol sulfate HFA, 2 puff, Inhalation, 4x Daily - RT  ALPRAZolam, 0.25 mg, Oral, TID  ascorbic acid, 500 mg, Oral, BID  aspirin, 81 mg, Oral, Daily  atorvastatin, 10 mg, Oral, Nightly  cholecalciferol, 1,000 Units, Oral, Daily  colchicine, 0.6 mg, Oral, Daily  dexamethasone, 6 mg, Oral, Q12H    Or  dexamethasone, 6 mg, Intravenous, Q12H  docusate sodium, 100 mg, Oral, Daily  enoxaparin, 40 mg, Subcutaneous, Q24H  famotidine, 20 mg, Oral, BID AC  hydroCHLOROthiazide, 25 mg, Oral, Daily  melatonin, 3 mg, Oral, Nightly  meropenem, 1 g, Intravenous, Q8H  multivitamin and minerals, 15 mL, Oral, Daily  polyethylene glycol, 17 g, Oral, Daily  sodium chloride, 10 mL, Intravenous, Q12H  verapamil SR, 180 mg, Oral, Q24H  zinc sulfate, 220 mg, Oral, Daily      Current PRN Medications:  •  acetaminophen **OR** acetaminophen  •   "benzonatate  •  dextromethorphan polistirex ER  •  labetalol  •  ondansetron **OR** ondansetron  •  Pharmacy Consult - Remdesivir  •  sodium chloride    Review of Systems no nausea, diarrhea, rash, skin itching.    Vital Signs:  /76   Pulse 70   Temp 98.4 °F (36.9 °C)   Resp 20   Ht 162.6 cm (64\")   Wt 100 kg (221 lb 5.5 oz)   SpO2 95%   BMI 37.99 kg/m²     Physical Exam  Vital signs - reviewed.  Line/IV site - No erythema, warmth, induration, or tenderness.  Lungs without wheezing  Extremities without significant edema  Abdomen soft and nondistended    Lab Results:  CBC:   Results from last 7 days   Lab Units 02/06/21 0456 02/05/21 0445 02/04/21 0411 02/03/21 0430 02/02/21  0424   WBC 10*3/mm3 14.46* 8.80 9.91 10.83* 8.07   HEMOGLOBIN g/dL 16.2 15.7 15.1 16.2 16.1   HEMATOCRIT % 46.4 44.9 43.5 47.9 48.8   PLATELETS 10*3/mm3 295 256 230 233 199     BMP:  Results from last 7 days   Lab Units 02/06/21 0456 02/05/21 0445 02/04/21 0411 02/03/21 0430 02/02/21  0424 02/01/21  1757   SODIUM mmol/L 139 138 137 136 137  --  137   POTASSIUM mmol/L 4.1 4.0 3.8 4.1 4.0  --  3.5   CHLORIDE mmol/L 97* 98 100 97* 97*  --  98   TOTAL CO2 mmol/L  --   --   --   --   --   --  30   CO2 mmol/L 34.0* 30.0* 28.0 28.0 31.0*  --   --    BUN mg/dL 40* 35* 32* 29* 20  --  19*   CREATININE mg/dL 0.88 0.71* 0.68* 0.84 0.79  --  0.85   GLUCOSE mg/dL 121* 126* 113* 115* 121*   < >  --    CALCIUM mg/dL 8.7 8.9 8.6 8.8 8.9  --  8.6   ALT (SGPT) U/L 19 16 18 20 23  --  31    < > = values in this interval not displayed.     Culture Results:   Blood Culture   Date Value Ref Range Status   02/02/2021 No growth at 4 days  Preliminary   02/02/2021 No growth at 4 days  Preliminary     Respiratory Culture   Date Value Ref Range Status   02/05/2021   Preliminary    Light growth (2+) Normal Respiratory Jazz: NO S.aureus/MRSA or Pseudomonas aeruginosa   02/02/2021   Final    Heavy growth (4+) Normal Respiratory Jazz: NO S.aureus/MRSA " or Pseudomonas aeruginosa     Radiology: None  Additional Studies Reviewed: None    Impression:   COVID-19 infection- Respiratory failure secondary to COVID-19-I get the sense he seems to be showing some improvement.  He clearly looks stronger.  Cough less productive than yesterday.    Recommendations:   Continue higher dose steroids  Continue continue meropenem  Encourage incentive spirometry  Slowly wean oxygen as tolerated  Await respiratory cultures  Continue to follow    Ronni Mattson MD

## 2021-02-06 NOTE — PROGRESS NOTES
[facility] Medicine Services  INPATIENT PROGRESS NOTE    Length of Stay: 4  Date of Admission: 2/2/2021  Primary Care Physician: Meredith Lloyd APRN    Subjective   Chief Complaint: Respiratory failure with hypoxia.  Covid pneumonia.    HPI   Patient is maxed on Vapotherm 40/100.  70% with BiPAP.  Patient stated he is breathing little better.  Good urine output with 40 of Lasix yesterday.  Meropenem was started by infectious disease.    Review of Systems   Constitutional: Positive for activity change. Negative for appetite change, chills and fever.   HENT: Negative for hearing loss, nosebleeds, tinnitus and trouble swallowing.    Eyes: Negative for visual disturbance.   Respiratory: Positive for cough and shortness of breath. Negative for chest tightness and wheezing.    Cardiovascular: Negative for chest pain, palpitations and leg swelling.   Gastrointestinal: Negative for abdominal distention, abdominal pain, blood in stool, constipation, diarrhea, nausea and vomiting.   Endocrine: Negative for cold intolerance, heat intolerance, polydipsia, polyphagia and polyuria.   Genitourinary: Negative for decreased urine volume, difficulty urinating, dysuria, flank pain, frequency and hematuria.   Musculoskeletal: Negative for arthralgias, joint swelling and myalgias.   Skin: Negative for rash.   Allergic/Immunologic: Negative for immunocompromised state.   Neurological: Positive for weakness. Negative for dizziness, syncope, light-headedness and headaches.   Hematological: Negative for adenopathy. Does not bruise/bleed easily.   Psychiatric/Behavioral: Negative for confusion and sleep disturbance. The patient is not nervous/anxious.         All pertinent negatives and positives are as above. All other systems have been reviewed and are negative unless otherwise stated.     Objective    Temp:  [97.4 °F (36.3 °C)-98 °F (36.7 °C)] 97.5 °F (36.4 °C)  Heart Rate:  [53-81] 61  Resp:   [20-35] 22  BP: (102-154)/(50-82) 133/62    Intake/Output Summary (Last 24 hours) at 2/6/2021 0814  Last data filed at 2/6/2021 0500  Gross per 24 hour   Intake 1290 ml   Output 2600 ml   Net -1310 ml     Physical Exam  Constitutional:       Appearance: He is well-developed.   HENT:      Head: Normocephalic.   Eyes:      General: No scleral icterus.     Pupils: Pupils are equal, round, and reactive to light.   Neck:      Musculoskeletal: Normal range of motion and neck supple.      Thyroid: No thyromegaly.      Vascular: No carotid bruit or JVD.      Trachea: No tracheal deviation.   Cardiovascular:      Rate and Rhythm: Normal rate and regular rhythm.      Heart sounds: No murmur. No friction rub. No gallop.    Pulmonary:      Effort: No respiratory distress.      Breath sounds: No wheezing or rales.      Comments: Decrease in breath sound bilateral, clear.  Patient is on BiPAP  Chest:      Chest wall: No tenderness.   Abdominal:      General: Bowel sounds are normal. There is no distension.      Palpations: Abdomen is soft.      Tenderness: There is no abdominal tenderness.      Comments: Obesity.   Musculoskeletal: Normal range of motion.   Skin:     General: Skin is warm and dry.      Capillary Refill: Capillary refill takes 2 to 3 seconds.      Findings: No rash.   Neurological:      General: No focal deficit present.      Mental Status: He is alert and oriented to person, place, and time.      Cranial Nerves: No cranial nerve deficit.      Motor: Weakness present.      Coordination: Coordination abnormal.   Psychiatric:         Behavior: Behavior normal.   Results Review:  Lab Results (last 24 hours)     Procedure Component Value Units Date/Time    Blood Culture - Blood, Arm, Left [[id number]] Collected: 02/02/21 0525    Specimen: Blood from Arm, Left Updated: 02/06/21 0645     Blood Culture No growth at 4 days    Blood Culture - Blood, Arm, Right [[id number]] Collected: 02/02/21 0525    Specimen: Blood from  Arm, Right Updated: 02/06/21 0645     Blood Culture No growth at 4 days    Ferritin [[id number]]  (Abnormal) Collected: 02/06/21 0456    Specimen: Blood Updated: 02/06/21 0547     Ferritin 506.30 ng/mL     Narrative:      Results may be falsely decreased if patient taking Biotin.      Comprehensive Metabolic Panel [[id number]]  (Abnormal) Collected: 02/06/21 0456    Specimen: Blood Updated: 02/06/21 0543     Glucose 121 mg/dL      BUN 40 mg/dL      Creatinine 0.88 mg/dL      Sodium 139 mmol/L      Potassium 4.1 mmol/L      Chloride 97 mmol/L      CO2 34.0 mmol/L      Calcium 8.7 mg/dL      Total Protein 6.4 g/dL      Albumin 3.10 g/dL      ALT (SGPT) 19 U/L      AST (SGOT) 29 U/L      Alkaline Phosphatase 59 U/L      Total Bilirubin 0.5 mg/dL      eGFR Non African Amer 85 mL/min/1.73      Globulin 3.3 gm/dL      A/G Ratio 0.9 g/dL      BUN/Creatinine Ratio 45.5     Anion Gap 8.0 mmol/L     Narrative:      GFR Normal >60  Chronic Kidney Disease <60  Kidney Failure <15      C-reactive Protein [[id number]]  (Abnormal) Collected: 02/06/21 0456    Specimen: Blood Updated: 02/06/21 0543     C-Reactive Protein 2.45 mg/dL     CBC & Differential [[id number]]  (Abnormal) Collected: 02/06/21 0456    Specimen: Blood Updated: 02/06/21 0536    Narrative:      The following orders were created for panel order CBC & Differential.  Procedure                               Abnormality         Status                     ---------                               -----------         ------                     CBC Auto Differential[[id number]]        Abnormal            Final result                 Please view results for these tests on the individual orders.    CBC Auto Differential [[id number]]  (Abnormal) Collected: 02/06/21 0456    Specimen: Blood Updated: 02/06/21 0536     WBC 14.46 10*3/mm3      RBC 5.15 10*6/mm3      Hemoglobin 16.2 g/dL      Hematocrit 46.4 %      MCV 90.1 fL      MCH 31.5 pg      MCHC 34.9 g/dL      RDW 13.3 %       RDW-SD 44.5 fl      MPV 9.9 fL      Platelets 295 10*3/mm3      Neutrophil % 90.4 %      Lymphocyte % 5.9 %      Monocyte % 2.6 %      Eosinophil % 0.0 %      Basophil % 0.1 %      Immature Grans % 1.0 %      Neutrophils, Absolute 13.06 10*3/mm3      Lymphocytes, Absolute 0.86 10*3/mm3      Monocytes, Absolute 0.38 10*3/mm3      Eosinophils, Absolute 0.00 10*3/mm3      Basophils, Absolute 0.02 10*3/mm3      Immature Grans, Absolute 0.14 10*3/mm3      nRBC 0.0 /100 WBC     Blood Gas, Arterial - [[id number]]  (Abnormal) Collected: 02/06/21 0325    Specimen: Arterial Blood Updated: 02/06/21 0332     Site Right Radial     Jose's Test Positive     pH, Arterial 7.453 pH units      Comment: 83 Value above reference range        pCO2, Arterial 47.8 mm Hg      Comment: 83 Value above reference range        pO2, Arterial 98.8 mm Hg      HCO3, Arterial 33.4 mmol/L      Comment: 83 Value above reference range        Base Excess, Arterial 8.0 mmol/L      Comment: 83 Value above reference range        O2 Saturation, Arterial 97.7 %      Temperature 37.0 C      Barometric Pressure for Blood Gas 751 mmHg      Modality BiPap     FIO2 90 %      Ventilator Mode NA     Set Mech Resp Rate 20.0     IPAP 16     Comment: Meter: [id number]     :  [id number]        EPAP 10     Collected by [id number]     pCO2, Temperature Corrected 47.8 mm Hg      pH, Temp Corrected 7.453 pH Units      pO2, Temperature Corrected 98.8 mm Hg     Respiratory Culture - Sputum, Cough [[id number]] Collected: 02/05/21 1711    Specimen: Sputum from Cough Updated: 02/05/21 2257     Gram Stain Greater than 25 WBCs per low power field      Rare (1+) Epithelial cells seen      Many (4+) Mixed gram positive cb    MRSA Screen, PCR (Inpatient) - Swab, Nares [[id number]]  (Normal) Collected: 02/05/21 1818    Specimen: Swab from Nares Updated: 02/05/21 1952     MRSA PCR No MRSA Detected           Cultures:  Blood Culture   Date Value Ref Range Status   02/02/2021 No  growth at 4 days  Preliminary   02/02/2021 No growth at 4 days  Preliminary     Respiratory Culture   Date Value Ref Range Status   02/02/2021   Final    Heavy growth (4+) Normal Respiratory Jazz: NO S.aureus/MRSA or Pseudomonas aeruginosa       Radiology Data:    Imaging Results (Last 24 Hours)     ** No results found for the last 24 hours. **          No Known Allergies    Scheduled meds:   albuterol sulfate HFA, 2 puff, Inhalation, 4x Daily - RT  ALPRAZolam, 0.25 mg, Oral, TID  ascorbic acid, 500 mg, Oral, BID  aspirin, 81 mg, Oral, Daily  atorvastatin, 10 mg, Oral, Nightly  cholecalciferol, 1,000 Units, Oral, Daily  colchicine, 0.6 mg, Oral, Daily  dexamethasone, 6 mg, Oral, Q12H    Or  dexamethasone, 6 mg, Intravenous, Q12H  docusate sodium, 100 mg, Oral, Daily  enoxaparin, 40 mg, Subcutaneous, Q24H  famotidine, 20 mg, Oral, BID AC  hydroCHLOROthiazide, 25 mg, Oral, Daily  melatonin, 3 mg, Oral, Nightly  meropenem, 1 g, Intravenous, Q8H  multivitamin and minerals, 15 mL, Oral, Daily  polyethylene glycol, 17 g, Oral, Daily  sodium chloride, 10 mL, Intravenous, Q12H  verapamil SR, 180 mg, Oral, Q24H  zinc sulfate, 220 mg, Oral, Daily        PRN meds:  •  acetaminophen **OR** acetaminophen  •  benzonatate  •  dextromethorphan polistirex ER  •  labetalol  •  ondansetron **OR** ondansetron  •  Pharmacy Consult - Remdesivir  •  sodium chloride    Assessment/Plan       Essential hypertension    Pneumonia due to COVID-19 virus    Acute respiratory failure with hypoxia (CMS/HCC)      Plan:  COVID-19 pneumonia/respiratory failure with hypoxia.  Previously was on antibiotics and Decadron outpatient.  Zinc.  Vitamin C.  Vitamin D.  Melatonin at night. Continue Decadron.  Continue remdesivir.  S/P Convalescent plasma x1.  Tessalon Perle.  Delsym.  Consult pulmonary.  Consult infectious disease.  Cont colchicine.  Meropenem by infectious disease.  CT scan and x-ray at [facility] -bilateral infiltrate  consistent with Covid.  D-dimer was elevated, no PE per CTA.  Chest x-ray-Bilateral mid to lower lung zone opacities in the background of cardiomegaly- consider fluid overload- Superimposed infection is not excluded.  Vapotherm 40/100.  BiPAP 70%.     Hypertension/diastolic heart failure.  Continue verapamil.  Continue hydrochlorthiazide.  Continue Lipitor.  Labetalol as needed.    Echocardiogram-ejection fraction 56 to 60%, diastolic dysfunction grade 1, left ventricle cavity moderately dilated, moderate concentric hypertrophy, mild aortic valve stenosis.     Reflux.  Pepcid.  Zofran as needed.     Obesity.  BMI is 38.     History colon cancer.     Lovenox prophylaxis.     Nutrition.  Regular/cardiac diet.     Blood cultures-no growth 4 days.  Legionella antigen-negative.  Respiratory culture- normal resp cb.  Strep pneumo-negative.  Mycoplasma-pending.     Discharge Planning: 3 to 6 days.    Electronically signed by Aime Rivera MD, 02/06/21, 8:14 AM CST.

## 2021-02-06 NOTE — PROGRESS NOTES
PULMONARY AND CRITICAL CARE PROGRESS NOTE - [facility]    Patient: Santiago Spence  1950   MR# [id number]   Acct# [id number]  02/06/21   16:25 CST  Referring Provider: Aime Rivera MD    Chief Complaint: Covid-19 pneumonia, acute respiratory failure, obstructive sleep apnea, obesity,    Interval history:   Patient was seen in the intensive care unit in Covid isolation from outside the glass door to reduce the risk of cross infection exposure and to minimize use of PPE.  He is doing overall well and still requiring 40 L flow and 100% FiO2 on Vapotherm and is doing BiPAP on and off.  His respiratory status is stable and he is currently getting treatment for COVID-19 with days IV, dexamethasone zinc and melatonin and also getting meropenem for secondary bacterial infection.  ID is following.  He has no other acute events overnight and apparently shortness of breath is better than before.      Meds:  albuterol sulfate HFA, 2 puff, Inhalation, 4x Daily - RT  ALPRAZolam, 0.25 mg, Oral, TID  ascorbic acid, 500 mg, Oral, BID  aspirin, 81 mg, Oral, Daily  atorvastatin, 10 mg, Oral, Nightly  cholecalciferol, 1,000 Units, Oral, Daily  colchicine, 0.6 mg, Oral, Daily  dexamethasone, 6 mg, Oral, Q12H    Or  dexamethasone, 6 mg, Intravenous, Q12H  docusate sodium, 100 mg, Oral, Daily  enoxaparin, 40 mg, Subcutaneous, Q24H  famotidine, 20 mg, Oral, BID AC  hydroCHLOROthiazide, 25 mg, Oral, Daily  melatonin, 3 mg, Oral, Nightly  meropenem, 1 g, Intravenous, Q8H  multivitamin and minerals, 15 mL, Oral, Daily  polyethylene glycol, 17 g, Oral, Daily  sodium chloride, 10 mL, Intravenous, Q12H  verapamil SR, 180 mg, Oral, Q24H  zinc sulfate, 220 mg, Oral, Daily      Pharmacy Consult - Remdesivir,       Review of Systems:   Review of Systems:    Constitutional: Positive for fatigue and fever.  Improved.  HENT: Positive for congestion and postnasal drip.    Eyes: Negative.    Respiratory: Positive for cough, chest  tightness and wheezing.    Improved.  Cardiovascular: Negative for chest pain, palpitations and leg swelling.   Gastrointestinal: Negative.  Negative for diarrhea, nausea and vomiting.   Endocrine: Negative.    Genitourinary: Negative.    Musculoskeletal: Positive for arthralgias and back pain.   Skin: Negative.    Allergic/Immunologic: Positive for environmental allergies.   Neurological: Negative.    Hematological: Negative.    Psychiatric/Behavioral: The patient is nervous/anxious.      Physical Exam:  SpO2 Percentage    02/06/21 1436 02/06/21 1500 02/06/21 1600   SpO2: 95% (!) 89% 92%     Temp:  [97.5 °F (36.4 °C)-98.4 °F (36.9 °C)] 98.2 °F (36.8 °C)  Heart Rate:  [53-83] 79  Resp:  [18-26] 20  BP: (102-176)/() 170/82    Intake/Output Summary (Last 24 hours) at 2/6/2021 1625  Last data filed at 2/6/2021 1147  Gross per 24 hour   Intake 570 ml   Output 1580 ml   Net -1010 ml     Based on nursing assessment.    GENERAL/CONSTITUTIONAL: No distress.   HEENT: atraumatic, normocephalic  NOSE: normal, high flow nasal cannula in place  NECK: jugular veins nondistended  CHEST: no paradox, no retractions.  No respiratory distress.   CARDIAC: Sinus rhythm  ABDOMEN: nondistended  : Deferred  EXTREMITIES: Mild lower extremity edema.  NEURO: Awake and alert, waving  SKIN: no jaundice.  No rash    Laboratory Data:  Results from last 7 days   Lab Units 02/06/21  0456 02/05/21 0445 02/04/21  0411   WBC 10*3/mm3 14.46* 8.80 9.91   HEMOGLOBIN g/dL 16.2 15.7 15.1   PLATELETS 10*3/mm3 295 256 230     Results from last 7 days   Lab Units 02/06/21  0456 02/05/21  0445 02/04/21  0411 02/03/21  0430 02/02/21  0424   SODIUM mmol/L 139 138 137 136 137   POTASSIUM mmol/L 4.1 4.0 3.8 4.1 4.0   BUN mg/dL 40* 35* 32* 29* 20   CREATININE mg/dL 0.88 0.71* 0.68* 0.84 0.79   CRP mg/dL 2.45*  --  9.79*  --  29.57*   PROCALCITONIN ng/mL  --   --   --   --  0.24   FERRITIN ng/mL 506.30*  --  648.80*  --  525.00*   D DIMER QUANT mg/L (FEU)   --  0.62*  --  0.81* 0.79*     Results from last 7 days   Lab Units 02/06/21  0325 02/05/21  0455 02/04/21  0520   PH, ARTERIAL pH units 7.453* 7.438 7.444   PCO2, ARTERIAL mm Hg 47.8* 47.3* 44.8   PO2 ART mm Hg 98.8 70.3* 67.0*   FIO2 % 90 100 100     Blood Culture   Date Value Ref Range Status   02/02/2021 No growth at 2 days  Preliminary   02/02/2021 No growth at 2 days  Preliminary     Respiratory Culture   Date Value Ref Range Status   02/02/2021   Preliminary    Heavy growth (4+) Normal Respiratory Jazz: NO S.aureus/MRSA or Pseudomonas aeruginosa     Recent films:  No radiology results for the last day  Films reviewed personally by me.  My interpretation: None today, 2-5-21    Pulmonary Assessment:    1. Covid-19  2. Acute respiratory failure with hypoxia related to COVID-19  3. Bilateral lung infiltrates  4. History of colon cancer  5. Obese  6. Obstructive sleep apnea, does not use his CPAP    Recommend:   · Continue on BiPAP and Vapotherm as needed.  Continue supplemental oxygen and titrate keep oxygen saturation more than 92%.  · He is currently on BiPAP 16/10 and FiO2 100 alternating with Vapotherm 40 L and 100 FiO2  · DVT prophylaxis, Lovenox, 40 daily  · Mobilize as tolerated  · Proning candidate: Yes if able to coordinate given continuous BiPAP need  · He is trying to lie on his side and doing some self proning according to the RN.  · Dexamethasone day 4 of 10  · Remdesivir dose per pharmacy.  · Continue dexamethasone, melatonin and zinc and other adjunct treatment for COVID-19.  · Colchicine started per attending  · Diuresis-40 mg IV Lasix x1 dose 2/4 and again 2/5 per attending  · HFA bronchodilators  · Continue incentive spirometry, flutter valve.  · Nutritional support with oral diet  · Consider intubation and mechanical ventilation if condition worsens.  · Prognosis guarded.CODE STATUS full   · Repeat labs and imaging studies from time to time.  · Continue respiratory and contact  isolation  · Pulmonary team will continue following and make further recommendation  ·   Electronically signed by     Cora Ewing MD  Pulmonologist/Intensivist  2/6/2021 16:25 CST

## 2021-02-07 LAB
ALBUMIN SERPL-MCNC: 3.4 G/DL (ref 3.5–5.2)
ALBUMIN/GLOB SERPL: 1 G/DL
ALP SERPL-CCNC: 66 U/L (ref 39–117)
ALT SERPL W P-5'-P-CCNC: 21 U/L (ref 1–41)
ANION GAP SERPL CALCULATED.3IONS-SCNC: 9 MMOL/L (ref 5–15)
AST SERPL-CCNC: 28 U/L (ref 1–40)
BACTERIA SPEC AEROBE CULT: NORMAL
BACTERIA SPEC AEROBE CULT: NORMAL
BACTERIA SPEC RESP CULT: NORMAL
BASOPHILS # BLD AUTO: 0.02 10*3/MM3 (ref 0–0.2)
BASOPHILS NFR BLD AUTO: 0.1 % (ref 0–1.5)
BILIRUB SERPL-MCNC: 0.6 MG/DL (ref 0–1.2)
BUN SERPL-MCNC: 31 MG/DL (ref 8–23)
BUN/CREAT SERPL: 43.1 (ref 7–25)
CALCIUM SPEC-SCNC: 8.7 MG/DL (ref 8.6–10.5)
CHLORIDE SERPL-SCNC: 99 MMOL/L (ref 98–107)
CO2 SERPL-SCNC: 30 MMOL/L (ref 22–29)
CREAT SERPL-MCNC: 0.72 MG/DL (ref 0.76–1.27)
D DIMER PPP FEU-MCNC: 0.77 MG/L (FEU) (ref 0–0.5)
DEPRECATED RDW RBC AUTO: 43.2 FL (ref 37–54)
EOSINOPHIL # BLD AUTO: 0 10*3/MM3 (ref 0–0.4)
EOSINOPHIL NFR BLD AUTO: 0 % (ref 0.3–6.2)
ERYTHROCYTE [DISTWIDTH] IN BLOOD BY AUTOMATED COUNT: 13.4 % (ref 12.3–15.4)
FIBRINOGEN PPP-MCNC: 659 MG/DL (ref 240–460)
GFR SERPL CREATININE-BSD FRML MDRD: 107 ML/MIN/1.73
GLOBULIN UR ELPH-MCNC: 3.5 GM/DL
GLUCOSE SERPL-MCNC: 125 MG/DL (ref 65–99)
GRAM STN SPEC: NORMAL
HCT VFR BLD AUTO: 47.2 % (ref 37.5–51)
HGB BLD-MCNC: 16.6 G/DL (ref 13–17.7)
IMM GRANULOCYTES # BLD AUTO: 0.15 10*3/MM3 (ref 0–0.05)
IMM GRANULOCYTES NFR BLD AUTO: 0.9 % (ref 0–0.5)
LDH SERPL-CCNC: 777 U/L (ref 135–225)
LYMPHOCYTES # BLD AUTO: 0.63 10*3/MM3 (ref 0.7–3.1)
LYMPHOCYTES NFR BLD AUTO: 3.8 % (ref 19.6–45.3)
M PNEUMO DNA SPEC QL NAA+PROBE: NEGATIVE
MCH RBC QN AUTO: 31 PG (ref 26.6–33)
MCHC RBC AUTO-ENTMCNC: 35.2 G/DL (ref 31.5–35.7)
MCV RBC AUTO: 88.2 FL (ref 79–97)
MONOCYTES # BLD AUTO: 0.58 10*3/MM3 (ref 0.1–0.9)
MONOCYTES NFR BLD AUTO: 3.5 % (ref 5–12)
NEUTROPHILS NFR BLD AUTO: 15.09 10*3/MM3 (ref 1.7–7)
NEUTROPHILS NFR BLD AUTO: 91.7 % (ref 42.7–76)
NRBC BLD AUTO-RTO: 0 /100 WBC (ref 0–0.2)
PLATELET # BLD AUTO: 355 10*3/MM3 (ref 140–450)
PMV BLD AUTO: 10 FL (ref 6–12)
POTASSIUM SERPL-SCNC: 4.2 MMOL/L (ref 3.5–5.2)
PROT SERPL-MCNC: 6.9 G/DL (ref 6–8.5)
RBC # BLD AUTO: 5.35 10*6/MM3 (ref 4.14–5.8)
SODIUM SERPL-SCNC: 138 MMOL/L (ref 136–145)
WBC # BLD AUTO: 16.47 10*3/MM3 (ref 3.4–10.8)

## 2021-02-07 PROCEDURE — 25010000002 ENOXAPARIN PER 10 MG: Performed by: INTERNAL MEDICINE

## 2021-02-07 PROCEDURE — 85384 FIBRINOGEN ACTIVITY: CPT | Performed by: INTERNAL MEDICINE

## 2021-02-07 PROCEDURE — 99232 SBSQ HOSP IP/OBS MODERATE 35: CPT | Performed by: INTERNAL MEDICINE

## 2021-02-07 PROCEDURE — 83615 LACTATE (LD) (LDH) ENZYME: CPT | Performed by: INTERNAL MEDICINE

## 2021-02-07 PROCEDURE — 94660 CPAP INITIATION&MGMT: CPT

## 2021-02-07 PROCEDURE — 94799 UNLISTED PULMONARY SVC/PX: CPT

## 2021-02-07 PROCEDURE — 25010000002 MEROPENEM PER 100 MG: Performed by: INTERNAL MEDICINE

## 2021-02-07 PROCEDURE — 80053 COMPREHEN METABOLIC PANEL: CPT | Performed by: INTERNAL MEDICINE

## 2021-02-07 PROCEDURE — 63710000001 DEXAMETHASONE PER 0.25 MG: Performed by: INTERNAL MEDICINE

## 2021-02-07 PROCEDURE — 85379 FIBRIN DEGRADATION QUANT: CPT | Performed by: INTERNAL MEDICINE

## 2021-02-07 PROCEDURE — 85025 COMPLETE CBC W/AUTO DIFF WBC: CPT | Performed by: INTERNAL MEDICINE

## 2021-02-07 RX ADMIN — ENOXAPARIN SODIUM 40 MG: 40 INJECTION SUBCUTANEOUS at 08:16

## 2021-02-07 RX ADMIN — Medication 3 MG: at 22:24

## 2021-02-07 RX ADMIN — ALBUTEROL SULFATE 2 PUFF: 90 AEROSOL, METERED RESPIRATORY (INHALATION) at 15:53

## 2021-02-07 RX ADMIN — FAMOTIDINE 20 MG: 20 TABLET, FILM COATED ORAL at 08:16

## 2021-02-07 RX ADMIN — ZINC SULFATE 220 MG (50 MG) CAPSULE 220 MG: CAPSULE at 08:16

## 2021-02-07 RX ADMIN — ALPRAZOLAM 0.25 MG: 0.25 TABLET ORAL at 08:16

## 2021-02-07 RX ADMIN — FAMOTIDINE 20 MG: 20 TABLET, FILM COATED ORAL at 18:25

## 2021-02-07 RX ADMIN — DEXAMETHASONE 6 MG: 4 TABLET ORAL at 08:16

## 2021-02-07 RX ADMIN — AMLODIPINE BESYLATE 5 MG: 5 TABLET ORAL at 08:16

## 2021-02-07 RX ADMIN — Medication 1000 UNITS: at 08:16

## 2021-02-07 RX ADMIN — DOCUSATE SODIUM 100 MG: 100 CAPSULE ORAL at 08:16

## 2021-02-07 RX ADMIN — ALBUTEROL SULFATE 2 PUFF: 90 AEROSOL, METERED RESPIRATORY (INHALATION) at 19:13

## 2021-02-07 RX ADMIN — ATORVASTATIN CALCIUM 10 MG: 10 TABLET, FILM COATED ORAL at 22:24

## 2021-02-07 RX ADMIN — HYDROCHLOROTHIAZIDE 25 MG: 25 TABLET ORAL at 08:16

## 2021-02-07 RX ADMIN — MEROPENEM 1 G: 1 INJECTION, POWDER, FOR SOLUTION INTRAVENOUS at 18:25

## 2021-02-07 RX ADMIN — COLCHICINE 0.6 MG: 0.6 TABLET, FILM COATED ORAL at 08:16

## 2021-02-07 RX ADMIN — MEROPENEM 1 G: 1 INJECTION, POWDER, FOR SOLUTION INTRAVENOUS at 08:16

## 2021-02-07 RX ADMIN — ASPIRIN 81 MG: 81 TABLET, CHEWABLE ORAL at 08:16

## 2021-02-07 RX ADMIN — OXYCODONE HYDROCHLORIDE AND ACETAMINOPHEN 500 MG: 500 TABLET ORAL at 08:16

## 2021-02-07 RX ADMIN — ALPRAZOLAM 0.25 MG: 0.25 TABLET ORAL at 18:25

## 2021-02-07 RX ADMIN — ALBUTEROL SULFATE 2 PUFF: 90 AEROSOL, METERED RESPIRATORY (INHALATION) at 06:42

## 2021-02-07 RX ADMIN — VERAPAMIL HYDROCHLORIDE 180 MG: 180 TABLET, FILM COATED, EXTENDED RELEASE ORAL at 08:16

## 2021-02-07 RX ADMIN — Medication 15 ML: at 08:16

## 2021-02-07 RX ADMIN — ALBUTEROL SULFATE 2 PUFF: 90 AEROSOL, METERED RESPIRATORY (INHALATION) at 10:27

## 2021-02-07 RX ADMIN — POLYETHYLENE GLYCOL (3350) 17 G: 17 POWDER, FOR SOLUTION ORAL at 08:16

## 2021-02-07 RX ADMIN — DEXAMETHASONE 6 MG: 4 TABLET ORAL at 22:24

## 2021-02-07 RX ADMIN — MEROPENEM 1 G: 1 INJECTION, POWDER, FOR SOLUTION INTRAVENOUS at 00:45

## 2021-02-07 RX ADMIN — SODIUM CHLORIDE, PRESERVATIVE FREE 10 ML: 5 INJECTION INTRAVENOUS at 22:59

## 2021-02-07 RX ADMIN — OXYCODONE HYDROCHLORIDE AND ACETAMINOPHEN 500 MG: 500 TABLET ORAL at 22:24

## 2021-02-07 RX ADMIN — ALPRAZOLAM 0.25 MG: 0.25 TABLET ORAL at 22:24

## 2021-02-07 NOTE — PROGRESS NOTES
[facility] Medicine Services  INPATIENT PROGRESS NOTE    Length of Stay: 5  Date of Admission: 2/2/2021  Primary Care Physician: Meredith Lloyd APRN    Subjective   Chief Complaint: Respiratory failure with hypoxia.  Covid pneumonia.    HPI   Patient is currently on BiPAP at 70%.  Patient stated he is feeling better.  Patient eating all his meals.    Review of Systems   Constitutional: Positive for activity change. Negative for appetite change, chills and fever.   HENT: Negative for hearing loss, nosebleeds, tinnitus and trouble swallowing.    Eyes: Negative for visual disturbance.   Respiratory: Positive for cough and shortness of breath. Negative for chest tightness and wheezing.    Cardiovascular: Negative for chest pain, palpitations and leg swelling.   Gastrointestinal: Negative for abdominal distention, abdominal pain, blood in stool, constipation, diarrhea, nausea and vomiting.   Endocrine: Negative for cold intolerance, heat intolerance, polydipsia, polyphagia and polyuria.   Genitourinary: Negative for decreased urine volume, difficulty urinating, dysuria, flank pain, frequency and hematuria.   Musculoskeletal: Negative for arthralgias, joint swelling and myalgias.   Skin: Negative for rash.   Allergic/Immunologic: Negative for immunocompromised state.   Neurological: Positive for weakness. Negative for dizziness, syncope, light-headedness and headaches.   Hematological: Negative for adenopathy. Does not bruise/bleed easily.   Psychiatric/Behavioral: Negative for confusion and sleep disturbance. The patient is not nervous/anxious.      All pertinent negatives and positives are as above. All other systems have been reviewed and are negative unless otherwise stated.     Objective    Temp:  [96.5 °F (35.8 °C)-98.4 °F (36.9 °C)] 97 °F (36.1 °C)  Heart Rate:  [58-92] 61  Resp:  [18-28] 28  BP: (109-176)/() 128/72    Intake/Output Summary (Last 24 hours) at  2/7/2021 0749  Last data filed at 2/7/2021 0600  Gross per 24 hour   Intake 640.81 ml   Output 1305 ml   Net -664.19 ml     Physical Exam  Constitutional:       Appearance: He is well-developed.   HENT:      Head: Normocephalic.   Eyes:      General: No scleral icterus.     Pupils: Pupils are equal, round, and reactive to light.   Neck:      Musculoskeletal: Normal range of motion and neck supple.      Thyroid: No thyromegaly.      Vascular: No carotid bruit or JVD.      Trachea: No tracheal deviation.   Cardiovascular:      Rate and Rhythm: Normal rate and regular rhythm.      Heart sounds: No murmur. No friction rub. No gallop.    Pulmonary:      Effort: No respiratory distress.      Breath sounds: No wheezing or rales.      Comments: Decrease in breath sound bilateral, clear.  Patient is on BiPAP  Chest:      Chest wall: No tenderness.   Abdominal:      General: Bowel sounds are normal. There is no distension.      Palpations: Abdomen is soft.      Tenderness: There is no abdominal tenderness.      Comments: Obesity.   Musculoskeletal: Normal range of motion.   Skin:     General: Skin is warm and dry.      Capillary Refill: Capillary refill takes 2 to 3 seconds.      Findings: No rash.   Neurological:      General: No focal deficit present.      Mental Status: He is alert and oriented to person, place, and time.      Cranial Nerves: No cranial nerve deficit.      Motor: Weakness present.      Coordination: Coordination abnormal.   Psychiatric:         Behavior: Behavior normal.   Results Review:  Lab Results (last 24 hours)     Procedure Component Value Units Date/Time    Comprehensive Metabolic Panel [[id number]]  (Abnormal) Collected: 02/07/21 0629    Specimen: Blood Updated: 02/07/21 0708     Glucose 125 mg/dL      BUN 31 mg/dL      Creatinine 0.72 mg/dL      Sodium 138 mmol/L      Potassium 4.2 mmol/L      Chloride 99 mmol/L      CO2 30.0 mmol/L      Calcium 8.7 mg/dL      Total Protein 6.9 g/dL      Albumin  3.40 g/dL      ALT (SGPT) 21 U/L      AST (SGOT) 28 U/L      Alkaline Phosphatase 66 U/L      Total Bilirubin 0.6 mg/dL      eGFR Non African Amer 107 mL/min/1.73      Globulin 3.5 gm/dL      A/G Ratio 1.0 g/dL      BUN/Creatinine Ratio 43.1     Anion Gap 9.0 mmol/L     Narrative:      GFR Normal >60  Chronic Kidney Disease <60  Kidney Failure <15      Lactate Dehydrogenase [[id number]]  (Abnormal) Collected: 02/07/21 0629    Specimen: Blood Updated: 02/07/21 0708      U/L     D-dimer, Quantitative [[id number]]  (Abnormal) Collected: 02/07/21 0629    Specimen: Blood Updated: 02/07/21 0702     D-Dimer, Quantitative 0.77 mg/L (FEU)     Narrative:      Reference Range is 0-0.50 mg/L FEU. However, results <0.50 mg/L FEU tends to rule out DVT or PE. Results >0.50 mg/L FEU are not useful in predicting absence or presence of DVT or PE.      Fibrinogen [[id number]]  (Abnormal) Collected: 02/07/21 0629    Specimen: Blood Updated: 02/07/21 0702     Fibrinogen 659 mg/dL     CBC & Differential [[id number]]  (Abnormal) Collected: 02/07/21 0629    Specimen: Blood Updated: 02/07/21 0652    Narrative:      The following orders were created for panel order CBC & Differential.  Procedure                               Abnormality         Status                     ---------                               -----------         ------                     CBC Auto Differential[[id number]]        Abnormal            Final result                 Please view results for these tests on the individual orders.    CBC Auto Differential [[id number]]  (Abnormal) Collected: 02/07/21 0629    Specimen: Blood Updated: 02/07/21 0652     WBC 16.47 10*3/mm3      RBC 5.35 10*6/mm3      Hemoglobin 16.6 g/dL      Hematocrit 47.2 %      MCV 88.2 fL      MCH 31.0 pg      MCHC 35.2 g/dL      RDW 13.4 %      RDW-SD 43.2 fl      MPV 10.0 fL      Platelets 355 10*3/mm3      Neutrophil % 91.7 %      Lymphocyte % 3.8 %      Monocyte % 3.5 %      Eosinophil % 0.0 %       Basophil % 0.1 %      Immature Grans % 0.9 %      Neutrophils, Absolute 15.09 10*3/mm3      Lymphocytes, Absolute 0.63 10*3/mm3      Monocytes, Absolute 0.58 10*3/mm3      Eosinophils, Absolute 0.00 10*3/mm3      Basophils, Absolute 0.02 10*3/mm3      Immature Grans, Absolute 0.15 10*3/mm3      nRBC 0.0 /100 WBC     Blood Culture - Blood, Arm, Right [[id number]] Collected: 02/02/21 0525    Specimen: Blood from Arm, Right Updated: 02/07/21 0645     Blood Culture No growth at 5 days    Blood Culture - Blood, Arm, Left [[id number]] Collected: 02/02/21 0525    Specimen: Blood from Arm, Left Updated: 02/07/21 0645     Blood Culture No growth at 5 days    Respiratory Culture - Sputum, Cough [[id number]] Collected: 02/05/21 1711    Specimen: Sputum from Cough Updated: 02/06/21 1202     Respiratory Culture Light growth (2+) Normal Respiratory Cb: NO S.aureus/MRSA or Pseudomonas aeruginosa     Gram Stain Greater than 25 WBCs per low power field      Rare (1+) Epithelial cells seen      Many (4+) Mixed gram positive cb           Cultures:  Blood Culture   Date Value Ref Range Status   02/02/2021 No growth at 5 days  Final   02/02/2021 No growth at 5 days  Final     Respiratory Culture   Date Value Ref Range Status   02/05/2021   Preliminary    Light growth (2+) Normal Respiratory Cb: NO S.aureus/MRSA or Pseudomonas aeruginosa   02/02/2021   Final    Heavy growth (4+) Normal Respiratory Cb: NO S.aureus/MRSA or Pseudomonas aeruginosa       Radiology Data:    Imaging Results (Last 24 Hours)     ** No results found for the last 24 hours. **          No Known Allergies    Scheduled meds:   albuterol sulfate HFA, 2 puff, Inhalation, 4x Daily - RT  ALPRAZolam, 0.25 mg, Oral, TID  amLODIPine, 5 mg, Oral, Q24H  ascorbic acid, 500 mg, Oral, BID  aspirin, 81 mg, Oral, Daily  atorvastatin, 10 mg, Oral, Nightly  cholecalciferol, 1,000 Units, Oral, Daily  colchicine, 0.6 mg, Oral, Daily  dexamethasone, 6 mg, Oral, Q12H     Or  dexamethasone, 6 mg, Intravenous, Q12H  docusate sodium, 100 mg, Oral, Daily  enoxaparin, 40 mg, Subcutaneous, Q24H  famotidine, 20 mg, Oral, BID AC  hydroCHLOROthiazide, 25 mg, Oral, Daily  melatonin, 3 mg, Oral, Nightly  meropenem, 1 g, Intravenous, Q8H  multivitamin and minerals, 15 mL, Oral, Daily  polyethylene glycol, 17 g, Oral, Daily  sodium chloride, 10 mL, Intravenous, Q12H  verapamil SR, 180 mg, Oral, Q24H  zinc sulfate, 220 mg, Oral, Daily        PRN meds:  •  acetaminophen **OR** acetaminophen  •  benzonatate  •  dextromethorphan polistirex ER  •  labetalol  •  ondansetron **OR** ondansetron  •  sodium chloride    Assessment/Plan       Essential hypertension    Pneumonia due to COVID-19 virus    Acute respiratory failure with hypoxia (CMS/HCC)      Plan:  COVID-19 pneumonia/respiratory failure with hypoxia.  Previously was on antibiotics and Decadron outpatient.  Zinc.  Vitamin C.  Vitamin D.  Melatonin at night. Continue Decadron.  Continue remdesivir.  S/P Convalescent plasma x1.  Teshernando Jordan.  Delsym.  Consult pulmonary.  Consult infectious disease.  Cont colchicine.  Meropenem by infectious disease.  CT scan and x-ray at [facility] -bilateral infiltrate consistent with Covid.  D-dimer was elevated, no PE per CTA.  Chest x-ray-Bilateral mid to lower lung zone opacities in the background of cardiomegaly- consider fluid overload- Superimposed infection is not excluded.  Vapotherm 40/100.  BiPAP 70%.     Hypertension/diastolic heart failure.  Continue verapamil.  Continue hydrochlorthiazide.  Continue Lipitor.  Labetalol as needed.    Echocardiogram-ejection fraction 56 to 60%, diastolic dysfunction grade 1, left ventricle cavity moderately dilated, moderate concentric hypertrophy, mild aortic valve stenosis.     Reflux.  Pepcid.  Zofran as needed.     Obesity.  BMI is 38.     History colon cancer.     Lovenox prophylaxis.     Nutrition.  Regular/cardiac diet.     Blood  cultures-no growth 5 days.  Legionella antigen-negative.  Respiratory culture- normal resp cb.  Strep pneumo-negative.  Mycoplasma-pending.     Discharge Planning: 3 to 6 days.    Electronically signed by Aime Rivera MD, 02/07/21, 7:49 AM CST.

## 2021-02-07 NOTE — PLAN OF CARE
Goal Outcome Evaluation:  Plan of Care Reviewed With: patient  Progress: improving  Outcome Summary: Patient tolerated sleeping on his belly with Bipap at 70%. Sats in the mid 90's. Afebrile. Blood pressure was 110s-120's. HR in the 60's. Patient is continuing to show progress.

## 2021-02-07 NOTE — PROGRESS NOTES
Infectious Diseases Progress Note    Patient:  Santiago Spence  YOB: 1950  MRN: [id number]   Admit date: 2/2/2021   Admitting Physician: Aime Rivera MD  Primary Care Physician: Meredith Lloyd APRN    Chief Complaint/Interval History: He remains on Vapotherm.  He is on 35 L and 100%.  Clinically he is feeling better.  He feels less dyspnea with exertion.  He feels more comfortable at rest.  He does not report significant productive cough at present.  Seems to be improved from a few days ago.  No GI symptoms.  Remains in ICU.  Indicates he is eating all his meals.  He has good appetite.  He remains in ICU.    Intake/Output Summary (Last 24 hours) at 2/7/2021 0826  Last data filed at 2/7/2021 0600  Gross per 24 hour   Intake 640.81 ml   Output 1125 ml   Net -484.19 ml     Allergies: No Known Allergies  Current Scheduled Medications:   albuterol sulfate HFA, 2 puff, Inhalation, 4x Daily - RT  ALPRAZolam, 0.25 mg, Oral, TID  amLODIPine, 5 mg, Oral, Q24H  ascorbic acid, 500 mg, Oral, BID  aspirin, 81 mg, Oral, Daily  atorvastatin, 10 mg, Oral, Nightly  cholecalciferol, 1,000 Units, Oral, Daily  colchicine, 0.6 mg, Oral, Daily  dexamethasone, 6 mg, Oral, Q12H    Or  dexamethasone, 6 mg, Intravenous, Q12H  docusate sodium, 100 mg, Oral, Daily  enoxaparin, 40 mg, Subcutaneous, Q24H  famotidine, 20 mg, Oral, BID AC  hydroCHLOROthiazide, 25 mg, Oral, Daily  melatonin, 3 mg, Oral, Nightly  meropenem, 1 g, Intravenous, Q8H  multivitamin and minerals, 15 mL, Oral, Daily  polyethylene glycol, 17 g, Oral, Daily  sodium chloride, 10 mL, Intravenous, Q12H  verapamil SR, 180 mg, Oral, Q24H  zinc sulfate, 220 mg, Oral, Daily      Current PRN Medications:  •  acetaminophen **OR** acetaminophen  •  benzonatate  •  dextromethorphan polistirex ER  •  labetalol  •  ondansetron **OR** ondansetron  •  sodium chloride    Review of Systems see HPI    Vital Signs:  /72   Pulse 61   Temp 97 °F (36.1 °C) (Oral)    "Resp 28   Ht 162.6 cm (64\")   Wt 102 kg (224 lb)   SpO2 96%   BMI 38.45 kg/m²     Physical Exam  Vital signs - reviewed.  Line/IV site - No erythema, warmth, induration, or tenderness.  Lungs clear to auscultation without crackles or wheezes  Extremities without significant edema  IV site without erythema    Lab Results:  CBC:   Results from last 7 days   Lab Units 02/07/21  0629 02/06/21 0456 02/05/21 0445 02/04/21 0411 02/03/21 0430 02/02/21 0424   WBC 10*3/mm3 16.47* 14.46* 8.80 9.91 10.83* 8.07   HEMOGLOBIN g/dL 16.6 16.2 15.7 15.1 16.2 16.1   HEMATOCRIT % 47.2 46.4 44.9 43.5 47.9 48.8   PLATELETS 10*3/mm3 355 295 256 230 233 199     BMP:  Results from last 7 days   Lab Units 02/07/21 0629 02/06/21 0456 02/05/21 0445 02/04/21 0411 02/03/21  0430 02/02/21  0424 02/01/21  1757   SODIUM mmol/L 138 139 138 137 136 137  --  137   POTASSIUM mmol/L 4.2 4.1 4.0 3.8 4.1 4.0  --  3.5   CHLORIDE mmol/L 99 97* 98 100 97* 97*  --  98   TOTAL CO2 mmol/L  --   --   --   --   --   --   --  30   CO2 mmol/L 30.0* 34.0* 30.0* 28.0 28.0 31.0*  --   --    BUN mg/dL 31* 40* 35* 32* 29* 20  --  19*   CREATININE mg/dL 0.72* 0.88 0.71* 0.68* 0.84 0.79  --  0.85   GLUCOSE mg/dL 125* 121* 126* 113* 115* 121*   < >  --    CALCIUM mg/dL 8.7 8.7 8.9 8.6 8.8 8.9  --  8.6   ALT (SGPT) U/L 21 19 16 18 20 23  --  31    < > = values in this interval not displayed.     Culture Results:     MRSA screen negative    Blood Culture   Date Value Ref Range Status   02/02/2021 No growth at 5 days  Final   02/02/2021 No growth at 5 days  Final     Respiratory Culture   Date Value Ref Range Status   02/05/2021   Preliminary    Light growth (2+) Normal Respiratory Jazz: NO S.aureus/MRSA or Pseudomonas aeruginosa   02/02/2021   Final    Heavy growth (4+) Normal Respiratory Jazz: NO S.aureus/MRSA or Pseudomonas aeruginosa     Respiratory culture collected February 5, 2021:  Respiratory Culture   Lab   Light growth (2+) Normal Respiratory Jazz: " NO S.aureus/MRSA or Pseudomonas aeruginosa   [facility]             Gram Stain   Lab   Greater than 25 WBCs per low power field  [facility]      Rare (1+) Epithelial cells seen  [facility]      Many (4+) Mixed gram positive cb  [facility]           Radiology: None    Additional Studies Reviewed: None    Impression:   COVID-19 infection  Respiratory failure secondary to COVID-19  Diastolic dysfunction  Hypertension    Recommendations:   Completed remdesivir  Continue twice daily dexamethasone  Continue empiric antibiotic therapy  Encouraging incentive spirometry  Clinically he definitely seems to feel as if he is improving.  Continue supportive care  Continue to follow    Ronni Mattson MD

## 2021-02-08 ENCOUNTER — APPOINTMENT (OUTPATIENT)
Dept: GENERAL RADIOLOGY | Facility: HOSPITAL | Age: 71
End: 2021-02-08

## 2021-02-08 PROBLEM — E66.9 OBESITY (BMI 30-39.9): Status: ACTIVE | Noted: 2021-02-08

## 2021-02-08 LAB
ALBUMIN SERPL-MCNC: 2.8 G/DL (ref 3.5–5.2)
ALBUMIN/GLOB SERPL: 1 G/DL
ALP SERPL-CCNC: 56 U/L (ref 39–117)
ALT SERPL W P-5'-P-CCNC: 20 U/L (ref 1–41)
ANION GAP SERPL CALCULATED.3IONS-SCNC: 3 MMOL/L (ref 5–15)
AST SERPL-CCNC: 23 U/L (ref 1–40)
BASOPHILS # BLD AUTO: 0.01 10*3/MM3 (ref 0–0.2)
BASOPHILS NFR BLD AUTO: 0.1 % (ref 0–1.5)
BILIRUB SERPL-MCNC: 0.5 MG/DL (ref 0–1.2)
BUN SERPL-MCNC: 26 MG/DL (ref 8–23)
BUN/CREAT SERPL: 40.6 (ref 7–25)
CALCIUM SPEC-SCNC: 8.2 MG/DL (ref 8.6–10.5)
CHLORIDE SERPL-SCNC: 100 MMOL/L (ref 98–107)
CO2 SERPL-SCNC: 30 MMOL/L (ref 22–29)
CREAT SERPL-MCNC: 0.64 MG/DL (ref 0.76–1.27)
CRP SERPL-MCNC: 0.57 MG/DL (ref 0–0.5)
DEPRECATED RDW RBC AUTO: 42.7 FL (ref 37–54)
EOSINOPHIL # BLD AUTO: 0 10*3/MM3 (ref 0–0.4)
EOSINOPHIL NFR BLD AUTO: 0 % (ref 0.3–6.2)
ERYTHROCYTE [DISTWIDTH] IN BLOOD BY AUTOMATED COUNT: 13.2 % (ref 12.3–15.4)
FERRITIN SERPL-MCNC: 490.9 NG/ML (ref 30–400)
GFR SERPL CREATININE-BSD FRML MDRD: 123 ML/MIN/1.73
GLOBULIN UR ELPH-MCNC: 2.8 GM/DL
GLUCOSE SERPL-MCNC: 116 MG/DL (ref 65–99)
HCT VFR BLD AUTO: 41.9 % (ref 37.5–51)
HGB BLD-MCNC: 14.8 G/DL (ref 13–17.7)
IMM GRANULOCYTES # BLD AUTO: 0.15 10*3/MM3 (ref 0–0.05)
IMM GRANULOCYTES NFR BLD AUTO: 1 % (ref 0–0.5)
LYMPHOCYTES # BLD AUTO: 0.33 10*3/MM3 (ref 0.7–3.1)
LYMPHOCYTES NFR BLD AUTO: 2.3 % (ref 19.6–45.3)
MCH RBC QN AUTO: 31.4 PG (ref 26.6–33)
MCHC RBC AUTO-ENTMCNC: 35.3 G/DL (ref 31.5–35.7)
MCV RBC AUTO: 88.8 FL (ref 79–97)
MONOCYTES # BLD AUTO: 0.4 10*3/MM3 (ref 0.1–0.9)
MONOCYTES NFR BLD AUTO: 2.8 % (ref 5–12)
NEUTROPHILS NFR BLD AUTO: 13.49 10*3/MM3 (ref 1.7–7)
NEUTROPHILS NFR BLD AUTO: 93.8 % (ref 42.7–76)
NRBC BLD AUTO-RTO: 0 /100 WBC (ref 0–0.2)
PLATELET # BLD AUTO: 305 10*3/MM3 (ref 140–450)
PMV BLD AUTO: 9.7 FL (ref 6–12)
POTASSIUM SERPL-SCNC: 4.4 MMOL/L (ref 3.5–5.2)
PROT SERPL-MCNC: 5.6 G/DL (ref 6–8.5)
RBC # BLD AUTO: 4.72 10*6/MM3 (ref 4.14–5.8)
SODIUM SERPL-SCNC: 133 MMOL/L (ref 136–145)
WBC # BLD AUTO: 14.38 10*3/MM3 (ref 3.4–10.8)

## 2021-02-08 PROCEDURE — 94660 CPAP INITIATION&MGMT: CPT

## 2021-02-08 PROCEDURE — 94799 UNLISTED PULMONARY SVC/PX: CPT

## 2021-02-08 PROCEDURE — 25010000002 ENOXAPARIN PER 10 MG: Performed by: INTERNAL MEDICINE

## 2021-02-08 PROCEDURE — 99232 SBSQ HOSP IP/OBS MODERATE 35: CPT | Performed by: INTERNAL MEDICINE

## 2021-02-08 PROCEDURE — 71045 X-RAY EXAM CHEST 1 VIEW: CPT

## 2021-02-08 PROCEDURE — 85025 COMPLETE CBC W/AUTO DIFF WBC: CPT | Performed by: INTERNAL MEDICINE

## 2021-02-08 PROCEDURE — 63710000001 DEXAMETHASONE PER 0.25 MG: Performed by: INTERNAL MEDICINE

## 2021-02-08 PROCEDURE — 80053 COMPREHEN METABOLIC PANEL: CPT | Performed by: INTERNAL MEDICINE

## 2021-02-08 PROCEDURE — 86140 C-REACTIVE PROTEIN: CPT | Performed by: FAMILY MEDICINE

## 2021-02-08 PROCEDURE — 25010000002 MEROPENEM PER 100 MG: Performed by: INTERNAL MEDICINE

## 2021-02-08 PROCEDURE — 82728 ASSAY OF FERRITIN: CPT | Performed by: FAMILY MEDICINE

## 2021-02-08 RX ADMIN — MEROPENEM 1 G: 1 INJECTION, POWDER, FOR SOLUTION INTRAVENOUS at 17:05

## 2021-02-08 RX ADMIN — MEROPENEM 1 G: 1 INJECTION, POWDER, FOR SOLUTION INTRAVENOUS at 08:00

## 2021-02-08 RX ADMIN — ALBUTEROL SULFATE 2 PUFF: 90 AEROSOL, METERED RESPIRATORY (INHALATION) at 19:22

## 2021-02-08 RX ADMIN — OXYCODONE HYDROCHLORIDE AND ACETAMINOPHEN 500 MG: 500 TABLET ORAL at 08:02

## 2021-02-08 RX ADMIN — ALBUTEROL SULFATE 2 PUFF: 90 AEROSOL, METERED RESPIRATORY (INHALATION) at 11:08

## 2021-02-08 RX ADMIN — ENOXAPARIN SODIUM 40 MG: 40 INJECTION SUBCUTANEOUS at 08:01

## 2021-02-08 RX ADMIN — Medication 1000 UNITS: at 08:02

## 2021-02-08 RX ADMIN — ALBUTEROL SULFATE 2 PUFF: 90 AEROSOL, METERED RESPIRATORY (INHALATION) at 15:33

## 2021-02-08 RX ADMIN — ALPRAZOLAM 0.25 MG: 0.25 TABLET ORAL at 08:01

## 2021-02-08 RX ADMIN — POLYETHYLENE GLYCOL (3350) 17 G: 17 POWDER, FOR SOLUTION ORAL at 08:01

## 2021-02-08 RX ADMIN — VERAPAMIL HYDROCHLORIDE 180 MG: 180 TABLET, FILM COATED, EXTENDED RELEASE ORAL at 08:02

## 2021-02-08 RX ADMIN — SODIUM CHLORIDE, PRESERVATIVE FREE 10 ML: 5 INJECTION INTRAVENOUS at 21:10

## 2021-02-08 RX ADMIN — SODIUM CHLORIDE, PRESERVATIVE FREE 10 ML: 5 INJECTION INTRAVENOUS at 08:03

## 2021-02-08 RX ADMIN — DOCUSATE SODIUM 100 MG: 100 CAPSULE ORAL at 08:02

## 2021-02-08 RX ADMIN — DEXAMETHASONE 6 MG: 4 TABLET ORAL at 08:03

## 2021-02-08 RX ADMIN — COLCHICINE 0.6 MG: 0.6 TABLET, FILM COATED ORAL at 08:02

## 2021-02-08 RX ADMIN — OXYCODONE HYDROCHLORIDE AND ACETAMINOPHEN 500 MG: 500 TABLET ORAL at 20:28

## 2021-02-08 RX ADMIN — AMLODIPINE BESYLATE 5 MG: 5 TABLET ORAL at 08:02

## 2021-02-08 RX ADMIN — ALPRAZOLAM 0.25 MG: 0.25 TABLET ORAL at 20:28

## 2021-02-08 RX ADMIN — ATORVASTATIN CALCIUM 10 MG: 10 TABLET, FILM COATED ORAL at 20:28

## 2021-02-08 RX ADMIN — ALPRAZOLAM 0.25 MG: 0.25 TABLET ORAL at 15:46

## 2021-02-08 RX ADMIN — FAMOTIDINE 20 MG: 20 TABLET, FILM COATED ORAL at 08:02

## 2021-02-08 RX ADMIN — Medication 3 MG: at 20:28

## 2021-02-08 RX ADMIN — MEROPENEM 1 G: 1 INJECTION, POWDER, FOR SOLUTION INTRAVENOUS at 02:29

## 2021-02-08 RX ADMIN — ALBUTEROL SULFATE 2 PUFF: 90 AEROSOL, METERED RESPIRATORY (INHALATION) at 07:35

## 2021-02-08 RX ADMIN — Medication 15 ML: at 08:01

## 2021-02-08 RX ADMIN — DEXAMETHASONE 6 MG: 4 TABLET ORAL at 20:27

## 2021-02-08 RX ADMIN — HYDROCHLOROTHIAZIDE 25 MG: 25 TABLET ORAL at 08:02

## 2021-02-08 RX ADMIN — ZINC SULFATE 220 MG (50 MG) CAPSULE 220 MG: CAPSULE at 08:02

## 2021-02-08 RX ADMIN — ASPIRIN 81 MG: 81 TABLET, CHEWABLE ORAL at 08:02

## 2021-02-08 RX ADMIN — FAMOTIDINE 20 MG: 20 TABLET, FILM COATED ORAL at 17:07

## 2021-02-08 NOTE — PROGRESS NOTES
Continued Stay Note  NIR Allan     Patient Name: Santiago Spence  MRN: [id number]  Today's Date: 2/8/2021    Admit Date: 2/2/2021    Discharge Plan     Row Name 02/08/21 1319       Plan    Plan  unclear/LTAC?    Patient/Family in Agreement with Plan  yes    Plan Comments  Patient in ICU on vapotherm.  Could utilize LTAC unit if patient has difficulty weaning off vapotherm.  SW will follow and assist with plan/needs.        Discharge Codes    No documentation.             VIPUL Nava

## 2021-02-08 NOTE — PROGRESS NOTES
[facility] Medicine Services  INPATIENT PROGRESS NOTE    Patient Name: Santiago Spence  Date of Admission: 2/2/2021  Today's Date: 02/08/21  Length of Stay: 6  Primary Care Physician: Meredith Lloyd APRN    Subjective   Chief Complaint: SOA  HPI   Doing ok.  Afebrile.  Feels a bit better.  Less SOA, but desats while talking/eating/drinking.        Review of Systems   Constitutional: Positive for fatigue. Negative for fever.   HENT: Negative for congestion and ear pain.    Eyes: Negative for redness and visual disturbance.   Respiratory: Positive for cough and shortness of breath. Negative for wheezing.    Cardiovascular: Negative for chest pain and palpitations.   Gastrointestinal: Negative for abdominal pain, diarrhea, nausea and vomiting.   Endocrine: Negative for cold intolerance and heat intolerance.   Genitourinary: Negative for dysuria and frequency.   Musculoskeletal: Negative for arthralgias and back pain.   Skin: Negative for rash and wound.   Neurological: Negative for dizziness and headaches.   Psychiatric/Behavioral: Negative for confusion. The patient is not nervous/anxious.         All pertinent negatives and positives are as above. All other systems have been reviewed and are negative unless otherwise stated.     Objective    Temp:  [96 °F (35.6 °C)-97.4 °F (36.3 °C)] 97.1 °F (36.2 °C)  Heart Rate:  [60-96] 72  Resp:  [16-29] 18  BP: ()/() 98/60  Physical Exam  Vitals signs reviewed.   Constitutional:       Appearance: He is well-developed.   HENT:      Head: Normocephalic and atraumatic.      Right Ear: External ear normal.      Left Ear: External ear normal.      Nose: Nose normal.   Eyes:      General: No scleral icterus.        Right eye: No discharge.         Left eye: No discharge.      Conjunctiva/sclera: Conjunctivae normal.      Pupils: Pupils are equal, round, and reactive to light.   Neck:      Musculoskeletal: Normal range of motion  and neck supple.      Thyroid: No thyromegaly.      Trachea: No tracheal deviation.   Cardiovascular:      Rate and Rhythm: Normal rate and regular rhythm.      Heart sounds: Normal heart sounds. No murmur. No friction rub. No gallop.    Pulmonary:      Effort: Pulmonary effort is normal. No respiratory distress.      Breath sounds: No stridor. Decreased breath sounds and rhonchi present. No wheezing or rales.   Chest:      Chest wall: No tenderness.   Abdominal:      General: Bowel sounds are normal. There is no distension.      Palpations: Abdomen is soft. There is no mass.      Tenderness: There is no abdominal tenderness. There is no guarding or rebound.      Hernia: No hernia is present.   Musculoskeletal: Normal range of motion.         General: No deformity.   Lymphadenopathy:      Cervical: No cervical adenopathy.   Skin:     General: Skin is warm and dry.      Coloration: Skin is not pale.      Findings: No erythema or rash.   Neurological:      Mental Status: He is alert and oriented to person, place, and time.      Cranial Nerves: No cranial nerve deficit.      Motor: No abnormal muscle tone.      Coordination: Coordination normal.      Deep Tendon Reflexes: Reflexes are normal and symmetric. Reflexes normal.   Psychiatric:         Behavior: Behavior normal.         Thought Content: Thought content normal.         Judgment: Judgment normal.           Results Review:  I have reviewed the labs, radiology results, and diagnostic studies.    Laboratory Data:   Results from last 7 days   Lab Units 02/08/21  0554 02/07/21  0629 02/06/21  0456   WBC 10*3/mm3 14.38* 16.47* 14.46*   HEMOGLOBIN g/dL 14.8 16.6 16.2   HEMATOCRIT % 41.9 47.2 46.4   PLATELETS 10*3/mm3 305 355 295        Results from last 7 days   Lab Units 02/08/21  0554 02/07/21  0629 02/06/21  0456   SODIUM mmol/L 133* 138 139   POTASSIUM mmol/L 4.4 4.2 4.1   CHLORIDE mmol/L 100 99 97*   CO2 mmol/L 30.0* 30.0* 34.0*   BUN mg/dL 26* 31* 40*    CREATININE mg/dL 0.64* 0.72* 0.88   CALCIUM mg/dL 8.2* 8.7 8.7   BILIRUBIN mg/dL 0.5 0.6 0.5   ALK PHOS U/L 56 66 59   ALT (SGPT) U/L 20 21 19   AST (SGOT) U/L 23 28 29   GLUCOSE mg/dL 116* 125* 121*       Culture Data:   Blood Culture   Date Value Ref Range Status   02/02/2021 No growth at 5 days  Final   02/02/2021 No growth at 5 days  Final     Respiratory Culture   Date Value Ref Range Status   02/05/2021   Final    Light growth (2+) Normal Respiratory Jazz: NO S.aureus/MRSA or Pseudomonas aeruginosa   02/02/2021   Final    Heavy growth (4+) Normal Respiratory Jazz: NO S.aureus/MRSA or Pseudomonas aeruginosa       Radiology Data:   Imaging Results (Last 24 Hours)     Procedure Component Value Units Date/Time    XR Chest 1 View [[id number]] Collected: 02/08/21 1405     Updated: 02/08/21 1409    Narrative:      EXAMINATION: XR CHEST 1 VW-     2/8/2021 12:55 PM CST     HISTORY: follow up covid     1 view chest x-ray compared with 6 days ago.     Persistent bibasilar infiltrate with partial clearing of the upper  lobes.     No pneumothorax.     Stable heart and mediastinum.     Summary:  1. Persistent bibasilar infiltrate.  2. Partial clearing of the upper lobes.  This report was finalized on 02/08/2021 14:06 by Dr. Jose Manuel Brown MD.          I have reviewed the patient's current medications.     Assessment/Plan     Active Hospital Problems    Diagnosis   • Obesity (BMI 30-39.9)   • Pneumonia due to COVID-19 virus   • Acute respiratory failure with hypoxia (CMS/HCC)   • Essential hypertension     Labs reviewed:  Leukocytosis    Imaging reviewed: CXR    Imaging independently interpreted by me: bilateral opacities    Telemetry reviewed    Telemetry independently interpreted by me: NSR    Mr. Spence is a 70 year old gentleman with a history of HTN admitted with Acute respiratory failure with hypoxia secondary to covid-19 pneumonia.    1.  Acute respiratory failure with hypoxia  -Decadron day #8  -Completed  Remdesivir  -Pulmonology following   -ID following  -Merem    2.  COVID-19 pneumonia  -Decadron day #8  -Completed Remdesivir  -Pulmonology following   -ID following  -Merem  -Zinc  -Statin      3.  HTN  -HCTZ  -Norvasc    4.  Obesity-BMI 37.9  -Dietician consult.    DVT PPX:        Discharge Planning: I expect the patient to be discharged to home in ? days  Electronically signed by Jose Russell MD, 02/08/21, 17:51 CST.

## 2021-02-08 NOTE — PROGRESS NOTES
PULMONARY AND CRITICAL CARE PROGRESS NOTE - [facility]    Patient: Santiago Spence  1950   MR# [id number]   Acct# [id number]  02/07/21   18:24 CST  Referring Provider: Aime Rivera MD    Chief Complaint: Covid-19 pneumonia, acute respiratory failure, obstructive sleep apnea, obesity,    Interval history:   Patient was seen in the intensive care unit in Covid isolation from outside the glass door to reduce the risk of cross infection exposure and to minimize use of PPE.  He looks better and he was waving at me and told he did not feel any worse and actually feeling little better.  Cording to RN he is doing overall well and still requiring 40 L flow and 100% FiO2 on Vapotherm and is doing BiPAP on and off.  He is unable to do a complete prone ventilation but lying on his sides.  His respiratory status is stable and he is currently getting treatment for COVID-19 with the  remdesivir, dexamethasone zinc and melatonin and also getting meropenem for secondary bacterial infection.  ID is following.  He has no other acute events overnight and apparently shortness of breath is better than before.      Meds:  albuterol sulfate HFA, 2 puff, Inhalation, 4x Daily - RT  ALPRAZolam, 0.25 mg, Oral, TID  amLODIPine, 5 mg, Oral, Q24H  ascorbic acid, 500 mg, Oral, BID  aspirin, 81 mg, Oral, Daily  atorvastatin, 10 mg, Oral, Nightly  cholecalciferol, 1,000 Units, Oral, Daily  colchicine, 0.6 mg, Oral, Daily  dexamethasone, 6 mg, Oral, Q12H    Or  dexamethasone, 6 mg, Intravenous, Q12H  docusate sodium, 100 mg, Oral, Daily  enoxaparin, 40 mg, Subcutaneous, Q24H  famotidine, 20 mg, Oral, BID AC  hydroCHLOROthiazide, 25 mg, Oral, Daily  melatonin, 3 mg, Oral, Nightly  meropenem, 1 g, Intravenous, Q8H  multivitamin and minerals, 15 mL, Oral, Daily  polyethylene glycol, 17 g, Oral, Daily  sodium chloride, 10 mL, Intravenous, Q12H  verapamil SR, 180 mg, Oral, Q24H  zinc sulfate, 220 mg, Oral, Daily         Review of  Systems:   Review of Systems:    Constitutional: Positive for fatigue and fever.  Improved.  HENT: Positive for congestion and postnasal drip.    Eyes: Negative.    Respiratory: Positive for cough, chest tightness and wheezing.    Improved.  Cardiovascular: Negative for chest pain, palpitations and leg swelling.   Gastrointestinal: Negative.  Negative for diarrhea, nausea and vomiting.   Endocrine: Negative.    Genitourinary: Negative.    Musculoskeletal: Positive for arthralgias and back pain.   Skin: Negative.    Allergic/Immunologic: Positive for environmental allergies.   Neurological: Negative.    Hematological: Negative.    Psychiatric/Behavioral: The patient is nervous/anxious.      Physical Exam:  SpO2 Percentage    02/07/21 1553 02/07/21 1600 02/07/21 1700   SpO2: 94% 91% (!) 88%     Temp:  [96.5 °F (35.8 °C)-98.7 °F (37.1 °C)] 98.7 °F (37.1 °C)  Heart Rate:  [58-92] 85  Resp:  [18-28] 20  BP: (109-138)/(55-77) 136/71    Intake/Output Summary (Last 24 hours) at 2/7/2021 1824  Last data filed at 2/7/2021 1700  Gross per 24 hour   Intake 640.81 ml   Output 1505 ml   Net -864.19 ml     Based on nursing assessment.    GENERAL/CONSTITUTIONAL: No distress.   HEENT: atraumatic, normocephalic  NOSE: normal, high flow nasal cannula in place  NECK: jugular veins nondistended  CHEST: no paradox, no retractions.  No respiratory distress.   CARDIAC: Sinus rhythm  ABDOMEN: nondistended  : Deferred  EXTREMITIES: Mild lower extremity edema.  NEURO: Awake and alert, waving  SKIN: no jaundice.  No rash    Laboratory Data:  Results from last 7 days   Lab Units 02/07/21  0629 02/06/21  0456 02/05/21  0445   WBC 10*3/mm3 16.47* 14.46* 8.80   HEMOGLOBIN g/dL 16.6 16.2 15.7   PLATELETS 10*3/mm3 355 295 256     Results from last 7 days   Lab Units 02/07/21  0629 02/06/21  0456 02/05/21  0445 02/04/21  0411  02/02/21  0424   SODIUM mmol/L 138 139 138 137   < > 137   POTASSIUM mmol/L 4.2 4.1 4.0 3.8   < > 4.0   BUN mg/dL 31* 40*  35* 32*   < > 20   CREATININE mg/dL 0.72* 0.88 0.71* 0.68*   < > 0.79   CRP mg/dL  --  2.45*  --  9.79*  --  29.57*   PROCALCITONIN ng/mL  --   --   --   --   --  0.24   FERRITIN ng/mL  --  506.30*  --  648.80*  --  525.00*   D DIMER QUANT mg/L (FEU) 0.77*  --  0.62*  --    < > 0.79*    < > = values in this interval not displayed.     Results from last 7 days   Lab Units 02/06/21  0325 02/05/21  0455 02/04/21  0520   PH, ARTERIAL pH units 7.453* 7.438 7.444   PCO2, ARTERIAL mm Hg 47.8* 47.3* 44.8   PO2 ART mm Hg 98.8 70.3* 67.0*   FIO2 % 90 100 100     Blood Culture   Date Value Ref Range Status   02/02/2021 No growth at 2 days  Preliminary   02/02/2021 No growth at 2 days  Preliminary     Respiratory Culture   Date Value Ref Range Status   02/02/2021   Preliminary    Heavy growth (4+) Normal Respiratory Jazz: NO S.aureus/MRSA or Pseudomonas aeruginosa     Recent films:  No radiology results for the last day  Films reviewed personally by me.  My interpretation: None today, 2-5-21    Pulmonary Assessment:    1. SARS Covid-19 viral pneumonia  2. Acute respiratory failure with hypoxia related to COVID-19  3. Bilateral lung infiltrates  4. History of colon cancer  5. Obese  6. Obstructive sleep apnea, does not use his CPAP    Recommend:   · His respiratory status is stable.  Continue on BiPAP and Vapotherm as needed.  Continue supplemental oxygen and titrate keep oxygen saturation more than 92%.  · He is currently on BiPAP 16/10 and FiO2 100 alternating with Vapotherm 40 L and 100 FiO2  · DVT prophylaxis, Lovenox, 40 daily  · Mobilize as tolerated  · Proning candidate: Yes if able to coordinate given continuous BiPAP need for he is not doing complete proning but sleeping on his sides.  · Dexamethasone day 4 of 10  · Remdesivir dose per pharmacy.  ID following.  · Continue dexamethasone, melatonin and zinc and other adjunct treatment for COVID-19.  · Colchicine started per attending  · Diuresis as needed and monitor  renal function electrolytes.  · HFA bronchodilators  · Continue incentive spirometry, flutter valve.  · Nutritional support with oral diet  · Consider intubation and mechanical ventilation if condition worsens.  · Prognosis guarded.CODE STATUS full   · Repeat labs and imaging studies from time to time.  · Continue respiratory and contact isolation  · Pulmonary team will continue following and make further recommendation    Electronically signed by     Cora Ewing MD  Pulmonologist/Intensivist  2/7/2021 18:24 CST

## 2021-02-09 LAB
ALBUMIN SERPL-MCNC: 2.8 G/DL (ref 3.5–5.2)
ALBUMIN/GLOB SERPL: 0.9 G/DL
ALP SERPL-CCNC: 62 U/L (ref 39–117)
ALT SERPL W P-5'-P-CCNC: 22 U/L (ref 1–41)
ANION GAP SERPL CALCULATED.3IONS-SCNC: 6 MMOL/L (ref 5–15)
AST SERPL-CCNC: 26 U/L (ref 1–40)
BASOPHILS # BLD AUTO: 0.01 10*3/MM3 (ref 0–0.2)
BASOPHILS NFR BLD AUTO: 0.1 % (ref 0–1.5)
BILIRUB SERPL-MCNC: 0.5 MG/DL (ref 0–1.2)
BUN SERPL-MCNC: 26 MG/DL (ref 8–23)
BUN/CREAT SERPL: 38.8 (ref 7–25)
CALCIUM SPEC-SCNC: 8.3 MG/DL (ref 8.6–10.5)
CHLORIDE SERPL-SCNC: 100 MMOL/L (ref 98–107)
CO2 SERPL-SCNC: 30 MMOL/L (ref 22–29)
CREAT SERPL-MCNC: 0.67 MG/DL (ref 0.76–1.27)
D DIMER PPP FEU-MCNC: 0.85 MG/L (FEU) (ref 0–0.5)
DEPRECATED RDW RBC AUTO: 43.8 FL (ref 37–54)
EOSINOPHIL # BLD AUTO: 0 10*3/MM3 (ref 0–0.4)
EOSINOPHIL NFR BLD AUTO: 0 % (ref 0.3–6.2)
ERYTHROCYTE [DISTWIDTH] IN BLOOD BY AUTOMATED COUNT: 13 % (ref 12.3–15.4)
FIBRINOGEN PPP-MCNC: 543 MG/DL (ref 240–460)
GFR SERPL CREATININE-BSD FRML MDRD: 117 ML/MIN/1.73
GLOBULIN UR ELPH-MCNC: 3.1 GM/DL
GLUCOSE SERPL-MCNC: 118 MG/DL (ref 65–99)
HCT VFR BLD AUTO: 44.7 % (ref 37.5–51)
HGB BLD-MCNC: 14.8 G/DL (ref 13–17.7)
IMM GRANULOCYTES # BLD AUTO: 0.11 10*3/MM3 (ref 0–0.05)
IMM GRANULOCYTES NFR BLD AUTO: 1.1 % (ref 0–0.5)
LDH SERPL-CCNC: 403 U/L (ref 135–225)
LYMPHOCYTES # BLD AUTO: 0.33 10*3/MM3 (ref 0.7–3.1)
LYMPHOCYTES NFR BLD AUTO: 3.3 % (ref 19.6–45.3)
MCH RBC QN AUTO: 30.3 PG (ref 26.6–33)
MCHC RBC AUTO-ENTMCNC: 33.1 G/DL (ref 31.5–35.7)
MCV RBC AUTO: 91.6 FL (ref 79–97)
MONOCYTES # BLD AUTO: 0.41 10*3/MM3 (ref 0.1–0.9)
MONOCYTES NFR BLD AUTO: 4.1 % (ref 5–12)
NEUTROPHILS NFR BLD AUTO: 9.25 10*3/MM3 (ref 1.7–7)
NEUTROPHILS NFR BLD AUTO: 91.4 % (ref 42.7–76)
NRBC BLD AUTO-RTO: 0 /100 WBC (ref 0–0.2)
PLATELET # BLD AUTO: 332 10*3/MM3 (ref 140–450)
PMV BLD AUTO: 10 FL (ref 6–12)
POTASSIUM SERPL-SCNC: 4.7 MMOL/L (ref 3.5–5.2)
PROT SERPL-MCNC: 5.9 G/DL (ref 6–8.5)
RBC # BLD AUTO: 4.88 10*6/MM3 (ref 4.14–5.8)
SODIUM SERPL-SCNC: 136 MMOL/L (ref 136–145)
WBC # BLD AUTO: 10.11 10*3/MM3 (ref 3.4–10.8)

## 2021-02-09 PROCEDURE — 25010000002 ENOXAPARIN PER 10 MG: Performed by: INTERNAL MEDICINE

## 2021-02-09 PROCEDURE — 94799 UNLISTED PULMONARY SVC/PX: CPT

## 2021-02-09 PROCEDURE — 85025 COMPLETE CBC W/AUTO DIFF WBC: CPT | Performed by: INTERNAL MEDICINE

## 2021-02-09 PROCEDURE — 80053 COMPREHEN METABOLIC PANEL: CPT | Performed by: INTERNAL MEDICINE

## 2021-02-09 PROCEDURE — 99232 SBSQ HOSP IP/OBS MODERATE 35: CPT | Performed by: INTERNAL MEDICINE

## 2021-02-09 PROCEDURE — 85384 FIBRINOGEN ACTIVITY: CPT | Performed by: INTERNAL MEDICINE

## 2021-02-09 PROCEDURE — 94660 CPAP INITIATION&MGMT: CPT

## 2021-02-09 PROCEDURE — 83615 LACTATE (LD) (LDH) ENZYME: CPT | Performed by: INTERNAL MEDICINE

## 2021-02-09 PROCEDURE — 85379 FIBRIN DEGRADATION QUANT: CPT | Performed by: INTERNAL MEDICINE

## 2021-02-09 PROCEDURE — 63710000001 DEXAMETHASONE PER 0.25 MG: Performed by: INTERNAL MEDICINE

## 2021-02-09 PROCEDURE — 25010000002 MEROPENEM PER 100 MG: Performed by: INTERNAL MEDICINE

## 2021-02-09 PROCEDURE — 25010000002 MEROPENEM: Performed by: INTERNAL MEDICINE

## 2021-02-09 RX ORDER — ECHINACEA PURPUREA EXTRACT 125 MG
1 TABLET ORAL AS NEEDED
Status: DISCONTINUED | OUTPATIENT
Start: 2021-02-09 | End: 2021-02-16 | Stop reason: HOSPADM

## 2021-02-09 RX ADMIN — ALBUTEROL SULFATE 2 PUFF: 90 AEROSOL, METERED RESPIRATORY (INHALATION) at 17:07

## 2021-02-09 RX ADMIN — Medication 15 ML: at 09:05

## 2021-02-09 RX ADMIN — DEXAMETHASONE 6 MG: 4 TABLET ORAL at 09:05

## 2021-02-09 RX ADMIN — COLCHICINE 0.6 MG: 0.6 TABLET, FILM COATED ORAL at 09:05

## 2021-02-09 RX ADMIN — FAMOTIDINE 20 MG: 20 TABLET, FILM COATED ORAL at 17:07

## 2021-02-09 RX ADMIN — DOCUSATE SODIUM 100 MG: 100 CAPSULE ORAL at 09:05

## 2021-02-09 RX ADMIN — ALBUTEROL SULFATE 2 PUFF: 90 AEROSOL, METERED RESPIRATORY (INHALATION) at 11:25

## 2021-02-09 RX ADMIN — ALPRAZOLAM 0.25 MG: 0.25 TABLET ORAL at 20:27

## 2021-02-09 RX ADMIN — ALPRAZOLAM 0.25 MG: 0.25 TABLET ORAL at 09:05

## 2021-02-09 RX ADMIN — MEROPENEM 1 G: 1 INJECTION, POWDER, FOR SOLUTION INTRAVENOUS at 17:07

## 2021-02-09 RX ADMIN — HYDROCHLOROTHIAZIDE 25 MG: 25 TABLET ORAL at 09:05

## 2021-02-09 RX ADMIN — OXYCODONE HYDROCHLORIDE AND ACETAMINOPHEN 500 MG: 500 TABLET ORAL at 20:27

## 2021-02-09 RX ADMIN — POLYETHYLENE GLYCOL (3350) 17 G: 17 POWDER, FOR SOLUTION ORAL at 09:04

## 2021-02-09 RX ADMIN — ASPIRIN 81 MG: 81 TABLET, CHEWABLE ORAL at 09:05

## 2021-02-09 RX ADMIN — VERAPAMIL HYDROCHLORIDE 180 MG: 180 TABLET, FILM COATED, EXTENDED RELEASE ORAL at 09:05

## 2021-02-09 RX ADMIN — DEXAMETHASONE 6 MG: 4 TABLET ORAL at 20:27

## 2021-02-09 RX ADMIN — Medication 3 MG: at 20:27

## 2021-02-09 RX ADMIN — ZINC SULFATE 220 MG (50 MG) CAPSULE 220 MG: CAPSULE at 09:05

## 2021-02-09 RX ADMIN — ALBUTEROL SULFATE 2 PUFF: 90 AEROSOL, METERED RESPIRATORY (INHALATION) at 19:45

## 2021-02-09 RX ADMIN — ENOXAPARIN SODIUM 40 MG: 40 INJECTION SUBCUTANEOUS at 09:10

## 2021-02-09 RX ADMIN — SODIUM CHLORIDE, PRESERVATIVE FREE 10 ML: 5 INJECTION INTRAVENOUS at 09:06

## 2021-02-09 RX ADMIN — FAMOTIDINE 20 MG: 20 TABLET, FILM COATED ORAL at 09:06

## 2021-02-09 RX ADMIN — ATORVASTATIN CALCIUM 10 MG: 10 TABLET, FILM COATED ORAL at 20:27

## 2021-02-09 RX ADMIN — MEROPENEM 1 G: 1 INJECTION, POWDER, FOR SOLUTION INTRAVENOUS at 09:06

## 2021-02-09 RX ADMIN — Medication 1000 UNITS: at 09:05

## 2021-02-09 RX ADMIN — MEROPENEM 1 G: 1 INJECTION, POWDER, FOR SOLUTION INTRAVENOUS at 00:28

## 2021-02-09 RX ADMIN — OXYCODONE HYDROCHLORIDE AND ACETAMINOPHEN 500 MG: 500 TABLET ORAL at 09:05

## 2021-02-09 RX ADMIN — SODIUM CHLORIDE, PRESERVATIVE FREE 10 ML: 5 INJECTION INTRAVENOUS at 20:27

## 2021-02-09 RX ADMIN — ALBUTEROL SULFATE 2 PUFF: 90 AEROSOL, METERED RESPIRATORY (INHALATION) at 07:08

## 2021-02-09 RX ADMIN — ALPRAZOLAM 0.25 MG: 0.25 TABLET ORAL at 17:07

## 2021-02-09 RX ADMIN — AMLODIPINE BESYLATE 5 MG: 5 TABLET ORAL at 09:05

## 2021-02-09 NOTE — PROGRESS NOTES
PULMONARY AND CRITICAL CARE PROGRESS NOTE - [facility]    Patient: Santiago Spence  1950   MR# [id number]   Acct# [id number]  02/09/21   13:39 CST  Referring Provider: Jose Russell MD    Chief Complaint: Covid-19 pneumonia, acute respiratory failure    Interval history:   Patient was seen in the intensive care unit in Covid isolation from outside the glass door to reduce the risk of cross infection exposure and to minimize use of PPE.  The patient is sitting up in the chair at bedside.  His Vapotherm has malfunctioned and O2 sat was noted to be 87%.  I notified respiratory therapy who immediately donned PPE and placed the patient on BiPAP which was at bedside.  In this time the patient did desat to 78%.  After initiating BiPAP the patient O2 sat improved into the 90s quickly.  The patient did not appear to be in respiratory distress during the event.  No other aggravating or alleviating factors.      Meds:  albuterol sulfate HFA, 2 puff, Inhalation, 4x Daily - RT  ALPRAZolam, 0.25 mg, Oral, TID  amLODIPine, 5 mg, Oral, Q24H  ascorbic acid, 500 mg, Oral, BID  aspirin, 81 mg, Oral, Daily  atorvastatin, 10 mg, Oral, Nightly  cholecalciferol, 1,000 Units, Oral, Daily  colchicine, 0.6 mg, Oral, Daily  dexamethasone, 6 mg, Oral, Q12H    Or  dexamethasone, 6 mg, Intravenous, Q12H  docusate sodium, 100 mg, Oral, Daily  enoxaparin, 40 mg, Subcutaneous, Q24H  famotidine, 20 mg, Oral, BID AC  hydroCHLOROthiazide, 25 mg, Oral, Daily  melatonin, 3 mg, Oral, Nightly  meropenem, 1 g, Intravenous, Q8H  multivitamin and minerals, 15 mL, Oral, Daily  polyethylene glycol, 17 g, Oral, Daily  sodium chloride, 10 mL, Intravenous, Q12H  verapamil SR, 180 mg, Oral, Q24H  zinc sulfate, 220 mg, Oral, Daily         Review of Systems:   Review of Systems   Constitutional: Negative for chills and fever.   Respiratory: Positive for shortness of breath.    Cardiovascular: Negative for chest pain.   Gastrointestinal:  Negative for diarrhea, nausea and vomiting.   :      Physical Exam:  SpO2 Percentage    02/09/21 0915 02/09/21 1035 02/09/21 1125   SpO2: 94% 94% 92%     Temp:  [97 °F (36.1 °C)-97.4 °F (36.3 °C)] 97.2 °F (36.2 °C)  Heart Rate:  [51-87] 75  Resp:  [18-30] 20  BP: ()/() 147/68    Intake/Output Summary (Last 24 hours) at 2/9/2021 1339  Last data filed at 2/9/2021 0300  Gross per 24 hour   Intake 206 ml   Output 975 ml   Net -769 ml     Based on nursing assessment.    GENERAL/CONSTITUTIONAL: No distress.   HEENT: atraumatic, normocephalic  NOSE: normal, vapotherm  NECK: jugular veins nondistended  CHEST: no paradox, no retractions.  No respiratory distress.   CARDIAC: Sinus rhythm  ABDOMEN: nondistended  : Deferred  EXTREMITIES: Mild lower extremity edema.  NEURO: Awake and alert, waving  SKIN: no jaundice.  No rash    Laboratory Data:  Results from last 7 days   Lab Units 02/09/21  0542 02/08/21  0554 02/07/21  0629   WBC 10*3/mm3 10.11 14.38* 16.47*   HEMOGLOBIN g/dL 14.8 14.8 16.6   PLATELETS 10*3/mm3 332 305 355     Results from last 7 days   Lab Units 02/09/21  0542 02/08/21  0554 02/07/21  0629 02/06/21  0456   SODIUM mmol/L 136 133* 138 139   POTASSIUM mmol/L 4.7 4.4 4.2 4.1   BUN mg/dL 26* 26* 31* 40*   CREATININE mg/dL 0.67* 0.64* 0.72* 0.88   CRP mg/dL  --  0.57*  --  2.45*   FERRITIN ng/mL  --  490.90*  --  506.30*   D DIMER QUANT mg/L (FEU) 0.85*  --  0.77*  --      Results from last 7 days   Lab Units 02/06/21  0325 02/05/21  0455 02/04/21  0520   PH, ARTERIAL pH units 7.453* 7.438 7.444   PCO2, ARTERIAL mm Hg 47.8* 47.3* 44.8   PO2 ART mm Hg 98.8 70.3* 67.0*   FIO2 % 90 100 100     Blood Culture   Date Value Ref Range Status   02/02/2021 No growth at 2 days  Preliminary   02/02/2021 No growth at 2 days  Preliminary     Respiratory Culture   Date Value Ref Range Status   02/02/2021   Preliminary    Heavy growth (4+) Normal Respiratory Jazz: NO S.aureus/MRSA or Pseudomonas aeruginosa      Recent films:  No radiology results for the last day  Films reviewed personally by me.  My interpretation: None today    Pulmonary Assessment:    1. SARS Covid-19 viral pneumonia -remdesivir completed  2. Acute respiratory failure with hypoxia related to COVID-19  3. Bilateral lung infiltrates  4. History of colon cancer  5. Obese  6. Obstructive sleep apnea, does not use his CPAP    Recommend:   · Continue supplemental oxygen for O2 sat 90% to 94%  · Continue Vapotherm and BiPAP as needed  · Proning candidate: Yes if able to coordinate  · Dexamethasone day 6 of 10 of BID dosing.  If CRP is stable tomorrow ?Transition to once daily dosing?   · DVT prophylaxis-Lovenox  · Stress ulcer prophylaxis-Pepcid  · Zinc, vitamin D, vitamin C, and melatonin  · Antibiotic-Merrem  · IS    Electronically signed by:   VALERIA Lowry    2/9/2021 13:39 CST     ATTESTATION OF CLINICAL NOTE:  I have reviewed the notes, assessments, and/or procedures performed by VALERIA Lowry, I concur with her/his documentation of Santiago Jordy. He was seen in the intensive care unit in [facility] from outside the [place] to reduce the risk of cross infection and exposure and to minimize the use of PPE.     Patient is resting comfortably and sitting up in the chair with Vapotherm 40 L flow 100% FiO2 and he did use BiPAP at night.  He is still requiring high oxygen flow and FiO2 but is able to cut back for some time and is not BiPAP dependent all the time as he was before.   He is feeling overall better although he is hypoxic.  He denies any shortness of breath cough chest pain or any other new complaints.  He is currently getting full treatment for COVID-19 and supportive respiratory care.     Physical examination nursing assessment.  Patient is obese  male sitting up in the bed in no acute distress.  HEENT: Atraumatic normocephalic.  Neck was supple no jugular venous distention noted..  Heart: Has rhythm no  gallop.  Lungs: Diminished air entry and few crackles.  Abdomen: Soft nondistended nontender.  Extremities: No visible ankle edema.  Neurologic: Grossly intact.  Skin: No breakdown.     He is making slow progress and overall stable.  We will renew current treatment plan with supportive respiratory care and bronchodilator treatment.  He is currently on Vapotherm and using BiPAP at night.  Continue titrating FiO2 and oxygen flow to keep oxygen more than 92%.  Plan for outpatient pulmonary clinic visit with sleep study for untreated sleep apnea.  Continue treatment for COVID-19 with zinc vitamin C vitamin D melatonin and dexamethasone.  Continue self proning as tolerated during the day.  He may move out of the intensive care unit today. He is currently getting antibiotic coverage which may be deescalated soon.  Incentive spirometry and flutter valve to improve pulmonary compliance and clearance.  Repeat labs and imaging studies from time to time.  Continue respiratory and contact isolation.  Pulmonary team will continue following him and make further recommendations.     I have seen and examined patient personally, performing a face-to-face diagnostic evaluation with plan of care reviewed and developed with APRN and nursing staff. I have addended and/or modified the above history of present illness, physical examination, and assessment and plan to reflect my findings and impressions. Essential elements of the care plan were discussed with APRN above.  Agree with findings and assessment/plan as documented above.    Cora Ewing MD  Pulmonologist/Intensivist  2/9/2021 16:41 CST

## 2021-02-09 NOTE — PROGRESS NOTES
[facility] Medicine Services  INPATIENT PROGRESS NOTE    Patient Name: Santiago Spence  Date of Admission: 2/2/2021  Today's Date: 02/09/21  Length of Stay: 7  Primary Care Physician: Meredith Lloyd APRN    Subjective   Chief Complaint: SOA  HPI   Doing ok.  Afebrile.  Feels a bit better.  Less SOA, but desats while talking/eating/drinking.        Review of Systems   Constitutional: Positive for fatigue. Negative for fever.   HENT: Negative for congestion and ear pain.    Eyes: Negative for redness and visual disturbance.   Respiratory: Positive for cough and shortness of breath. Negative for wheezing.    Cardiovascular: Negative for chest pain and palpitations.   Gastrointestinal: Negative for abdominal pain, diarrhea, nausea and vomiting.   Endocrine: Negative for cold intolerance and heat intolerance.   Genitourinary: Negative for dysuria and frequency.   Musculoskeletal: Negative for arthralgias and back pain.   Skin: Negative for rash and wound.   Neurological: Negative for dizziness and headaches.   Psychiatric/Behavioral: Negative for confusion. The patient is not nervous/anxious.         All pertinent negatives and positives are as above. All other systems have been reviewed and are negative unless otherwise stated.     Objective    Temp:  [97 °F (36.1 °C)-97.4 °F (36.3 °C)] 97.2 °F (36.2 °C)  Heart Rate:  [51-89] 78  Resp:  [16-30] 19  BP: ()/() 147/68  Physical Exam  Vitals signs reviewed.   Constitutional:       Appearance: He is well-developed.   HENT:      Head: Normocephalic and atraumatic.      Right Ear: External ear normal.      Left Ear: External ear normal.      Nose: Nose normal.   Eyes:      General: No scleral icterus.        Right eye: No discharge.         Left eye: No discharge.      Conjunctiva/sclera: Conjunctivae normal.      Pupils: Pupils are equal, round, and reactive to light.   Neck:      Musculoskeletal: Normal range of motion  and neck supple.      Thyroid: No thyromegaly.      Trachea: No tracheal deviation.   Cardiovascular:      Rate and Rhythm: Normal rate and regular rhythm.      Heart sounds: Normal heart sounds. No murmur. No friction rub. No gallop.    Pulmonary:      Effort: Pulmonary effort is normal. No respiratory distress.      Breath sounds: No stridor. Decreased breath sounds and rhonchi present. No wheezing or rales.   Chest:      Chest wall: No tenderness.   Abdominal:      General: Bowel sounds are normal. There is no distension.      Palpations: Abdomen is soft. There is no mass.      Tenderness: There is no abdominal tenderness. There is no guarding or rebound.      Hernia: No hernia is present.   Musculoskeletal: Normal range of motion.         General: No deformity.   Lymphadenopathy:      Cervical: No cervical adenopathy.   Skin:     General: Skin is warm and dry.      Coloration: Skin is not pale.      Findings: No erythema or rash.   Neurological:      Mental Status: He is alert and oriented to person, place, and time.      Cranial Nerves: No cranial nerve deficit.      Motor: No abnormal muscle tone.      Coordination: Coordination normal.      Deep Tendon Reflexes: Reflexes are normal and symmetric. Reflexes normal.   Psychiatric:         Behavior: Behavior normal.         Thought Content: Thought content normal.         Judgment: Judgment normal.           Results Review:  I have reviewed the labs, radiology results, and diagnostic studies.    Laboratory Data:   Results from last 7 days   Lab Units 02/09/21  0542 02/08/21  0554 02/07/21  0629   WBC 10*3/mm3 10.11 14.38* 16.47*   HEMOGLOBIN g/dL 14.8 14.8 16.6   HEMATOCRIT % 44.7 41.9 47.2   PLATELETS 10*3/mm3 332 305 355        Results from last 7 days   Lab Units 02/09/21  0542 02/08/21  0554 02/07/21  0629   SODIUM mmol/L 136 133* 138   POTASSIUM mmol/L 4.7 4.4 4.2   CHLORIDE mmol/L 100 100 99   CO2 mmol/L 30.0* 30.0* 30.0*   BUN mg/dL 26* 26* 31*    CREATININE mg/dL 0.67* 0.64* 0.72*   CALCIUM mg/dL 8.3* 8.2* 8.7   BILIRUBIN mg/dL 0.5 0.5 0.6   ALK PHOS U/L 62 56 66   ALT (SGPT) U/L 22 20 21   AST (SGOT) U/L 26 23 28   GLUCOSE mg/dL 118* 116* 125*       Culture Data:   Blood Culture   Date Value Ref Range Status   02/02/2021 No growth at 5 days  Final   02/02/2021 No growth at 5 days  Final     Respiratory Culture   Date Value Ref Range Status   02/05/2021   Final    Light growth (2+) Normal Respiratory Jazz: NO S.aureus/MRSA or Pseudomonas aeruginosa   02/02/2021   Final    Heavy growth (4+) Normal Respiratory Jazz: NO S.aureus/MRSA or Pseudomonas aeruginosa       Radiology Data:   Imaging Results (Last 24 Hours)     Procedure Component Value Units Date/Time    XR Chest 1 View [[id number]] Collected: 02/08/21 1405     Updated: 02/08/21 1409    Narrative:      EXAMINATION: XR CHEST 1 VW-     2/8/2021 12:55 PM CST     HISTORY: follow up covid     1 view chest x-ray compared with 6 days ago.     Persistent bibasilar infiltrate with partial clearing of the upper  lobes.     No pneumothorax.     Stable heart and mediastinum.     Summary:  1. Persistent bibasilar infiltrate.  2. Partial clearing of the upper lobes.  This report was finalized on 02/08/2021 14:06 by Dr. Jose Manuel Brown MD.          I have reviewed the patient's current medications.     Assessment/Plan     Active Hospital Problems    Diagnosis   • Obesity (BMI 30-39.9)   • Pneumonia due to COVID-19 virus   • Acute respiratory failure with hypoxia (CMS/HCC)   • Essential hypertension     Labs reviewed:  Leukocytosis resolved      Telemetry reviewed    Telemetry independently interpreted by me: NSR    Mr. Spence is a 70 year old gentleman with a history of HTN admitted with Acute respiratory failure with hypoxia secondary to covid-19 pneumonia.    1.  Acute respiratory failure with hypoxia  -Decadron day #8  -Completed Remdesivir  -Pulmonology following   -ID following  -Merem    2.  COVID-19  pneumonia  -Decadron day #8  -Completed Remdesivir  -Pulmonology following   -ID following  -Merem  -Zinc  -Statin      3.  HTN  -HCTZ  -Norvasc    4.  Obesity-BMI 37.9  -Dietician consult.    DVT PPX:  Lovenox      Discharge Planning: I expect the patient to be discharged to home in ? days  Electronically signed by Jose Russell MD, 02/09/21, 10:50 CST.

## 2021-02-09 NOTE — PLAN OF CARE
Goal Outcome Evaluation:     Nutrition: LOS assessment completed. Pt is currently on regular cardiac diet. Average PO intake 100% of 2 meals. Nutrition is appropriate at this time. RDN will re-screen in 7 days unless consulted.

## 2021-02-09 NOTE — SIGNIFICANT NOTE
Notified by Valorie [facility] pulmonary that vapotherm was alarming.  Machine was flashing error code with equipment malfunctioning.  Quickly entered room and placed patient on bipap.  Oxygen saturations recovered quickly.  Removed vapotherm from service and replaced and placed on new machine.  Safe report entered.  HALEY Pereira notified.

## 2021-02-09 NOTE — PROGRESS NOTES
Infectious Diseases Progress Note    Patient:  Santiago Spence  YOB: 1950  MRN: [id number]   Admit date: 2/2/2021   Admitting Physician: Jose Russell MD  Primary Care Physician: Meredith Lloyd APRN    Chief Complaint/Interval History: He indicates he is feeling a little bit better each day.  He definitely feels better in comparison to 3 to 4 days ago.  At times he can be on less oxygen.  When he exerts himself or eats he will desaturate.  He look stronger.  He is up to chair.  No significant purulent sputum production.  He is without fever.  He has been hemodynamically stable.  Chest x-ray showed some improvement.  Discussed with patient.    Intake/Output Summary (Last 24 hours) at 2/8/2021 1956  Last data filed at 2/8/2021 1547  Gross per 24 hour   Intake 780 ml   Output 1525 ml   Net -745 ml     Allergies: No Known Allergies  Current Scheduled Medications:   albuterol sulfate HFA, 2 puff, Inhalation, 4x Daily - RT  ALPRAZolam, 0.25 mg, Oral, TID  amLODIPine, 5 mg, Oral, Q24H  ascorbic acid, 500 mg, Oral, BID  aspirin, 81 mg, Oral, Daily  atorvastatin, 10 mg, Oral, Nightly  cholecalciferol, 1,000 Units, Oral, Daily  colchicine, 0.6 mg, Oral, Daily  dexamethasone, 6 mg, Oral, Q12H    Or  dexamethasone, 6 mg, Intravenous, Q12H  docusate sodium, 100 mg, Oral, Daily  enoxaparin, 40 mg, Subcutaneous, Q24H  famotidine, 20 mg, Oral, BID AC  hydroCHLOROthiazide, 25 mg, Oral, Daily  melatonin, 3 mg, Oral, Nightly  meropenem, 1 g, Intravenous, Q8H  multivitamin and minerals, 15 mL, Oral, Daily  polyethylene glycol, 17 g, Oral, Daily  sodium chloride, 10 mL, Intravenous, Q12H  verapamil SR, 180 mg, Oral, Q24H  zinc sulfate, 220 mg, Oral, Daily      Current PRN Medications:  •  acetaminophen **OR** acetaminophen  •  benzonatate  •  dextromethorphan polistirex ER  •  labetalol  •  ondansetron **OR** ondansetron  •  sodium chloride    Review of Systems no nausea, diarrhea, or rash.    Vital  "Signs:  /71   Pulse 76   Temp 97.1 °F (36.2 °C) (Axillary)   Resp (!) 30   Ht 162.6 cm (64\")   Wt 100 kg (220 lb 12.8 oz)   SpO2 90%   BMI 37.90 kg/m²     Physical Exam  Vital signs - reviewed.  Line/IV site - No erythema, warmth, induration, or tenderness.    Lab Results:  CBC:   Results from last 7 days   Lab Units 02/08/21  0554 02/07/21  0629 02/06/21  0456 02/05/21  0445 02/04/21  0411 02/03/21  0430 02/02/21  0424   WBC 10*3/mm3 14.38* 16.47* 14.46* 8.80 9.91 10.83* 8.07   HEMOGLOBIN g/dL 14.8 16.6 16.2 15.7 15.1 16.2 16.1   HEMATOCRIT % 41.9 47.2 46.4 44.9 43.5 47.9 48.8   PLATELETS 10*3/mm3 305 355 295 256 230 233 199     BMP:  Results from last 7 days   Lab Units 02/08/21  0554 02/07/21  0629 02/06/21  0456 02/05/21  0445 02/04/21  0411 02/03/21  0430 02/02/21  0424   SODIUM mmol/L 133* 138 139 138 137 136 137   POTASSIUM mmol/L 4.4 4.2 4.1 4.0 3.8 4.1 4.0   CHLORIDE mmol/L 100 99 97* 98 100 97* 97*   CO2 mmol/L 30.0* 30.0* 34.0* 30.0* 28.0 28.0 31.0*   BUN mg/dL 26* 31* 40* 35* 32* 29* 20   CREATININE mg/dL 0.64* 0.72* 0.88 0.71* 0.68* 0.84 0.79   GLUCOSE mg/dL 116* 125* 121* 126* 113* 115* 121*   CALCIUM mg/dL 8.2* 8.7 8.7 8.9 8.6 8.8 8.9   ALT (SGPT) U/L 20 21 19 16 18 20 23     C-reactive protein 0.6-represents significant improvement  Ferritin 490 also trending in a favorable direction    Culture Results:   Blood Culture   Date Value Ref Range Status   02/02/2021 No growth at 5 days  Final   02/02/2021 No growth at 5 days  Final     Respiratory Culture   Date Value Ref Range Status   02/05/2021   Final    Light growth (2+) Normal Respiratory Jazz: NO S.aureus/MRSA or Pseudomonas aeruginosa   02/02/2021   Final    Heavy growth (4+) Normal Respiratory Jazz: NO S.aureus/MRSA or Pseudomonas aeruginosa     Radiology:   CXR today:  Summary:  1. Persistent bibasilar infiltrate.  2. Partial clearing of the upper lobes.  This report was finalized on 02/08/2021 14:06 by Dr. Jose Manuel Brown, " MD.      2D echocardiogram on February 2, 2021:  · The left ventricular cavity is moderately dilated.  · Left ventricular wall thickness is consistent with mild to moderate concentric hypertrophy.  · Mild aortic valve stenosis is present.  · Left ventricular diastolic function is consistent with (grade I) impaired relaxation.  · Left ventricular ejection fraction appears to be 56 - 60%. Left ventricular systolic function is normal.    Impression:   COVID-19 infection  Respiratory failure secondary COVID-19  Diastolic dysfunction  Hypertension    Recommendations:   Continue twice daily dexamethasone  Continue empiric antibiotic treatment with meropenem  Continue to encourage incentive spirometry  He seems to be responding to current treatment  Wean oxygen as tolerated  Continue to follow    Ronni Mattson MD

## 2021-02-10 LAB
ALBUMIN SERPL-MCNC: 2.9 G/DL (ref 3.5–5.2)
ALBUMIN/GLOB SERPL: 1 G/DL
ALP SERPL-CCNC: 62 U/L (ref 39–117)
ALT SERPL W P-5'-P-CCNC: 24 U/L (ref 1–41)
ANION GAP SERPL CALCULATED.3IONS-SCNC: 6 MMOL/L (ref 5–15)
AST SERPL-CCNC: 20 U/L (ref 1–40)
BASOPHILS # BLD AUTO: 0.01 10*3/MM3 (ref 0–0.2)
BASOPHILS NFR BLD AUTO: 0.1 % (ref 0–1.5)
BILIRUB SERPL-MCNC: 0.5 MG/DL (ref 0–1.2)
BUN SERPL-MCNC: 22 MG/DL (ref 8–23)
BUN/CREAT SERPL: 31 (ref 7–25)
CALCIUM SPEC-SCNC: 8.4 MG/DL (ref 8.6–10.5)
CHLORIDE SERPL-SCNC: 100 MMOL/L (ref 98–107)
CO2 SERPL-SCNC: 31 MMOL/L (ref 22–29)
CREAT SERPL-MCNC: 0.71 MG/DL (ref 0.76–1.27)
CRP SERPL-MCNC: 0.24 MG/DL (ref 0–0.5)
DEPRECATED RDW RBC AUTO: 44.1 FL (ref 37–54)
EOSINOPHIL # BLD AUTO: 0 10*3/MM3 (ref 0–0.4)
EOSINOPHIL NFR BLD AUTO: 0 % (ref 0.3–6.2)
ERYTHROCYTE [DISTWIDTH] IN BLOOD BY AUTOMATED COUNT: 13 % (ref 12.3–15.4)
FERRITIN SERPL-MCNC: 543.5 NG/ML (ref 30–400)
GFR SERPL CREATININE-BSD FRML MDRD: 109 ML/MIN/1.73
GLOBULIN UR ELPH-MCNC: 2.9 GM/DL
GLUCOSE SERPL-MCNC: 127 MG/DL (ref 65–99)
HCT VFR BLD AUTO: 46.2 % (ref 37.5–51)
HGB BLD-MCNC: 15.4 G/DL (ref 13–17.7)
IMM GRANULOCYTES # BLD AUTO: 0.13 10*3/MM3 (ref 0–0.05)
IMM GRANULOCYTES NFR BLD AUTO: 1.3 % (ref 0–0.5)
LYMPHOCYTES # BLD AUTO: 0.39 10*3/MM3 (ref 0.7–3.1)
LYMPHOCYTES NFR BLD AUTO: 3.8 % (ref 19.6–45.3)
MCH RBC QN AUTO: 30.7 PG (ref 26.6–33)
MCHC RBC AUTO-ENTMCNC: 33.3 G/DL (ref 31.5–35.7)
MCV RBC AUTO: 92 FL (ref 79–97)
MONOCYTES # BLD AUTO: 0.34 10*3/MM3 (ref 0.1–0.9)
MONOCYTES NFR BLD AUTO: 3.3 % (ref 5–12)
NEUTROPHILS NFR BLD AUTO: 9.42 10*3/MM3 (ref 1.7–7)
NEUTROPHILS NFR BLD AUTO: 91.5 % (ref 42.7–76)
NRBC BLD AUTO-RTO: 0 /100 WBC (ref 0–0.2)
PLATELET # BLD AUTO: 341 10*3/MM3 (ref 140–450)
PMV BLD AUTO: 10.1 FL (ref 6–12)
POTASSIUM SERPL-SCNC: 4.7 MMOL/L (ref 3.5–5.2)
PROT SERPL-MCNC: 5.8 G/DL (ref 6–8.5)
RBC # BLD AUTO: 5.02 10*6/MM3 (ref 4.14–5.8)
SODIUM SERPL-SCNC: 137 MMOL/L (ref 136–145)
WBC # BLD AUTO: 10.29 10*3/MM3 (ref 3.4–10.8)

## 2021-02-10 PROCEDURE — 86140 C-REACTIVE PROTEIN: CPT | Performed by: FAMILY MEDICINE

## 2021-02-10 PROCEDURE — 85025 COMPLETE CBC W/AUTO DIFF WBC: CPT | Performed by: INTERNAL MEDICINE

## 2021-02-10 PROCEDURE — 25010000002 MEROPENEM PER 100 MG: Performed by: INTERNAL MEDICINE

## 2021-02-10 PROCEDURE — 80053 COMPREHEN METABOLIC PANEL: CPT | Performed by: INTERNAL MEDICINE

## 2021-02-10 PROCEDURE — 94799 UNLISTED PULMONARY SVC/PX: CPT

## 2021-02-10 PROCEDURE — 94660 CPAP INITIATION&MGMT: CPT

## 2021-02-10 PROCEDURE — 63710000001 DEXAMETHASONE PER 0.25 MG: Performed by: INTERNAL MEDICINE

## 2021-02-10 PROCEDURE — 82728 ASSAY OF FERRITIN: CPT | Performed by: FAMILY MEDICINE

## 2021-02-10 PROCEDURE — 99232 SBSQ HOSP IP/OBS MODERATE 35: CPT | Performed by: INTERNAL MEDICINE

## 2021-02-10 PROCEDURE — 25010000002 ENOXAPARIN PER 10 MG: Performed by: INTERNAL MEDICINE

## 2021-02-10 RX ORDER — DEXAMETHASONE SODIUM PHOSPHATE 4 MG/ML
6 INJECTION, SOLUTION INTRA-ARTICULAR; INTRALESIONAL; INTRAMUSCULAR; INTRAVENOUS; SOFT TISSUE
Status: DISCONTINUED | OUTPATIENT
Start: 2021-02-11 | End: 2021-02-11

## 2021-02-10 RX ADMIN — OXYCODONE HYDROCHLORIDE AND ACETAMINOPHEN 500 MG: 500 TABLET ORAL at 22:15

## 2021-02-10 RX ADMIN — ATORVASTATIN CALCIUM 10 MG: 10 TABLET, FILM COATED ORAL at 22:16

## 2021-02-10 RX ADMIN — Medication 3 MG: at 22:15

## 2021-02-10 RX ADMIN — MEROPENEM 1 G: 1 INJECTION, POWDER, FOR SOLUTION INTRAVENOUS at 00:22

## 2021-02-10 RX ADMIN — FAMOTIDINE 20 MG: 20 TABLET, FILM COATED ORAL at 08:23

## 2021-02-10 RX ADMIN — ASPIRIN 81 MG: 81 TABLET, CHEWABLE ORAL at 08:22

## 2021-02-10 RX ADMIN — ALBUTEROL SULFATE 2 PUFF: 90 AEROSOL, METERED RESPIRATORY (INHALATION) at 19:07

## 2021-02-10 RX ADMIN — ZINC SULFATE 220 MG (50 MG) CAPSULE 220 MG: CAPSULE at 09:54

## 2021-02-10 RX ADMIN — SODIUM CHLORIDE, PRESERVATIVE FREE 10 ML: 5 INJECTION INTRAVENOUS at 22:16

## 2021-02-10 RX ADMIN — MEROPENEM 1 G: 1 INJECTION, POWDER, FOR SOLUTION INTRAVENOUS at 16:17

## 2021-02-10 RX ADMIN — Medication 15 ML: at 08:21

## 2021-02-10 RX ADMIN — Medication 1000 UNITS: at 08:22

## 2021-02-10 RX ADMIN — ALBUTEROL SULFATE 2 PUFF: 90 AEROSOL, METERED RESPIRATORY (INHALATION) at 16:22

## 2021-02-10 RX ADMIN — DOCUSATE SODIUM 100 MG: 100 CAPSULE ORAL at 08:22

## 2021-02-10 RX ADMIN — ALBUTEROL SULFATE 2 PUFF: 90 AEROSOL, METERED RESPIRATORY (INHALATION) at 12:37

## 2021-02-10 RX ADMIN — HYDROCHLOROTHIAZIDE 25 MG: 25 TABLET ORAL at 08:22

## 2021-02-10 RX ADMIN — ALBUTEROL SULFATE 2 PUFF: 90 AEROSOL, METERED RESPIRATORY (INHALATION) at 07:40

## 2021-02-10 RX ADMIN — DEXAMETHASONE 6 MG: 4 TABLET ORAL at 08:22

## 2021-02-10 RX ADMIN — COLCHICINE 0.6 MG: 0.6 TABLET, FILM COATED ORAL at 08:23

## 2021-02-10 RX ADMIN — POLYETHYLENE GLYCOL (3350) 17 G: 17 POWDER, FOR SOLUTION ORAL at 08:23

## 2021-02-10 RX ADMIN — AMLODIPINE BESYLATE 5 MG: 5 TABLET ORAL at 08:23

## 2021-02-10 RX ADMIN — ENOXAPARIN SODIUM 40 MG: 40 INJECTION SUBCUTANEOUS at 11:00

## 2021-02-10 RX ADMIN — OXYCODONE HYDROCHLORIDE AND ACETAMINOPHEN 500 MG: 500 TABLET ORAL at 08:22

## 2021-02-10 RX ADMIN — VERAPAMIL HYDROCHLORIDE 180 MG: 180 TABLET, FILM COATED, EXTENDED RELEASE ORAL at 08:23

## 2021-02-10 RX ADMIN — FAMOTIDINE 20 MG: 20 TABLET, FILM COATED ORAL at 17:04

## 2021-02-10 RX ADMIN — MEROPENEM 1 G: 1 INJECTION, POWDER, FOR SOLUTION INTRAVENOUS at 08:23

## 2021-02-10 NOTE — PROGRESS NOTES
[facility] Medicine Services  INPATIENT PROGRESS NOTE    Patient Name: Santiago Spence  Date of Admission: 2/2/2021  Today's Date: 02/10/21  Length of Stay: 8  Primary Care Physician: Meredith Lloyd APRN    Subjective   Chief Complaint: SOA  HPI   Doing ok.  Afebrile.  Continues to feel better, still on max vapotherm.  Oxygen sats drop while eating/talking or on minimal activity.        Review of Systems   Constitutional: Positive for fatigue. Negative for fever.   HENT: Negative for congestion and ear pain.    Eyes: Negative for redness and visual disturbance.   Respiratory: Positive for cough and shortness of breath. Negative for wheezing.    Cardiovascular: Negative for chest pain and palpitations.   Gastrointestinal: Negative for abdominal pain, diarrhea, nausea and vomiting.   Endocrine: Negative for cold intolerance and heat intolerance.   Genitourinary: Negative for dysuria and frequency.   Musculoskeletal: Negative for arthralgias and back pain.   Skin: Negative for rash and wound.   Neurological: Negative for dizziness and headaches.   Psychiatric/Behavioral: Negative for confusion. The patient is not nervous/anxious.         All pertinent negatives and positives are as above. All other systems have been reviewed and are negative unless otherwise stated.     Objective    Temp:  [97.1 °F (36.2 °C)-98.4 °F (36.9 °C)] 98.4 °F (36.9 °C)  Heart Rate:  [55-87] 72  Resp:  [13-22] 18  BP: (109-145)/(54-87) 109/66  Physical Exam  Vitals signs reviewed.   Constitutional:       Appearance: He is well-developed.   HENT:      Head: Normocephalic and atraumatic.      Right Ear: External ear normal.      Left Ear: External ear normal.      Nose: Nose normal.   Eyes:      General: No scleral icterus.        Right eye: No discharge.         Left eye: No discharge.      Conjunctiva/sclera: Conjunctivae normal.      Pupils: Pupils are equal, round, and reactive to light.   Neck:       Musculoskeletal: Normal range of motion and neck supple.      Thyroid: No thyromegaly.      Trachea: No tracheal deviation.   Cardiovascular:      Rate and Rhythm: Normal rate and regular rhythm.      Heart sounds: Normal heart sounds. No murmur. No friction rub. No gallop.    Pulmonary:      Effort: Pulmonary effort is normal. No respiratory distress.      Breath sounds: No stridor. Decreased breath sounds and rhonchi present. No wheezing or rales.   Chest:      Chest wall: No tenderness.   Abdominal:      General: Bowel sounds are normal. There is no distension.      Palpations: Abdomen is soft. There is no mass.      Tenderness: There is no abdominal tenderness. There is no guarding or rebound.      Hernia: No hernia is present.   Musculoskeletal: Normal range of motion.         General: No deformity.   Lymphadenopathy:      Cervical: No cervical adenopathy.   Skin:     General: Skin is warm and dry.      Coloration: Skin is not pale.      Findings: No erythema or rash.   Neurological:      Mental Status: He is alert and oriented to person, place, and time.      Cranial Nerves: No cranial nerve deficit.      Motor: No abnormal muscle tone.      Coordination: Coordination normal.      Deep Tendon Reflexes: Reflexes are normal and symmetric. Reflexes normal.   Psychiatric:         Behavior: Behavior normal.         Thought Content: Thought content normal.         Judgment: Judgment normal.           Results Review:  I have reviewed the labs, radiology results, and diagnostic studies.    Laboratory Data:   Results from last 7 days   Lab Units 02/10/21  0241 02/09/21  0542 02/08/21  0554   WBC 10*3/mm3 10.29 10.11 14.38*   HEMOGLOBIN g/dL 15.4 14.8 14.8   HEMATOCRIT % 46.2 44.7 41.9   PLATELETS 10*3/mm3 341 332 305        Results from last 7 days   Lab Units 02/10/21  0241 02/09/21  0542 02/08/21  0554   SODIUM mmol/L 137 136 133*   POTASSIUM mmol/L 4.7 4.7 4.4   CHLORIDE mmol/L 100 100 100   CO2 mmol/L 31.0* 30.0*  30.0*   BUN mg/dL 22 26* 26*   CREATININE mg/dL 0.71* 0.67* 0.64*   CALCIUM mg/dL 8.4* 8.3* 8.2*   BILIRUBIN mg/dL 0.5 0.5 0.5   ALK PHOS U/L 62 62 56   ALT (SGPT) U/L 24 22 20   AST (SGOT) U/L 20 26 23   GLUCOSE mg/dL 127* 118* 116*       Culture Data:   Blood Culture   Date Value Ref Range Status   02/02/2021 No growth at 5 days  Final   02/02/2021 No growth at 5 days  Final     Respiratory Culture   Date Value Ref Range Status   02/05/2021   Final    Light growth (2+) Normal Respiratory Jazz: NO S.aureus/MRSA or Pseudomonas aeruginosa   02/02/2021   Final    Heavy growth (4+) Normal Respiratory Jazz: NO S.aureus/MRSA or Pseudomonas aeruginosa       Radiology Data:   Imaging Results (Last 24 Hours)     ** No results found for the last 24 hours. **          I have reviewed the patient's current medications.     Assessment/Plan     Active Hospital Problems    Diagnosis   • Obesity (BMI 30-39.9)   • Pneumonia due to COVID-19 virus   • Acute respiratory failure with hypoxia (CMS/HCC)   • Essential hypertension     Labs reviewed:  Leukocytosis resolved      Telemetry reviewed    Telemetry independently interpreted by me: NSR    Mr. Spence is a 70 year old gentleman with a history of HTN admitted with Acute respiratory failure with hypoxia secondary to covid-19 pneumonia.    1.  Acute respiratory failure with hypoxia  -Decadron day #9  -Completed Remdesivir  -Pulmonology following   -ID following  -Merem    2.  COVID-19 pneumonia  -Decadron day #9  -Completed Remdesivir  -Pulmonology following   -ID following  -Merem  -Zinc  -Statin      3.  HTN  -HCTZ  -Norvasc    4.  Obesity-BMI 37.9  -Dietician consult.    DVT PPX:  Lovenox      Discharge Planning: I expect the patient to be discharged to home in ? days  Electronically signed by Jose Russell MD, 02/10/21, 10:05 CST.

## 2021-02-10 NOTE — PROGRESS NOTES
PULMONARY AND CRITICAL CARE PROGRESS NOTE - [facility]    Patient: Santiago Spence  1950   MR# [id number]   Acct# [id number]  02/10/21   08:57 CST  Referring Provider: Jose Russell MD    Chief Complaint: Covid-19 pneumonia, acute respiratory failure    Interval history:   Patient was seen in the intensive care unit in [facility] isolation from outside the glass door to reduce the risk of cross infection exposure and to minimize use of PPE.  The patient is sitting up in the chair at bedside.  Current saturation 94 on Vapotherm 40 L and FiO2 100.  Pulse 58.  Respiratory rate 18-20.  He appears awake and alert and comfortable.  No reported issues overnight.  He is still sleeping with BiPAP.       Meds:  albuterol sulfate HFA, 2 puff, Inhalation, 4x Daily - RT  amLODIPine, 5 mg, Oral, Q24H  ascorbic acid, 500 mg, Oral, BID  aspirin, 81 mg, Oral, Daily  atorvastatin, 10 mg, Oral, Nightly  cholecalciferol, 1,000 Units, Oral, Daily  colchicine, 0.6 mg, Oral, Daily  dexamethasone, 6 mg, Oral, Q12H    Or  dexamethasone, 6 mg, Intravenous, Q12H  docusate sodium, 100 mg, Oral, Daily  enoxaparin, 40 mg, Subcutaneous, Q24H  famotidine, 20 mg, Oral, BID AC  hydroCHLOROthiazide, 25 mg, Oral, Daily  melatonin, 3 mg, Oral, Nightly  meropenem, 1 g, Intravenous, Q8H  multivitamin and minerals, 15 mL, Oral, Daily  polyethylene glycol, 17 g, Oral, Daily  sodium chloride, 10 mL, Intravenous, Q12H  verapamil SR, 180 mg, Oral, Q24H  zinc sulfate, 220 mg, Oral, Daily         Review of Systems:   Review of Systems   Constitutional: Negative for chills and fever.   Respiratory: Positive for shortness of breath.    Cardiovascular: Negative for chest pain.   Gastrointestinal: Negative for diarrhea, nausea and vomiting.         Physical Exam:  SpO2 Percentage    02/10/21 0500 02/10/21 0600 02/10/21 0740   SpO2: 100% 96% 90%     Temp:  [97.1 °F (36.2 °C)-98.2 °F (36.8 °C)] 98.2 °F (36.8 °C)  Heart Rate:  [55-87]  78  Resp:  [13-22] 18  BP: (113-147)/(54-68) 115/54    Intake/Output Summary (Last 24 hours) at 2/10/2021 0857  Last data filed at 2/10/2021 0652  Gross per 24 hour   Intake 820 ml   Output 900 ml   Net -80 ml     Based on nursing assessment.    GENERAL/CONSTITUTIONAL: No distress.   HEENT: atraumatic, normocephalic  NOSE: normal, vapotherm  NECK: jugular veins nondistended  CHEST: no paradox, no retractions.  No respiratory distress.   CARDIAC: Sinus bradycardia, rate 58  ABDOMEN: nondistended  : Deferred  EXTREMITIES: Mild lower extremity edema.  NEURO: Awake and alert, waving  SKIN: no jaundice.  No rash    Laboratory Data:  Results from last 7 days   Lab Units 02/10/21  0241 02/09/21  0542 02/08/21  0554   WBC 10*3/mm3 10.29 10.11 14.38*   HEMOGLOBIN g/dL 15.4 14.8 14.8   PLATELETS 10*3/mm3 341 332 305     Results from last 7 days   Lab Units 02/10/21  0241 02/09/21  0542 02/08/21  0554 02/07/21  0629   SODIUM mmol/L 137 136 133* 138   POTASSIUM mmol/L 4.7 4.7 4.4 4.2   BUN mg/dL 22 26* 26* 31*   CREATININE mg/dL 0.71* 0.67* 0.64* 0.72*   CRP mg/dL 0.24  --  0.57*  --    FERRITIN ng/mL 543.50*  --  490.90*  --    D DIMER QUANT mg/L (FEU)  --  0.85*  --  0.77*     Results from last 7 days   Lab Units 02/06/21  0325 02/05/21  0455 02/04/21  0520   PH, ARTERIAL pH units 7.453* 7.438 7.444   PCO2, ARTERIAL mm Hg 47.8* 47.3* 44.8   PO2 ART mm Hg 98.8 70.3* 67.0*   FIO2 % 90 100 100     Blood Culture   Date Value Ref Range Status   02/02/2021 No growth at 2 days  Preliminary   02/02/2021 No growth at 2 days  Preliminary     Respiratory Culture   Date Value Ref Range Status   02/02/2021   Preliminary    Heavy growth (4+) Normal Respiratory Jazz: NO S.aureus/MRSA or Pseudomonas aeruginosa     Recent films:  No radiology results for the last day  Films reviewed personally by me.  My interpretation: None today, 2-10-21    Pulmonary Assessment:    1. SARS Covid-19 viral pneumonia -remdesivir completed  2. Acute  respiratory failure with hypoxia related to COVID-19  3. Bilateral lung infiltrates  4. History of colon cancer  5. Obese  6. Obstructive sleep apnea, does not use his CPAP    Recommend:     · Continue supplemental oxygen for O2 sat 90% to 94%, currently on Vapotherm 40+100 with a saturation 94  · Continue Vapotherm and BiPAP as needed  · Proning candidate: Yes if able to coordinate  · Reduce dexamethasone to once a day dosing, CRP normal range, monitor    · DVT prophylaxis-Lovenox 40 mg daily  · Stress ulcer prophylaxis-Pepcid twice daily  · Zinc, vitamin D, vitamin C, and melatonin  · Antibiotic-Merrem Day 6  · IS, HFA bronchodilators  · Nutrition  · Mobilization   · Prognosis guarded    Electronically signed by: VALERIA Nguyen 2/10/2021 08:57 CST     ATTESTATION OF CLINICAL NOTE:  I have reviewed the notes, assessments, and/or procedures performed by VALERIA Nguyen, I concur with her/his documentation of Santiago Spence.He was seen in the intensive care unit in [facility] from outside the [place] to reduce the risk of cross infection and exposure and to minimize the use of PPE.     Patient is resting comfortably and sitting up in the chair with Vapotherm 40 L flow 100% FiO2 and he did use BiPAP at night.  He is still requiring high oxygen flow and FiO2 but was fully able to cut back for some time and is not BiPAP dependent all the time as he was before.   He is feeling overall better although he is hypoxic.  He denies any shortness of breath cough chest pain or any other new complaints.  He is currently getting full treatment for COVID-19 and supportive respiratory care.     Physical examination nursing assessment.  Patient is obese  male sitting up in the bed in no acute distress.  HEENT: Atraumatic normocephalic.  Neck was supple no jugular venous distention noted..  Heart: Has rhythm no gallop.  Lungs: Diminished air entry and few crackles.  Abdomen: Soft nondistended nontender.   Extremities: No visible ankle edema.  Neurologic: Grossly intact.  Skin: No breakdown.     Continue current treatment plan with supportive respiratory care and bronchodilator treatment.  He is currently on Vapotherm and using BiPAP at night.   However his Vapotherm support could not be decreased and he remains on 40 L flow and 100% FiO2 for the last few days. Continue titrating FiO2 and oxygen flow to keep oxygen more than 92%.  Plan for outpatient pulmonary clinic visit with sleep study for untreated sleep apnea.  Continue treatment for COVID-19 with zinc vitamin C vitamin D melatonin and dexamethasone.  Continue self proning as tolerated during the day.   Due to high oxygen requirement remains in the ICU.  He is currently getting antibiotic coverage with Merrem which may be deescalated soon.  Incentive spirometry and flutter valve to improve pulmonary compliance and clearance.  Repeat labs and imaging studies from time to time.  Continue respiratory and contact isolation.  Pulmonary team will continue following him and make further recommendations.     I have seen and examined patient personally, performing a face-to-face diagnostic evaluation with plan of care reviewed and developed with APRN and nursing staff. I have addended and/or modified the above history of present illness, physical examination, and assessment and plan to reflect my findings and impressions. Essential elements of the care plan were discussed with APRN above.  Agree with findings and assessment/plan as documented above.    Cora Ewing MD  Pulmonologist/Intensivist  2/10/2021 10:59 CST

## 2021-02-10 NOTE — PLAN OF CARE
Pt remains on vapotherm while awake and bipap at night; rested well throughout the night; remains A&O x4; HR and SBP WDL; UOP adequate; afeb

## 2021-02-10 NOTE — PROGRESS NOTES
"Infectious Diseases Progress Note    Patient:  Santiago Spence  YOB: 1950  MRN: [id number]   Admit date: 2/2/2021   Admitting Physician: Jose Russell MD  Primary Care Physician: Meredith Lloyd APRN    Chief Complaint/Interval History: He indicates he is feeling better.  He has been trying to do some walking in place when he moves from bed to chair.  He feels his dyspnea recovers more quickly.  He indicates he definitely feels stronger.  He does not report productive cough.  No chest pain or pressure.    Intake/Output Summary (Last 24 hours) at 2/10/2021 1548  Last data filed at 2/10/2021 0652  Gross per 24 hour   Intake 100 ml   Output 900 ml   Net -800 ml     Allergies: No Known Allergies  Current Scheduled Medications:   albuterol sulfate HFA, 2 puff, Inhalation, 4x Daily - RT  amLODIPine, 5 mg, Oral, Q24H  ascorbic acid, 500 mg, Oral, BID  aspirin, 81 mg, Oral, Daily  atorvastatin, 10 mg, Oral, Nightly  cholecalciferol, 1,000 Units, Oral, Daily  colchicine, 0.6 mg, Oral, Daily  [START ON 2/11/2021] dexamethasone, 6 mg, Oral, Daily With Breakfast    Or  [START ON 2/11/2021] dexamethasone, 6 mg, Intravenous, Daily With Breakfast  docusate sodium, 100 mg, Oral, Daily  enoxaparin, 40 mg, Subcutaneous, Q24H  famotidine, 20 mg, Oral, BID AC  hydroCHLOROthiazide, 25 mg, Oral, Daily  melatonin, 3 mg, Oral, Nightly  meropenem, 1 g, Intravenous, Q8H  multivitamin and minerals, 15 mL, Oral, Daily  polyethylene glycol, 17 g, Oral, Daily  sodium chloride, 10 mL, Intravenous, Q12H  verapamil SR, 180 mg, Oral, Q24H  zinc sulfate, 220 mg, Oral, Daily      Current PRN Medications:  •  acetaminophen **OR** acetaminophen  •  benzonatate  •  dextromethorphan polistirex ER  •  labetalol  •  ondansetron **OR** ondansetron  •  sodium chloride  •  sodium chloride    Review of Systems See HPI  Vital Signs:  /67   Pulse 70   Temp 98.8 °F (37.1 °C)   Resp 18   Ht 162.6 cm (64.02\")   Wt 99.2 kg (218 " lb 11.1 oz)   SpO2 95%   BMI 37.52 kg/m²     Physical Exam  Vital signs - reviewed.  Line/IV site - No erythema, warmth, induration, or tenderness.  He is without wheezing  Extremities without significant edema    Lab Results:  CBC:   Results from last 7 days   Lab Units 02/10/21  0241 02/09/21  0542 02/08/21  0554 02/07/21  0629 02/06/21  0456 02/05/21 0445 02/04/21  0411   WBC 10*3/mm3 10.29 10.11 14.38* 16.47* 14.46* 8.80 9.91   HEMOGLOBIN g/dL 15.4 14.8 14.8 16.6 16.2 15.7 15.1   HEMATOCRIT % 46.2 44.7 41.9 47.2 46.4 44.9 43.5   PLATELETS 10*3/mm3 341 332 305 355 295 256 230     BMP:  Results from last 7 days   Lab Units 02/10/21  0241 02/09/21  0542 02/08/21  0554 02/07/21  0629 02/06/21  0456 02/05/21 0445 02/04/21  0411   SODIUM mmol/L 137 136 133* 138 139 138 137   POTASSIUM mmol/L 4.7 4.7 4.4 4.2 4.1 4.0 3.8   CHLORIDE mmol/L 100 100 100 99 97* 98 100   CO2 mmol/L 31.0* 30.0* 30.0* 30.0* 34.0* 30.0* 28.0   BUN mg/dL 22 26* 26* 31* 40* 35* 32*   CREATININE mg/dL 0.71* 0.67* 0.64* 0.72* 0.88 0.71* 0.68*   GLUCOSE mg/dL 127* 118* 116* 125* 121* 126* 113*   CALCIUM mg/dL 8.4* 8.3* 8.2* 8.7 8.7 8.9 8.6   ALT (SGPT) U/L 24 22 20 21 19 16 18     Culture Results:   Respiratory Culture   Date Value Ref Range Status   02/05/2021   Final    Light growth (2+) Normal Respiratory Jazz: NO S.aureus/MRSA or Pseudomonas aeruginosa     Radiology: None  Additional Studies Reviewed: None    Impression:   COVID-19 infection  Respiratory failure secondary COVID-19-symptomatically he seems to be starting to show some improvement  Diastolic dysfunction  Hypertension    Recommendations:   Would complete 7-day course of meropenem  Continue twice daily dexamethasone-once oxygen requirement start to improve would suggest decreasing dexamethasone to 6 mg daily  Encouraging incentive spirometry  Hopefully oxygen can start to be weaned    Ronni Mattson MD

## 2021-02-11 LAB
ALBUMIN SERPL-MCNC: 3 G/DL (ref 3.5–5.2)
ALBUMIN/GLOB SERPL: 1.2 G/DL
ALP SERPL-CCNC: 58 U/L (ref 39–117)
ALT SERPL W P-5'-P-CCNC: 24 U/L (ref 1–41)
ANION GAP SERPL CALCULATED.3IONS-SCNC: 8 MMOL/L (ref 5–15)
AST SERPL-CCNC: 18 U/L (ref 1–40)
BASOPHILS # BLD AUTO: 0.02 10*3/MM3 (ref 0–0.2)
BASOPHILS NFR BLD AUTO: 0.2 % (ref 0–1.5)
BILIRUB SERPL-MCNC: 0.6 MG/DL (ref 0–1.2)
BUN SERPL-MCNC: 23 MG/DL (ref 8–23)
BUN/CREAT SERPL: 39.7 (ref 7–25)
CALCIUM SPEC-SCNC: 8.5 MG/DL (ref 8.6–10.5)
CHLORIDE SERPL-SCNC: 100 MMOL/L (ref 98–107)
CO2 SERPL-SCNC: 28 MMOL/L (ref 22–29)
CREAT SERPL-MCNC: 0.58 MG/DL (ref 0.76–1.27)
D DIMER PPP FEU-MCNC: 1.16 MG/L (FEU) (ref 0–0.5)
DEPRECATED RDW RBC AUTO: 42.6 FL (ref 37–54)
EOSINOPHIL # BLD AUTO: 0.01 10*3/MM3 (ref 0–0.4)
EOSINOPHIL NFR BLD AUTO: 0.1 % (ref 0.3–6.2)
ERYTHROCYTE [DISTWIDTH] IN BLOOD BY AUTOMATED COUNT: 13.2 % (ref 12.3–15.4)
FIBRINOGEN PPP-MCNC: 505 MG/DL (ref 240–460)
GFR SERPL CREATININE-BSD FRML MDRD: 138 ML/MIN/1.73
GLOBULIN UR ELPH-MCNC: 2.6 GM/DL
GLUCOSE SERPL-MCNC: 97 MG/DL (ref 65–99)
HCT VFR BLD AUTO: 43 % (ref 37.5–51)
HGB BLD-MCNC: 15.5 G/DL (ref 13–17.7)
IMM GRANULOCYTES # BLD AUTO: 0.12 10*3/MM3 (ref 0–0.05)
IMM GRANULOCYTES NFR BLD AUTO: 1 % (ref 0–0.5)
LDH SERPL-CCNC: 329 U/L (ref 135–225)
LYMPHOCYTES # BLD AUTO: 0.64 10*3/MM3 (ref 0.7–3.1)
LYMPHOCYTES NFR BLD AUTO: 5.3 % (ref 19.6–45.3)
MCH RBC QN AUTO: 31.8 PG (ref 26.6–33)
MCHC RBC AUTO-ENTMCNC: 36 G/DL (ref 31.5–35.7)
MCV RBC AUTO: 88.1 FL (ref 79–97)
MONOCYTES # BLD AUTO: 0.58 10*3/MM3 (ref 0.1–0.9)
MONOCYTES NFR BLD AUTO: 4.8 % (ref 5–12)
NEUTROPHILS NFR BLD AUTO: 10.81 10*3/MM3 (ref 1.7–7)
NEUTROPHILS NFR BLD AUTO: 88.6 % (ref 42.7–76)
NRBC BLD AUTO-RTO: 0 /100 WBC (ref 0–0.2)
PLATELET # BLD AUTO: 346 10*3/MM3 (ref 140–450)
PMV BLD AUTO: 9.6 FL (ref 6–12)
POTASSIUM SERPL-SCNC: 4.4 MMOL/L (ref 3.5–5.2)
PROT SERPL-MCNC: 5.6 G/DL (ref 6–8.5)
RBC # BLD AUTO: 4.88 10*6/MM3 (ref 4.14–5.8)
SODIUM SERPL-SCNC: 136 MMOL/L (ref 136–145)
WBC # BLD AUTO: 12.18 10*3/MM3 (ref 3.4–10.8)

## 2021-02-11 PROCEDURE — 63710000001 DEXAMETHASONE PER 0.25 MG: Performed by: NURSE PRACTITIONER

## 2021-02-11 PROCEDURE — 94799 UNLISTED PULMONARY SVC/PX: CPT

## 2021-02-11 PROCEDURE — 94660 CPAP INITIATION&MGMT: CPT

## 2021-02-11 PROCEDURE — 83615 LACTATE (LD) (LDH) ENZYME: CPT | Performed by: INTERNAL MEDICINE

## 2021-02-11 PROCEDURE — 25010000002 ENOXAPARIN PER 10 MG: Performed by: INTERNAL MEDICINE

## 2021-02-11 PROCEDURE — 99232 SBSQ HOSP IP/OBS MODERATE 35: CPT | Performed by: INTERNAL MEDICINE

## 2021-02-11 PROCEDURE — 85025 COMPLETE CBC W/AUTO DIFF WBC: CPT | Performed by: INTERNAL MEDICINE

## 2021-02-11 PROCEDURE — 80053 COMPREHEN METABOLIC PANEL: CPT | Performed by: INTERNAL MEDICINE

## 2021-02-11 PROCEDURE — 85379 FIBRIN DEGRADATION QUANT: CPT | Performed by: INTERNAL MEDICINE

## 2021-02-11 PROCEDURE — 25010000002 MEROPENEM PER 100 MG: Performed by: INTERNAL MEDICINE

## 2021-02-11 PROCEDURE — 85384 FIBRINOGEN ACTIVITY: CPT | Performed by: INTERNAL MEDICINE

## 2021-02-11 RX ORDER — DEXAMETHASONE SODIUM PHOSPHATE 4 MG/ML
6 INJECTION, SOLUTION INTRA-ARTICULAR; INTRALESIONAL; INTRAMUSCULAR; INTRAVENOUS; SOFT TISSUE EVERY 12 HOURS SCHEDULED
Status: DISCONTINUED | OUTPATIENT
Start: 2021-02-11 | End: 2021-02-11

## 2021-02-11 RX ADMIN — MEROPENEM 1 G: 1 INJECTION, POWDER, FOR SOLUTION INTRAVENOUS at 02:28

## 2021-02-11 RX ADMIN — OXYCODONE HYDROCHLORIDE AND ACETAMINOPHEN 500 MG: 500 TABLET ORAL at 08:26

## 2021-02-11 RX ADMIN — Medication 3 MG: at 20:52

## 2021-02-11 RX ADMIN — ALBUTEROL SULFATE 2 PUFF: 90 AEROSOL, METERED RESPIRATORY (INHALATION) at 18:20

## 2021-02-11 RX ADMIN — FAMOTIDINE 20 MG: 20 TABLET, FILM COATED ORAL at 16:47

## 2021-02-11 RX ADMIN — ALBUTEROL SULFATE 2 PUFF: 90 AEROSOL, METERED RESPIRATORY (INHALATION) at 06:25

## 2021-02-11 RX ADMIN — Medication 1000 UNITS: at 08:26

## 2021-02-11 RX ADMIN — Medication 15 ML: at 08:26

## 2021-02-11 RX ADMIN — HYDROCHLOROTHIAZIDE 25 MG: 25 TABLET ORAL at 08:26

## 2021-02-11 RX ADMIN — MEROPENEM 1 G: 1 INJECTION, POWDER, FOR SOLUTION INTRAVENOUS at 08:26

## 2021-02-11 RX ADMIN — POLYETHYLENE GLYCOL (3350) 17 G: 17 POWDER, FOR SOLUTION ORAL at 08:26

## 2021-02-11 RX ADMIN — AMLODIPINE BESYLATE 5 MG: 5 TABLET ORAL at 08:26

## 2021-02-11 RX ADMIN — ALBUTEROL SULFATE 2 PUFF: 90 AEROSOL, METERED RESPIRATORY (INHALATION) at 10:13

## 2021-02-11 RX ADMIN — VERAPAMIL HYDROCHLORIDE 180 MG: 180 TABLET, FILM COATED, EXTENDED RELEASE ORAL at 08:26

## 2021-02-11 RX ADMIN — ATORVASTATIN CALCIUM 10 MG: 10 TABLET, FILM COATED ORAL at 20:52

## 2021-02-11 RX ADMIN — ALBUTEROL SULFATE 2 PUFF: 90 AEROSOL, METERED RESPIRATORY (INHALATION) at 14:00

## 2021-02-11 RX ADMIN — OXYCODONE HYDROCHLORIDE AND ACETAMINOPHEN 500 MG: 500 TABLET ORAL at 20:51

## 2021-02-11 RX ADMIN — ASPIRIN 81 MG: 81 TABLET, CHEWABLE ORAL at 12:01

## 2021-02-11 RX ADMIN — FAMOTIDINE 20 MG: 20 TABLET, FILM COATED ORAL at 08:26

## 2021-02-11 RX ADMIN — DEXAMETHASONE 6 MG: 4 TABLET ORAL at 08:26

## 2021-02-11 RX ADMIN — COLCHICINE 0.6 MG: 0.6 TABLET, FILM COATED ORAL at 08:26

## 2021-02-11 RX ADMIN — ENOXAPARIN SODIUM 40 MG: 40 INJECTION SUBCUTANEOUS at 08:26

## 2021-02-11 RX ADMIN — ZINC SULFATE 220 MG (50 MG) CAPSULE 220 MG: CAPSULE at 08:26

## 2021-02-11 RX ADMIN — SODIUM CHLORIDE, PRESERVATIVE FREE 10 ML: 5 INJECTION INTRAVENOUS at 20:52

## 2021-02-11 RX ADMIN — DOCUSATE SODIUM 100 MG: 100 CAPSULE ORAL at 08:26

## 2021-02-11 NOTE — PROGRESS NOTES
"Infectious Diseases Progress Note    Patient:  Santiago Spence  YOB: 1950  MRN: [id number]   Admit date: 2/2/2021   Admitting Physician: Jose Russell MD  Primary Care Physician: Meredith Lloyd APRN    Chief Complaint/Interval History: He continues to feel better.  He feels exercise tolerance is improving.  We are starting to see more definitive objective evidence of improvement.  FiO2 requirement starting to decrease.  No productive cough.  He is now on Vapotherm at 30 L and 70% with a 96% oxygen saturation whereas yesterday he had remained on 40 L and 100%.    Intake/Output Summary (Last 24 hours) at 2/11/2021 1340  Last data filed at 2/11/2021 0546  Gross per 24 hour   Intake 100 ml   Output 950 ml   Net -850 ml     Allergies: No Known Allergies  Current Scheduled Medications:   albuterol sulfate HFA, 2 puff, Inhalation, 4x Daily - RT  amLODIPine, 5 mg, Oral, Q24H  ascorbic acid, 500 mg, Oral, BID  aspirin, 81 mg, Oral, Daily  atorvastatin, 10 mg, Oral, Nightly  cholecalciferol, 1,000 Units, Oral, Daily  colchicine, 0.6 mg, Oral, Daily  dexamethasone, 6 mg, Oral, Q12H    Or  dexamethasone, 6 mg, Intravenous, Q12H  docusate sodium, 100 mg, Oral, Daily  enoxaparin, 40 mg, Subcutaneous, Q24H  famotidine, 20 mg, Oral, BID AC  hydroCHLOROthiazide, 25 mg, Oral, Daily  melatonin, 3 mg, Oral, Nightly  meropenem, 1 g, Intravenous, Q8H  multivitamin and minerals, 15 mL, Oral, Daily  polyethylene glycol, 17 g, Oral, Daily  sodium chloride, 10 mL, Intravenous, Q12H  verapamil SR, 180 mg, Oral, Q24H  zinc sulfate, 220 mg, Oral, Daily      Current PRN Medications:  •  acetaminophen **OR** acetaminophen  •  benzonatate  •  dextromethorphan polistirex ER  •  labetalol  •  ondansetron **OR** ondansetron  •  sodium chloride  •  sodium chloride    Review of Systems see HPI    Vital Signs:  /67   Pulse 107   Temp 97.6 °F (36.4 °C)   Resp 20   Ht 162.6 cm (64.02\")   Wt 100 kg (220 lb 9.6 oz)  "  SpO2 91%   BMI 37.85 kg/m²     Physical Exam  Vital signs - reviewed.  He is up to chair.  He looks comfortable.  He is alert and interactive.  He does not appear to be in any respiratory distress    Lab Results:  CBC:   Results from last 7 days   Lab Units 02/11/21  0547 02/10/21  0241 02/09/21  0542 02/08/21  0554 02/07/21  0629 02/06/21  0456 02/05/21  0445   WBC 10*3/mm3 12.18* 10.29 10.11 14.38* 16.47* 14.46* 8.80   HEMOGLOBIN g/dL 15.5 15.4 14.8 14.8 16.6 16.2 15.7   HEMATOCRIT % 43.0 46.2 44.7 41.9 47.2 46.4 44.9   PLATELETS 10*3/mm3 346 341 332 305 355 295 256     BMP:  Results from last 7 days   Lab Units 02/11/21  0547 02/10/21  0241 02/09/21  0542 02/08/21  0554 02/07/21  0629 02/06/21  0456 02/05/21  0445   SODIUM mmol/L 136 137 136 133* 138 139 138   POTASSIUM mmol/L 4.4 4.7 4.7 4.4 4.2 4.1 4.0   CHLORIDE mmol/L 100 100 100 100 99 97* 98   CO2 mmol/L 28.0 31.0* 30.0* 30.0* 30.0* 34.0* 30.0*   BUN mg/dL 23 22 26* 26* 31* 40* 35*   CREATININE mg/dL 0.58* 0.71* 0.67* 0.64* 0.72* 0.88 0.71*   GLUCOSE mg/dL 97 127* 118* 116* 125* 121* 126*   CALCIUM mg/dL 8.5* 8.4* 8.3* 8.2* 8.7 8.7 8.9   ALT (SGPT) U/L 24 24 22 20 21 19 16     Culture Results:   Respiratory Culture   Date Value Ref Range Status   02/05/2021   Final    Light growth (2+) Normal Respiratory Jazz: NO S.aureus/MRSA or Pseudomonas aeruginosa     Radiology: None  Additional Studies Reviewed: None    Impression:   COVID-19 infection  Respiratory failure secondary to COVID-19-improving  Diastolic dysfunction  Hypertension    Recommendations:   He has completed adequate course of meropenem.  We will discontinue antibiotic treatment.  Suggest decreasing dexamethasone to 6 mg daily  Continue to encourage incentive spirometry  Wean oxygen as tolerated  Suggest beginning physical therapy    Ronni Mattson MD

## 2021-02-11 NOTE — PLAN OF CARE
Goal Outcome Evaluation:  Plan of Care Reviewed With: patient  Progress: improving  Outcome Summary: Patient now on vapothem 35/85. Tolerating 70% Bipap at HS.

## 2021-02-11 NOTE — PROGRESS NOTES
PULMONARY AND CRITICAL CARE PROGRESS NOTE - [facility]    Patient: Santiago Spence  1950   MR# [id number]   Acct# [id number]  02/11/21   09:00 CST  Referring Provider: Jose Russell MD    Chief Complaint: Covid-19 pneumonia, acute respiratory failure    Interval history:   Patient was seen in the intensive care unit in [facility] isolation from outside the glass door to reduce the risk of cross infection exposure and to minimize use of PPE.  The patient is sitting up in the chair at bedside.  Current saturation 95 on Vapotherm 35L and FiO2 85.  Pulse 70.  He appears awake and alert and comfortable.  No reported issues overnight.      Meds:  albuterol sulfate HFA, 2 puff, Inhalation, 4x Daily - RT  amLODIPine, 5 mg, Oral, Q24H  ascorbic acid, 500 mg, Oral, BID  aspirin, 81 mg, Oral, Daily  atorvastatin, 10 mg, Oral, Nightly  cholecalciferol, 1,000 Units, Oral, Daily  colchicine, 0.6 mg, Oral, Daily  dexamethasone, 6 mg, Oral, Q12H    Or  dexamethasone, 6 mg, Intravenous, Q12H  docusate sodium, 100 mg, Oral, Daily  enoxaparin, 40 mg, Subcutaneous, Q24H  famotidine, 20 mg, Oral, BID AC  hydroCHLOROthiazide, 25 mg, Oral, Daily  melatonin, 3 mg, Oral, Nightly  meropenem, 1 g, Intravenous, Q8H  multivitamin and minerals, 15 mL, Oral, Daily  polyethylene glycol, 17 g, Oral, Daily  sodium chloride, 10 mL, Intravenous, Q12H  verapamil SR, 180 mg, Oral, Q24H  zinc sulfate, 220 mg, Oral, Daily         Review of Systems:   Review of Systems   Constitutional: Negative for chills and fever.   Respiratory: Positive for shortness of breath.    Cardiovascular: Negative for chest pain.   Gastrointestinal: Negative for diarrhea, nausea and vomiting.         Physical Exam:  SpO2 Percentage    02/11/21 0630 02/11/21 0700 02/11/21 0730   SpO2: 93% 97% 96%     Temp:  [96.6 °F (35.9 °C)-98.8 °F (37.1 °C)] 96.6 °F (35.9 °C)  Heart Rate:  [56-82] 82  Resp:  [3-20] 20  BP: (100-152)/(45-99) 121/60    Intake/Output  Summary (Last 24 hours) at 2/11/2021 0900  Last data filed at 2/11/2021 0546  Gross per 24 hour   Intake 100 ml   Output 1250 ml   Net -1150 ml     Based on nursing assessment.    GENERAL/CONSTITUTIONAL: No distress.   HEENT: atraumatic, normocephalic  NOSE: normal, vapotherm  NECK: jugular veins nondistended  CHEST: no paradox, no retractions.  No respiratory distress.   CARDIAC: NSR  ABDOMEN: nondistended  : Deferred  EXTREMITIES: Mild lower extremity edema.  NEURO: Awake and alert, waving  SKIN: no jaundice.  No rash    Laboratory Data:  Results from last 7 days   Lab Units 02/11/21  0547 02/10/21  0241 02/09/21  0542   WBC 10*3/mm3 12.18* 10.29 10.11   HEMOGLOBIN g/dL 15.5 15.4 14.8   PLATELETS 10*3/mm3 346 341 332     Results from last 7 days   Lab Units 02/11/21  0547 02/10/21  0241 02/09/21  0542 02/08/21  0554   SODIUM mmol/L 136 137 136 133*   POTASSIUM mmol/L 4.4 4.7 4.7 4.4   BUN mg/dL 23 22 26* 26*   CREATININE mg/dL 0.58* 0.71* 0.67* 0.64*   CRP mg/dL  --  0.24  --  0.57*   FERRITIN ng/mL  --  543.50*  --  490.90*   D DIMER QUANT mg/L (FEU) 1.16*  --  0.85*  --      Results from last 7 days   Lab Units 02/06/21  0325 02/05/21  0455   PH, ARTERIAL pH units 7.453* 7.438   PCO2, ARTERIAL mm Hg 47.8* 47.3*   PO2 ART mm Hg 98.8 70.3*   FIO2 % 90 100     Blood Culture   Date Value Ref Range Status   02/02/2021 No growth at 2 days  Preliminary   02/02/2021 No growth at 2 days  Preliminary     Respiratory Culture   Date Value Ref Range Status   02/02/2021   Preliminary    Heavy growth (4+) Normal Respiratory Jazz: NO S.aureus/MRSA or Pseudomonas aeruginosa     Recent films:  No radiology results for the last day  Films reviewed personally by me.  My interpretation: None today    Pulmonary Assessment:    1. SARS Covid-19 viral pneumonia -remdesivir completed  2. Acute respiratory failure with hypoxia related to COVID-19  3. Bilateral lung infiltrates  4. History of colon cancer  5. Obese  6. Obstructive sleep  apnea, does not use his CPAP    Recommend:     · Continue supplemental oxygen for O2 sat 90% to 94%, currently on Vapotherm 35+85 with a saturation 95  · Continue Vapotherm and BiPAP as needed  · Proning candidate: Yes if able to coordinate  · Resumed decadron to 6mg IV and/or PO to BID per ID recommendations. Day 8 of 10  · DVT prophylaxis-Lovenox 40 mg daily  · Stress ulcer prophylaxis-Pepcid twice daily  · Zinc, vitamin D, vitamin C, and melatonin  · Antibiotic-Merrem complete 7 day course per ID  · IS, HFA bronchodilators  · Nutrition  · Mobilization   · Prognosis guarded    Electronically signed by: VALERIA Lowry 2/11/2021 09:00 CST     Physician substantive portion:  Patient indicates no new problems.  He remains on high flow oxygen.  In no distress.  No paradox.  No jaundice.  He is awake and alert.  Plan continue oxygen, self proning.  Continue Decadron, currently still on high-dose.  We will monitor this day-to-day.  Continue out of bed as tolerated.    I have seen and examined patient personally, performing a face-to-face diagnostic evaluation with plan of care reviewed and developed with APRN and nursing staff. I have addended and/or modified the above history of present illness, physical examination, and assessment and plan to reflect my findings and impressions. Essential elements of the care plan were discussed with APRN above.  Agree with findings and assessment/plan as documented above.    Electronically signed by Adam Eugene MD, on 2/11/2021, 11:42 CST

## 2021-02-11 NOTE — PROGRESS NOTES
[facility] Medicine Services  INPATIENT PROGRESS NOTE    Patient Name: Santiago Spence  Date of Admission: 2/2/2021  Today's Date: 02/11/21  Length of Stay: 9  Primary Care Physician: Meredith Lloyd APRN    Subjective   Chief Complaint: SOA  HPI   Doing ok.  Afebrile.  Less SOA, oxygen requirements coming down      Review of Systems   Constitutional: Positive for fatigue. Negative for fever.   HENT: Negative for congestion and ear pain.    Eyes: Negative for redness and visual disturbance.   Respiratory: Positive for cough and shortness of breath. Negative for wheezing.    Cardiovascular: Negative for chest pain and palpitations.   Gastrointestinal: Negative for abdominal pain, diarrhea, nausea and vomiting.   Endocrine: Negative for cold intolerance and heat intolerance.   Genitourinary: Negative for dysuria and frequency.   Musculoskeletal: Negative for arthralgias and back pain.   Skin: Negative for rash and wound.   Neurological: Negative for dizziness and headaches.   Psychiatric/Behavioral: Negative for confusion. The patient is not nervous/anxious.         All pertinent negatives and positives are as above. All other systems have been reviewed and are negative unless otherwise stated.     Objective    Temp:  [96.6 °F (35.9 °C)-98.8 °F (37.1 °C)] 96.6 °F (35.9 °C)  Heart Rate:  [56-82] 82  Resp:  [3-20] 20  BP: (100-152)/(45-99) 121/60  Physical Exam  Vitals signs reviewed.   Constitutional:       Appearance: He is well-developed.   HENT:      Head: Normocephalic and atraumatic.      Right Ear: External ear normal.      Left Ear: External ear normal.      Nose: Nose normal.   Eyes:      General: No scleral icterus.        Right eye: No discharge.         Left eye: No discharge.      Conjunctiva/sclera: Conjunctivae normal.      Pupils: Pupils are equal, round, and reactive to light.   Neck:      Musculoskeletal: Normal range of motion and neck supple.      Thyroid: No  thyromegaly.      Trachea: No tracheal deviation.   Cardiovascular:      Rate and Rhythm: Normal rate and regular rhythm.      Heart sounds: Normal heart sounds. No murmur. No friction rub. No gallop.    Pulmonary:      Effort: Pulmonary effort is normal. No respiratory distress.      Breath sounds: No stridor. Decreased breath sounds and rhonchi present. No wheezing or rales.   Chest:      Chest wall: No tenderness.   Abdominal:      General: Bowel sounds are normal. There is no distension.      Palpations: Abdomen is soft. There is no mass.      Tenderness: There is no abdominal tenderness. There is no guarding or rebound.      Hernia: No hernia is present.   Musculoskeletal: Normal range of motion.         General: No deformity.   Lymphadenopathy:      Cervical: No cervical adenopathy.   Skin:     General: Skin is warm and dry.      Coloration: Skin is not pale.      Findings: No erythema or rash.   Neurological:      Mental Status: He is alert and oriented to person, place, and time.      Cranial Nerves: No cranial nerve deficit.      Motor: No abnormal muscle tone.      Coordination: Coordination normal.      Deep Tendon Reflexes: Reflexes are normal and symmetric. Reflexes normal.   Psychiatric:         Behavior: Behavior normal.         Thought Content: Thought content normal.         Judgment: Judgment normal.           Results Review:  I have reviewed the labs, radiology results, and diagnostic studies.    Laboratory Data:   Results from last 7 days   Lab Units 02/11/21  0547 02/10/21  0241 02/09/21  0542   WBC 10*3/mm3 12.18* 10.29 10.11   HEMOGLOBIN g/dL 15.5 15.4 14.8   HEMATOCRIT % 43.0 46.2 44.7   PLATELETS 10*3/mm3 346 341 332        Results from last 7 days   Lab Units 02/11/21  0547 02/10/21  0241 02/09/21  0542   SODIUM mmol/L 136 137 136   POTASSIUM mmol/L 4.4 4.7 4.7   CHLORIDE mmol/L 100 100 100   CO2 mmol/L 28.0 31.0* 30.0*   BUN mg/dL 23 22 26*   CREATININE mg/dL 0.58* 0.71* 0.67*   CALCIUM  mg/dL 8.5* 8.4* 8.3*   BILIRUBIN mg/dL 0.6 0.5 0.5   ALK PHOS U/L 58 62 62   ALT (SGPT) U/L 24 24 22   AST (SGOT) U/L 18 20 26   GLUCOSE mg/dL 97 127* 118*       Culture Data:   Blood Culture   Date Value Ref Range Status   02/02/2021 No growth at 5 days  Final   02/02/2021 No growth at 5 days  Final     Respiratory Culture   Date Value Ref Range Status   02/05/2021   Final    Light growth (2+) Normal Respiratory Jazz: NO S.aureus/MRSA or Pseudomonas aeruginosa   02/02/2021   Final    Heavy growth (4+) Normal Respiratory Jazz: NO S.aureus/MRSA or Pseudomonas aeruginosa       Radiology Data:   Imaging Results (Last 24 Hours)     ** No results found for the last 24 hours. **          I have reviewed the patient's current medications.     Assessment/Plan     Active Hospital Problems    Diagnosis   • Obesity (BMI 30-39.9)   • Pneumonia due to COVID-19 virus   • Acute respiratory failure with hypoxia (CMS/HCC)   • Essential hypertension     Labs reviewed:  Leukocytosis    Telemetry reviewed    Telemetry independently interpreted by me: NSR    Mr. Spence is a 70 year old gentleman with a history of HTN admitted with Acute respiratory failure with hypoxia secondary to covid-19 pneumonia.    1.  Acute respiratory failure with hypoxia  -Decadron day #10  -Completed Remdesivir  -Pulmonology following   -ID following  -Merem    2.  COVID-19 pneumonia  -Decadron day #10  -Completed Remdesivir  -Pulmonology following   -ID following  -Merem  -Zinc  -Statin    3.  HTN  -HCTZ  -Norvasc    4.  Obesity-BMI 37.9  -Dietician consult.    DVT PPX:  Lovenox      Discharge Planning: I expect the patient to be discharged to home in ? days  Electronically signed by Jose Russell MD, 02/11/21, 09:35 CST.

## 2021-02-11 NOTE — PROGRESS NOTES
Continued Stay Note   [facility]     Patient Name: Santiago Spence  MRN: [id number]  Today's Date: 2/11/2021    Admit Date: 2/2/2021    Discharge Plan     Row Name 02/11/21 0957       Plan    Plan  LTAC?/unclear    Plan Comments  Patient remains in ICU on vapotherm.  Physician's note indicates patient's respiratory status is improving.  Patient may possibly benefit from LTAC placement?  Recommend therapy evaluations when medically appropriate.  SW will continue to follow to determine plan/needs.        Discharge Codes    No documentation.             VIPUL Nava

## 2021-02-12 LAB
ALBUMIN SERPL-MCNC: 2.7 G/DL (ref 3.5–5.2)
ALBUMIN/GLOB SERPL: 1 G/DL
ALP SERPL-CCNC: 58 U/L (ref 39–117)
ALT SERPL W P-5'-P-CCNC: 24 U/L (ref 1–41)
ANION GAP SERPL CALCULATED.3IONS-SCNC: 6 MMOL/L (ref 5–15)
AST SERPL-CCNC: 17 U/L (ref 1–40)
BASOPHILS # BLD AUTO: 0.02 10*3/MM3 (ref 0–0.2)
BASOPHILS NFR BLD AUTO: 0.2 % (ref 0–1.5)
BILIRUB SERPL-MCNC: 0.7 MG/DL (ref 0–1.2)
BUN SERPL-MCNC: 22 MG/DL (ref 8–23)
BUN/CREAT SERPL: 36.1 (ref 7–25)
CALCIUM SPEC-SCNC: 8.3 MG/DL (ref 8.6–10.5)
CHLORIDE SERPL-SCNC: 101 MMOL/L (ref 98–107)
CO2 SERPL-SCNC: 30 MMOL/L (ref 22–29)
CREAT SERPL-MCNC: 0.61 MG/DL (ref 0.76–1.27)
DEPRECATED RDW RBC AUTO: 42.6 FL (ref 37–54)
EOSINOPHIL # BLD AUTO: 0.01 10*3/MM3 (ref 0–0.4)
EOSINOPHIL NFR BLD AUTO: 0.1 % (ref 0.3–6.2)
ERYTHROCYTE [DISTWIDTH] IN BLOOD BY AUTOMATED COUNT: 13.2 % (ref 12.3–15.4)
GFR SERPL CREATININE-BSD FRML MDRD: 130 ML/MIN/1.73
GLOBULIN UR ELPH-MCNC: 2.8 GM/DL
GLUCOSE SERPL-MCNC: 85 MG/DL (ref 65–99)
HCT VFR BLD AUTO: 42.7 % (ref 37.5–51)
HGB BLD-MCNC: 15.1 G/DL (ref 13–17.7)
IMM GRANULOCYTES # BLD AUTO: 0.11 10*3/MM3 (ref 0–0.05)
IMM GRANULOCYTES NFR BLD AUTO: 1.1 % (ref 0–0.5)
LYMPHOCYTES # BLD AUTO: 0.67 10*3/MM3 (ref 0.7–3.1)
LYMPHOCYTES NFR BLD AUTO: 6.6 % (ref 19.6–45.3)
MCH RBC QN AUTO: 31.2 PG (ref 26.6–33)
MCHC RBC AUTO-ENTMCNC: 35.4 G/DL (ref 31.5–35.7)
MCV RBC AUTO: 88.2 FL (ref 79–97)
MONOCYTES # BLD AUTO: 0.61 10*3/MM3 (ref 0.1–0.9)
MONOCYTES NFR BLD AUTO: 6 % (ref 5–12)
NEUTROPHILS NFR BLD AUTO: 8.78 10*3/MM3 (ref 1.7–7)
NEUTROPHILS NFR BLD AUTO: 86 % (ref 42.7–76)
NRBC BLD AUTO-RTO: 0 /100 WBC (ref 0–0.2)
PLATELET # BLD AUTO: 326 10*3/MM3 (ref 140–450)
PMV BLD AUTO: 9.8 FL (ref 6–12)
POTASSIUM SERPL-SCNC: 4.4 MMOL/L (ref 3.5–5.2)
PROT SERPL-MCNC: 5.5 G/DL (ref 6–8.5)
RBC # BLD AUTO: 4.84 10*6/MM3 (ref 4.14–5.8)
SODIUM SERPL-SCNC: 137 MMOL/L (ref 136–145)
WBC # BLD AUTO: 10.2 10*3/MM3 (ref 3.4–10.8)

## 2021-02-12 PROCEDURE — 25010000002 ENOXAPARIN PER 10 MG: Performed by: INTERNAL MEDICINE

## 2021-02-12 PROCEDURE — 94799 UNLISTED PULMONARY SVC/PX: CPT

## 2021-02-12 PROCEDURE — 99232 SBSQ HOSP IP/OBS MODERATE 35: CPT | Performed by: INTERNAL MEDICINE

## 2021-02-12 PROCEDURE — 80053 COMPREHEN METABOLIC PANEL: CPT | Performed by: INTERNAL MEDICINE

## 2021-02-12 PROCEDURE — 85025 COMPLETE CBC W/AUTO DIFF WBC: CPT | Performed by: INTERNAL MEDICINE

## 2021-02-12 PROCEDURE — 63710000001 DEXAMETHASONE PER 0.25 MG: Performed by: INTERNAL MEDICINE

## 2021-02-12 PROCEDURE — 97162 PT EVAL MOD COMPLEX 30 MIN: CPT

## 2021-02-12 RX ADMIN — FAMOTIDINE 20 MG: 20 TABLET, FILM COATED ORAL at 17:33

## 2021-02-12 RX ADMIN — SODIUM CHLORIDE, PRESERVATIVE FREE 10 ML: 5 INJECTION INTRAVENOUS at 21:35

## 2021-02-12 RX ADMIN — VERAPAMIL HYDROCHLORIDE 180 MG: 180 TABLET, FILM COATED, EXTENDED RELEASE ORAL at 08:09

## 2021-02-12 RX ADMIN — ALBUTEROL SULFATE 2 PUFF: 90 AEROSOL, METERED RESPIRATORY (INHALATION) at 14:38

## 2021-02-12 RX ADMIN — DOCUSATE SODIUM 100 MG: 100 CAPSULE ORAL at 08:08

## 2021-02-12 RX ADMIN — ZINC SULFATE 220 MG (50 MG) CAPSULE 220 MG: CAPSULE at 08:08

## 2021-02-12 RX ADMIN — COLCHICINE 0.6 MG: 0.6 TABLET, FILM COATED ORAL at 08:08

## 2021-02-12 RX ADMIN — Medication 1000 UNITS: at 08:08

## 2021-02-12 RX ADMIN — Medication 15 ML: at 08:08

## 2021-02-12 RX ADMIN — ATORVASTATIN CALCIUM 10 MG: 10 TABLET, FILM COATED ORAL at 21:34

## 2021-02-12 RX ADMIN — POLYETHYLENE GLYCOL (3350) 17 G: 17 POWDER, FOR SOLUTION ORAL at 08:07

## 2021-02-12 RX ADMIN — AMLODIPINE BESYLATE 5 MG: 5 TABLET ORAL at 08:08

## 2021-02-12 RX ADMIN — ENOXAPARIN SODIUM 40 MG: 40 INJECTION SUBCUTANEOUS at 08:07

## 2021-02-12 RX ADMIN — Medication 3 MG: at 21:34

## 2021-02-12 RX ADMIN — ALBUTEROL SULFATE 2 PUFF: 90 AEROSOL, METERED RESPIRATORY (INHALATION) at 10:10

## 2021-02-12 RX ADMIN — ALBUTEROL SULFATE 2 PUFF: 90 AEROSOL, METERED RESPIRATORY (INHALATION) at 06:21

## 2021-02-12 RX ADMIN — DEXAMETHASONE 6 MG: 4 TABLET ORAL at 08:08

## 2021-02-12 RX ADMIN — OXYCODONE HYDROCHLORIDE AND ACETAMINOPHEN 500 MG: 500 TABLET ORAL at 08:08

## 2021-02-12 RX ADMIN — SODIUM CHLORIDE, PRESERVATIVE FREE 10 ML: 5 INJECTION INTRAVENOUS at 08:09

## 2021-02-12 RX ADMIN — FAMOTIDINE 20 MG: 20 TABLET, FILM COATED ORAL at 08:08

## 2021-02-12 RX ADMIN — ALBUTEROL SULFATE 2 PUFF: 90 AEROSOL, METERED RESPIRATORY (INHALATION) at 21:34

## 2021-02-12 RX ADMIN — ASPIRIN 81 MG: 81 TABLET, CHEWABLE ORAL at 08:08

## 2021-02-12 RX ADMIN — OXYCODONE HYDROCHLORIDE AND ACETAMINOPHEN 500 MG: 500 TABLET ORAL at 21:34

## 2021-02-12 RX ADMIN — HYDROCHLOROTHIAZIDE 25 MG: 25 TABLET ORAL at 08:08

## 2021-02-12 NOTE — THERAPY EVALUATION
Patient Name: Santiago Spence  : 1950    MRN: [id number]                              Today's Date: 2021       Admit Date: 2021    Visit Dx:     ICD-10-CM ICD-9-CM   1. Impaired mobility  Z74.09 799.89     Patient Active Problem List   Diagnosis   • Essential hypertension   • Hypokalemia   • Colon cancer (CMS/HCC)   • Pneumonia due to COVID-19 virus   • Acute respiratory failure with hypoxia (CMS/HCC)   • Obesity (BMI 30-39.9)     Past Medical History:   Diagnosis Date   • Arthritis    • Cancer (CMS/HCC)    • Colon cancer (CMS/HCC)    • Hypertension    • Skin cancer      Past Surgical History:   Procedure Laterality Date   • COLON SURGERY     • JOINT REPLACEMENT     • SKIN CANCER EXCISION       General Information     Row Name 21 1100          Physical Therapy Time and Intention    Document Type  evaluation CC: Fever, cough, congestion. Dx: Acute resp failure w hypoxia, COVID, PNA  -MARY     Mode of Treatment  physical therapy  -MARY     Row Name 21 1100          General Information    Patient Profile Reviewed  yes  -MARY     Prior Level of Function  independent:;all household mobility;ADL's  -MARY     Existing Precautions/Restrictions  fall;oxygen therapy device and L/min Vapotherm  -MARY     Barriers to Rehab  medically complex  -MARY     Row Name 21 1100          Living Environment    Lives With  spouse  -MARY     Row Name 21 1100          Home Main Entrance    Number of Stairs, Main Entrance  none  -MARY     Row Name 21 1100          Cognition    Orientation Status (Cognition)  oriented x 4  -MARY     Row Name 21 1100          Safety Issues, Functional Mobility    Impairments Affecting Function (Mobility)  endurance/activity tolerance;strength;shortness of breath  -MARY       User Key  (r) = Recorded By, (t) = Taken By, (c) = Cosigned By    Initials Name Provider Type    Henry Russell, PT DPT Physical Therapist        Mobility     Row Name 21 1100          Bed Mobility     Comment (Bed Mobility)  in chair  -MARY     Row Name 02/12/21 1100          Sit-Stand Transfer    Sit-Stand [place] (Transfers)  contact guard  -MARY     Row Name 02/12/21 1100          Gait/Stairs (Locomotion)    [place] Level (Gait)  contact guard  -MARY     Distance in Feet (Gait)  15 ft on vapotherm  -MARY     Deviations/Abnormal Patterns (Gait)  nicolle decreased;gait speed decreased  -MARY       User Key  (r) = Recorded By, (t) = Taken By, (c) = Cosigned By    Initials Name Provider Type    Henry Russell, PT DPT Physical Therapist        Obj/Interventions     Row Name 02/12/21 1100          Range of Motion Comprehensive    Comment, General Range of Motion  B LE AROM WFL  -MARY     Row Name 02/12/21 1100          Strength Comprehensive (MMT)    Comment, General Manual Muscle Testing (MMT) Assessment  B LE grossly 4/5  -MARY     [facility] Name 02/12/21 1100          Balance    Balance Assessment  sitting static balance;standing static balance  -MARY     Static Sitting Balance  WFL;unsupported;sitting in chair  -MARY     Static Standing Balance  WFL;unsupported;standing  -MARY     Row Name 02/12/21 1100          Sensory Assessment (Somatosensory)    Sensory Assessment (Somatosensory)  sensation intact B LE intact, per pt  -MARY       User Key  (r) = Recorded By, (t) = Taken By, (c) = Cosigned By    Initials Name Provider Type    Henry Russell, PT DPT Physical Therapist        Goals/Plan     [facility] Name 02/12/21 1100          Bed Mobility Goal 1 (PT)    Activity/Assistive Device (Bed Mobility Goal 1, PT)  sit to supine/supine to sit  -MARY     [place] Level/Cues Needed (Bed Mobility Goal 1, PT)  independent  -MARY     Time Frame (Bed Mobility Goal 1, PT)  long term goal (LTG);10 days  -MARY     Progress/Outcomes (Bed Mobility Goal 1, PT)  goal ongoing  -MARY     Row Name 02/12/21 1100          Transfer Goal 1 (PT)    Activity/Assistive Device (Transfer Goal 1, PT)  sit-to-stand/stand-to-sit;bed-to-chair/chair-to-bed  -MARY      [place] Level/Cues Needed (Transfer Goal 1, PT)  independent  -MARY     Time Frame (Transfer Goal 1, PT)  long term goal (LTG);10 days  -MARY     Progress/Outcome (Transfer Goal 1, PT)  goal ongoing  -MARY     Row Name 02/12/21 1100          Gait Training Goal 1 (PT)    Activity/Assistive Device (Gait Training Goal 1, PT)  gait (walking locomotion);assistive device use;decrease fall risk;improve balance and speed;increase endurance/gait distance;increase energy conservation  -MARY     [place] Level (Gait Training Goal 1, PT)  independent  -MARY     Distance (Gait Training Goal 1, PT)  60  -MARY     Time Frame (Gait Training Goal 1, PT)  long term goal (LTG);10 days  -MARY     Progress/Outcome (Gait Training Goal 1, PT)  goal ongoing  -MARY       User Key  (r) = Recorded By, (t) = Taken By, (c) = Cosigned By    Initials Name Provider Type    Henry Russell, PT DPT Physical Therapist        Clinical Impression     Row Name 02/12/21 1100          Pain    Additional Documentation  Pain Scale: Numbers Pre/Post-Treatment (Group)  -MARY     Row Name 02/12/21 1100          Pain Scale: Numbers Pre/Post-Treatment    Pretreatment Pain Rating  0/10 - no pain  -MARY     [facility] Name 02/12/21 1100          Plan of Care Review    Plan of Care Reviewed With  patient  -MARY     Outcome Summary  PT eval completed. He presents alert and oriented x 4, on vapotherm. He reports feeling much improved since admit. He was SBA/CGA to stand and ambulate a short distance in his room on the vapotherm. He demos good strength and balance. PT will cont to work on endurance/activity tolerance in preparation of d/c home with spouse.  -MARY     Row Name 02/12/21 1100          Therapy Assessment/Plan (PT)    Patient/Family Therapy Goals Statement (PT)  go home  -MARY     Rehab Potential (PT)  good, to achieve stated therapy goals  -MARY     Criteria for Skilled Interventions Met (PT)  yes;meets criteria;skilled treatment is necessary  -MARY     Predicted Duration  of Therapy Intervention (PT)  until d.c  -MRAY     Row Name 02/12/21 1100          Vital Signs    Post SpO2 (%)  90  -MARY     O2 Delivery Post Treatment  -- vapotherm  -MARY     Pre Patient Position  Sitting  -MARY     Intra Patient Position  Standing  -MARY     Post Patient Position  Sitting  -MARY     Row Name 02/12/21 1100          Positioning and Restraints    Pre-Treatment Position  sitting in chair/recliner  -MARY     Post Treatment Position  chair  -MARY     In Chair  sitting;call light within reach;encouraged to call for assist;patient within staff view  -MARY       User Key  (r) = Recorded By, (t) = Taken By, (c) = Cosigned By    Initials Name Provider Type    Henry Russell, PT DPT Physical Therapist        Outcome Measures     Row Name 02/12/21 1100          How much help from another person do you currently need...    Turning from your back to your side while in flat bed without using bedrails?  3  -MARY     Moving from lying on back to sitting on the side of a flat bed without bedrails?  3  -MARY     Moving to and from a bed to a chair (including a wheelchair)?  3  -MARY     Standing up from a chair using your arms (e.g., wheelchair, bedside chair)?  3  -MARY     Climbing 3-5 steps with a railing?  3  -MARY     To walk in hospital room?  3  -MARY     AM-PAC 6 Clicks Score (PT)  18  -MARY     Row Name 02/12/21 1100          Functional Assessment    Outcome Measure Options  AM-PAC 6 Clicks Basic Mobility (PT)  -MARY       User Key  (r) = Recorded By, (t) = Taken By, (c) = Cosigned By    Initials Name Provider Type    Henry Russell, PT DPT Physical Therapist        Physical Therapy Education                 Title: PT OT SLP Therapies (In Progress)     Topic: Physical Therapy (In Progress)     Point: Mobility training (Done)     Learning Progress Summary           Patient Acceptance, MIGUEL KASPER DU by MARY at 2/12/2021 1100    Comment: Progression of PT POC and benefits of activity                   Point: Home exercise program (Not  Started)     Learner Progress:  Not documented in this visit.          Point: Body mechanics (Not Started)     Learner Progress:  Not documented in this visit.          Point: Precautions (Not Started)     Learner Progress:  Not documented in this visit.                      User Key     Initials Effective Dates Name Provider Type Discipline    MARY 08/02/16 -  Henry Alvarez PT DPT Physical Therapist PT              PT Recommendation and Plan  Planned Therapy Interventions (PT): bed mobility training, transfer training, gait training, balance training, home exercise program, patient/family education, postural re-education, strengthening  Plan of Care Reviewed With: patient  Outcome Summary: PT eval completed. He presents alert and oriented x 4, on vapotherm. He reports feeling much improved since admit. He was SBA/CGA to stand and ambulate a short distance in his room on the vapotherm. He demos good strength and balance. PT will cont to work on endurance/activity tolerance in preparation of d/c home with spouse.     Time Calculation:   PT Charges     Row Name 02/12/21 1356             Time Calculation    Start Time  1100  -MARY      Stop Time  1200  -MARY      Time Calculation (min)  60 min  -MARY      PT Received On  02/12/21  -MARY      PT Goal Re-Cert Due Date  02/22/21  -MARY        User Key  (r) = Recorded By, (t) = Taken By, (c) = Cosigned By    Initials Name Provider Type    Henry Russell, PT DPT Physical Therapist        Therapy Charges for Today     Code Description Service Date Service Provider Modifiers Qty    06798728188 HC PT EVAL MOD COMPLEXITY 4 2/12/2021 Henry Alvarez PT DPT GP 1          PT G-Codes  Outcome Measure Options: AM-PAC 6 Clicks Basic Mobility (PT)  AM-PAC 6 Clicks Score (PT): 18    Henry Alvarez, CHIQUIS DPT  2/12/2021

## 2021-02-12 NOTE — PLAN OF CARE
Goal Outcome Evaluation:  Plan of Care Reviewed With: patient     Outcome Summary: PT arlinal completed. He presents alert and oriented x 4, on vapotherm. He reports feeling much improved since admit. He was SBA/CGA to stand and ambulate a short distance in his room on the vapotherm. He demos good strength and balance. PT will cont to work on endurance/activity tolerance in preparation of d/c home with spouse.

## 2021-02-12 NOTE — PROGRESS NOTES
Continued Stay Note   [place]     Patient Name: Santiago Spence  MRN: [id number]  Today's Date: 2/12/2021    Admit Date: 2/2/2021    Discharge Plan     Row Name 02/12/21 0929       Plan    Plan Comments  Patient remains in ICU on vapotherm.  Physician's note indicates patient's respiratory status is improving.  Patient may possibly benefit from LTAC placement?  Recommend therapy evaluations when medically appropriate.  SW will continue to follow to determine plan/needs.        Discharge Codes    No documentation.             Zuleyka Montemayor

## 2021-02-12 NOTE — PROGRESS NOTES
[facility] Medicine Services  INPATIENT PROGRESS NOTE    Patient Name: Santiago Spence  Date of Admission: 2/2/2021  Today's Date: 02/12/21  Length of Stay: 10  Primary Care Physician: Meredith Lloyd APRN    Subjective   Chief Complaint: SOA  HPI   Doing ok.  Afebrile.  Continues to feel better.  Eating and drinking well.      Review of Systems   Constitutional: Positive for fatigue. Negative for fever.   HENT: Negative for congestion and ear pain.    Eyes: Negative for redness and visual disturbance.   Respiratory: Positive for cough and shortness of breath. Negative for wheezing.    Cardiovascular: Negative for chest pain and palpitations.   Gastrointestinal: Negative for abdominal pain, diarrhea, nausea and vomiting.   Endocrine: Negative for cold intolerance and heat intolerance.   Genitourinary: Negative for dysuria and frequency.   Musculoskeletal: Negative for arthralgias and back pain.   Skin: Negative for rash and wound.   Neurological: Negative for dizziness and headaches.   Psychiatric/Behavioral: Negative for confusion. The patient is not nervous/anxious.         All pertinent negatives and positives are as above. All other systems have been reviewed and are negative unless otherwise stated.     Objective    Temp:  [96.5 °F (35.8 °C)-98.1 °F (36.7 °C)] 97.5 °F (36.4 °C)  Heart Rate:  [] 84  Resp:  [18-26] 21  BP: (111-159)/(56-89) 127/71  Physical Exam  Vitals signs reviewed.   Constitutional:       Appearance: He is well-developed.   HENT:      Head: Normocephalic and atraumatic.      Right Ear: External ear normal.      Left Ear: External ear normal.      Nose: Nose normal.   Eyes:      General: No scleral icterus.        Right eye: No discharge.         Left eye: No discharge.      Conjunctiva/sclera: Conjunctivae normal.      Pupils: Pupils are equal, round, and reactive to light.   Neck:      Musculoskeletal: Normal range of motion and neck supple.       Thyroid: No thyromegaly.      Trachea: No tracheal deviation.   Cardiovascular:      Rate and Rhythm: Normal rate and regular rhythm.      Heart sounds: Normal heart sounds. No murmur. No friction rub. No gallop.    Pulmonary:      Effort: Pulmonary effort is normal. No respiratory distress.      Breath sounds: No stridor. Decreased breath sounds and rhonchi present. No wheezing or rales.   Chest:      Chest wall: No tenderness.   Abdominal:      General: Bowel sounds are normal. There is no distension.      Palpations: Abdomen is soft. There is no mass.      Tenderness: There is no abdominal tenderness. There is no guarding or rebound.      Hernia: No hernia is present.   Musculoskeletal: Normal range of motion.         General: No deformity.   Lymphadenopathy:      Cervical: No cervical adenopathy.   Skin:     General: Skin is warm and dry.      Coloration: Skin is not pale.      Findings: No erythema or rash.   Neurological:      Mental Status: He is alert and oriented to person, place, and time.      Cranial Nerves: No cranial nerve deficit.      Motor: No abnormal muscle tone.      Coordination: Coordination normal.      Deep Tendon Reflexes: Reflexes are normal and symmetric. Reflexes normal.   Psychiatric:         Behavior: Behavior normal.         Thought Content: Thought content normal.         Judgment: Judgment normal.           Results Review:  I have reviewed the labs, radiology results, and diagnostic studies.    Laboratory Data:   Results from last 7 days   Lab Units 02/12/21  0616 02/11/21  0547 02/10/21  0241   WBC 10*3/mm3 10.20 12.18* 10.29   HEMOGLOBIN g/dL 15.1 15.5 15.4   HEMATOCRIT % 42.7 43.0 46.2   PLATELETS 10*3/mm3 326 346 341        Results from last 7 days   Lab Units 02/12/21  0616 02/11/21  0547 02/10/21  0241   SODIUM mmol/L 137 136 137   POTASSIUM mmol/L 4.4 4.4 4.7   CHLORIDE mmol/L 101 100 100   CO2 mmol/L 30.0* 28.0 31.0*   BUN mg/dL 22 23 22   CREATININE mg/dL 0.61* 0.58* 0.71*    CALCIUM mg/dL 8.3* 8.5* 8.4*   BILIRUBIN mg/dL 0.7 0.6 0.5   ALK PHOS U/L 58 58 62   ALT (SGPT) U/L 24 24 24   AST (SGOT) U/L 17 18 20   GLUCOSE mg/dL 85 97 127*       Culture Data:   Blood Culture   Date Value Ref Range Status   02/02/2021 No growth at 5 days  Final   02/02/2021 No growth at 5 days  Final     Respiratory Culture   Date Value Ref Range Status   02/05/2021   Final    Light growth (2+) Normal Respiratory Jazz: NO S.aureus/MRSA or Pseudomonas aeruginosa   02/02/2021   Final    Heavy growth (4+) Normal Respiratory Jazz: NO S.aureus/MRSA or Pseudomonas aeruginosa       Radiology Data:   Imaging Results (Last 24 Hours)     ** No results found for the last 24 hours. **          I have reviewed the patient's current medications.     Assessment/Plan     Active Hospital Problems    Diagnosis   • Obesity (BMI 30-39.9)   • Pneumonia due to COVID-19 virus   • Acute respiratory failure with hypoxia (CMS/HCC)   • Essential hypertension     Labs reviewed:  Leukocytosis resolved    Telemetry reviewed    Telemetry independently interpreted by me: NSR    Mr. Spence is a 70 year old gentleman with a history of HTN admitted with Acute respiratory failure with hypoxia secondary to covid-19 pneumonia.    1.  Acute respiratory failure with hypoxia  -Decadron day #10  -Completed Remdesivir  -Pulmonology following   -ID following  -Merem    2.  COVID-19 pneumonia  -Decadron day #10  -Completed Remdesivir  -Pulmonology following   -ID following  -Merem completed  -Zinc  -Statin    3.  HTN  -HCTZ  -Norvasc    4.  Obesity-BMI 37.9  -Dietician consult.    DVT PPX:  Lovenox      Discharge Planning: I expect the patient to be discharged to home in ? days  Electronically signed by Jose Russell MD, 02/12/21, 09:51 CST.

## 2021-02-12 NOTE — PLAN OF CARE
Goal Outcome Evaluation:  Plan of Care Reviewed With: patient  Progress: improving  Outcome Summary: Patients vapotherm has been weaned to 30/55. Patient slept with bipap on at 60%.

## 2021-02-12 NOTE — PROGRESS NOTES
PULMONARY AND CRITICAL CARE PROGRESS NOTE - [facility]    Patient: Santiago Spence  1950   MR# [id number]   Acct# [id number]  02/12/21   09:29 CST  Referring Provider: Jose Russell MD    Chief Complaint: Covid-19 pneumonia, acute respiratory failure    Interval history:   Patient was seen in the intensive care unit in Covid isolation from outside the glass door to reduce the risk of cross infection exposure and to minimize use of PPE.  The patient is sitting up in the chair at bedside talking on the phone.  Current saturation 95% on Vapotherm 30L and FiO2 55%.  RR 19.  No reported issues overnight.      Meds:  albuterol sulfate HFA, 2 puff, Inhalation, 4x Daily - RT  amLODIPine, 5 mg, Oral, Q24H  ascorbic acid, 500 mg, Oral, BID  aspirin, 81 mg, Oral, Daily  atorvastatin, 10 mg, Oral, Nightly  cholecalciferol, 1,000 Units, Oral, Daily  colchicine, 0.6 mg, Oral, Daily  dexamethasone, 6 mg, Oral, Daily With Breakfast  docusate sodium, 100 mg, Oral, Daily  enoxaparin, 40 mg, Subcutaneous, Q24H  famotidine, 20 mg, Oral, BID AC  hydroCHLOROthiazide, 25 mg, Oral, Daily  melatonin, 3 mg, Oral, Nightly  multivitamin and minerals, 15 mL, Oral, Daily  polyethylene glycol, 17 g, Oral, Daily  sodium chloride, 10 mL, Intravenous, Q12H  verapamil SR, 180 mg, Oral, Q24H  zinc sulfate, 220 mg, Oral, Daily         Review of Systems:   Review of Systems   Constitutional: Negative for chills and fever.   Respiratory: Positive for shortness of breath.    Cardiovascular: Negative for chest pain.   Gastrointestinal: Negative for diarrhea, nausea and vomiting.         Physical Exam:  SpO2 Percentage    02/12/21 0630 02/12/21 0700 02/12/21 0800   SpO2: 90% 93% 91%     Temp:  [96.5 °F (35.8 °C)-98.1 °F (36.7 °C)] 97.5 °F (36.4 °C)  Heart Rate:  [] 84  Resp:  [18-26] 21  BP: (111-159)/(56-89) 127/71    Intake/Output Summary (Last 24 hours) at 2/12/2021 0929  Last data filed at 2/12/2021 0807  Gross per 24  hour   Intake 480 ml   Output 1075 ml   Net -595 ml     Based on nursing assessment.    GENERAL/CONSTITUTIONAL: No distress.   HEENT: atraumatic, normocephalic  NOSE: normal, vapotherm  NECK: jugular veins nondistended  CHEST: no paradox, no retractions.  No respiratory distress.   CARDIAC: NSR  ABDOMEN: nondistended  : Deferred  EXTREMITIES: Mild lower extremity edema.  NEURO: Awake and alert, waving  SKIN: no jaundice.  No rash    Laboratory Data:  Results from last 7 days   Lab Units 02/12/21  0616 02/11/21  0547 02/10/21  0241   WBC 10*3/mm3 10.20 12.18* 10.29   HEMOGLOBIN g/dL 15.1 15.5 15.4   PLATELETS 10*3/mm3 326 346 341     Results from last 7 days   Lab Units 02/12/21  0616 02/11/21  0547 02/10/21  0241 02/09/21  0542 02/08/21  0554   SODIUM mmol/L 137 136 137 136 133*   POTASSIUM mmol/L 4.4 4.4 4.7 4.7 4.4   BUN mg/dL 22 23 22 26* 26*   CREATININE mg/dL 0.61* 0.58* 0.71* 0.67* 0.64*   CRP mg/dL  --   --  0.24  --  0.57*   FERRITIN ng/mL  --   --  543.50*  --  490.90*   D DIMER QUANT mg/L (FEU)  --  1.16*  --  0.85*  --      Results from last 7 days   Lab Units 02/06/21  0325   PH, ARTERIAL pH units 7.453*   PCO2, ARTERIAL mm Hg 47.8*   PO2 ART mm Hg 98.8   FIO2 % 90     Blood Culture   Date Value Ref Range Status   02/02/2021 No growth at 2 days  Preliminary   02/02/2021 No growth at 2 days  Preliminary     Respiratory Culture   Date Value Ref Range Status   02/02/2021   Preliminary    Heavy growth (4+) Normal Respiratory Jazz: NO S.aureus/MRSA or Pseudomonas aeruginosa     Recent films:  No radiology results for the last day  Films reviewed personally by me.  My interpretation: None today    Pulmonary Assessment:    1. SARS Covid-19 viral pneumonia -remdesivir completed  2. Acute respiratory failure with hypoxia related to COVID-19  3. Bilateral lung infiltrates  4. History of colon cancer  5. Obese  6. Obstructive sleep apnea, does not use his CPAP    Recommend:     · Continue supplemental oxygen for  O2 sat 90% to 94%  · Continue Vapotherm/BiPAP as needed  · Proning candidate: Yes, if able to  · Decadron to 6mg IV and/or PO daily. Day 9 of 10  · DVT prophylaxis-Lovenox 40 mg daily  · Stress ulcer prophylaxis-Pepcid twice daily  · Zinc, vitamin D, vitamin C, and melatonin  · Antibiotic-Merrem completed  · IS/OPEP, albuterol HFA  · Nutrition  · Mobilization     Electronically signed by: VALERIA Lowry 2/12/2021 09:29 CST      Physician substantive portion:  Patient is awake and sitting up and in no distress.  No accessory muscle use no respiratory paradox no jaundice.  Remains on Vapotherm alternating with BiPAP.  Recommend continue current Covid therapies.  Continue Decadron, monitor for fevers.  Proning if helpful for reducing his FiO2 needs.    I have seen and examined patient personally, performing a face-to-face diagnostic evaluation with plan of care reviewed and developed with APRN and nursing staff. I have addended and/or modified the above history of present illness, physical examination, and assessment and plan to reflect my findings and impressions. Essential elements of the care plan were discussed with APRN above.  Agree with findings and assessment/plan as documented above.    Electronically signed by Adam Eugene MD, on 2/12/2021, 13:09 CST

## 2021-02-13 LAB
ALBUMIN SERPL-MCNC: 2.8 G/DL (ref 3.5–5.2)
ALBUMIN/GLOB SERPL: 0.9 G/DL
ALP SERPL-CCNC: 60 U/L (ref 39–117)
ALT SERPL W P-5'-P-CCNC: 26 U/L (ref 1–41)
ANION GAP SERPL CALCULATED.3IONS-SCNC: 7 MMOL/L (ref 5–15)
AST SERPL-CCNC: 17 U/L (ref 1–40)
BASOPHILS # BLD AUTO: 0.02 10*3/MM3 (ref 0–0.2)
BASOPHILS NFR BLD AUTO: 0.2 % (ref 0–1.5)
BILIRUB SERPL-MCNC: 0.6 MG/DL (ref 0–1.2)
BUN SERPL-MCNC: 19 MG/DL (ref 8–23)
BUN/CREAT SERPL: 34.5 (ref 7–25)
CALCIUM SPEC-SCNC: 8.6 MG/DL (ref 8.6–10.5)
CHLORIDE SERPL-SCNC: 99 MMOL/L (ref 98–107)
CO2 SERPL-SCNC: 27 MMOL/L (ref 22–29)
CREAT SERPL-MCNC: 0.55 MG/DL (ref 0.76–1.27)
D DIMER PPP FEU-MCNC: 1.15 MG/L (FEU) (ref 0–0.5)
DEPRECATED RDW RBC AUTO: 42.5 FL (ref 37–54)
EOSINOPHIL # BLD AUTO: 0.01 10*3/MM3 (ref 0–0.4)
EOSINOPHIL NFR BLD AUTO: 0.1 % (ref 0.3–6.2)
ERYTHROCYTE [DISTWIDTH] IN BLOOD BY AUTOMATED COUNT: 13.1 % (ref 12.3–15.4)
FIBRINOGEN PPP-MCNC: 420 MG/DL (ref 240–460)
GFR SERPL CREATININE-BSD FRML MDRD: 146 ML/MIN/1.73
GLOBULIN UR ELPH-MCNC: 3 GM/DL
GLUCOSE SERPL-MCNC: 102 MG/DL (ref 65–99)
HCT VFR BLD AUTO: 44.7 % (ref 37.5–51)
HGB BLD-MCNC: 15.2 G/DL (ref 13–17.7)
IMM GRANULOCYTES # BLD AUTO: 0.1 10*3/MM3 (ref 0–0.05)
IMM GRANULOCYTES NFR BLD AUTO: 0.9 % (ref 0–0.5)
LDH SERPL-CCNC: 276 U/L (ref 135–225)
LYMPHOCYTES # BLD AUTO: 0.65 10*3/MM3 (ref 0.7–3.1)
LYMPHOCYTES NFR BLD AUTO: 6.1 % (ref 19.6–45.3)
MCH RBC QN AUTO: 30.4 PG (ref 26.6–33)
MCHC RBC AUTO-ENTMCNC: 34 G/DL (ref 31.5–35.7)
MCV RBC AUTO: 89.4 FL (ref 79–97)
MONOCYTES # BLD AUTO: 0.76 10*3/MM3 (ref 0.1–0.9)
MONOCYTES NFR BLD AUTO: 7.1 % (ref 5–12)
NEUTROPHILS NFR BLD AUTO: 85.6 % (ref 42.7–76)
NEUTROPHILS NFR BLD AUTO: 9.16 10*3/MM3 (ref 1.7–7)
NRBC BLD AUTO-RTO: 0 /100 WBC (ref 0–0.2)
PLATELET # BLD AUTO: 346 10*3/MM3 (ref 140–450)
PMV BLD AUTO: 9.8 FL (ref 6–12)
POTASSIUM SERPL-SCNC: 4.1 MMOL/L (ref 3.5–5.2)
PROT SERPL-MCNC: 5.8 G/DL (ref 6–8.5)
RBC # BLD AUTO: 5 10*6/MM3 (ref 4.14–5.8)
SODIUM SERPL-SCNC: 133 MMOL/L (ref 136–145)
WBC # BLD AUTO: 10.7 10*3/MM3 (ref 3.4–10.8)

## 2021-02-13 PROCEDURE — 80053 COMPREHEN METABOLIC PANEL: CPT | Performed by: INTERNAL MEDICINE

## 2021-02-13 PROCEDURE — 85379 FIBRIN DEGRADATION QUANT: CPT | Performed by: INTERNAL MEDICINE

## 2021-02-13 PROCEDURE — 94799 UNLISTED PULMONARY SVC/PX: CPT

## 2021-02-13 PROCEDURE — 94660 CPAP INITIATION&MGMT: CPT

## 2021-02-13 PROCEDURE — 25010000002 ENOXAPARIN PER 10 MG: Performed by: INTERNAL MEDICINE

## 2021-02-13 PROCEDURE — 83615 LACTATE (LD) (LDH) ENZYME: CPT | Performed by: INTERNAL MEDICINE

## 2021-02-13 PROCEDURE — 85025 COMPLETE CBC W/AUTO DIFF WBC: CPT | Performed by: INTERNAL MEDICINE

## 2021-02-13 PROCEDURE — 63710000001 DEXAMETHASONE PER 0.25 MG: Performed by: INTERNAL MEDICINE

## 2021-02-13 PROCEDURE — 85384 FIBRINOGEN ACTIVITY: CPT | Performed by: INTERNAL MEDICINE

## 2021-02-13 RX ADMIN — SODIUM CHLORIDE, PRESERVATIVE FREE 10 ML: 5 INJECTION INTRAVENOUS at 08:19

## 2021-02-13 RX ADMIN — ATORVASTATIN CALCIUM 10 MG: 10 TABLET, FILM COATED ORAL at 21:10

## 2021-02-13 RX ADMIN — ALBUTEROL SULFATE 2 PUFF: 90 AEROSOL, METERED RESPIRATORY (INHALATION) at 21:10

## 2021-02-13 RX ADMIN — HYDROCHLOROTHIAZIDE 25 MG: 25 TABLET ORAL at 08:18

## 2021-02-13 RX ADMIN — Medication 15 ML: at 08:17

## 2021-02-13 RX ADMIN — OXYCODONE HYDROCHLORIDE AND ACETAMINOPHEN 500 MG: 500 TABLET ORAL at 21:10

## 2021-02-13 RX ADMIN — AMLODIPINE BESYLATE 5 MG: 5 TABLET ORAL at 08:18

## 2021-02-13 RX ADMIN — ZINC SULFATE 220 MG (50 MG) CAPSULE 220 MG: CAPSULE at 08:18

## 2021-02-13 RX ADMIN — FAMOTIDINE 20 MG: 20 TABLET, FILM COATED ORAL at 08:18

## 2021-02-13 RX ADMIN — COLCHICINE 0.6 MG: 0.6 TABLET, FILM COATED ORAL at 08:18

## 2021-02-13 RX ADMIN — ENOXAPARIN SODIUM 40 MG: 40 INJECTION SUBCUTANEOUS at 08:17

## 2021-02-13 RX ADMIN — ALBUTEROL SULFATE 2 PUFF: 90 AEROSOL, METERED RESPIRATORY (INHALATION) at 14:42

## 2021-02-13 RX ADMIN — ALBUTEROL SULFATE 2 PUFF: 90 AEROSOL, METERED RESPIRATORY (INHALATION) at 10:15

## 2021-02-13 RX ADMIN — DOCUSATE SODIUM 100 MG: 100 CAPSULE ORAL at 08:18

## 2021-02-13 RX ADMIN — FAMOTIDINE 20 MG: 20 TABLET, FILM COATED ORAL at 16:37

## 2021-02-13 RX ADMIN — Medication 1000 UNITS: at 08:18

## 2021-02-13 RX ADMIN — VERAPAMIL HYDROCHLORIDE 180 MG: 180 TABLET, FILM COATED, EXTENDED RELEASE ORAL at 08:17

## 2021-02-13 RX ADMIN — ALBUTEROL SULFATE 2 PUFF: 90 AEROSOL, METERED RESPIRATORY (INHALATION) at 05:28

## 2021-02-13 RX ADMIN — ASPIRIN 81 MG: 81 TABLET, CHEWABLE ORAL at 08:18

## 2021-02-13 RX ADMIN — OXYCODONE HYDROCHLORIDE AND ACETAMINOPHEN 500 MG: 500 TABLET ORAL at 08:18

## 2021-02-13 RX ADMIN — SODIUM CHLORIDE, PRESERVATIVE FREE 10 ML: 5 INJECTION INTRAVENOUS at 21:11

## 2021-02-13 RX ADMIN — Medication 3 MG: at 21:10

## 2021-02-13 RX ADMIN — DEXAMETHASONE 6 MG: 4 TABLET ORAL at 08:18

## 2021-02-13 NOTE — PLAN OF CARE
"Goal Outcome Evaluation:     Progress: improving  Outcome Summary: Pt A&Ox4. VSS. Up x1 standby assist to BSC. Bipap @ night. Vapotherm waned to 30 and 50. BM this shift. States he \"feels much better today and is ready to go home.\" Up in chair. Daily weight completed via night shift. Voiding per urinal. Safety maintained, will continue to monitor.  "

## 2021-02-13 NOTE — PLAN OF CARE
Goal Outcome Evaluation:      Patient transferred to this unit from ICU. He is on vapotherm at 30/55 per NC. VSS, very pleasant and motivated to get well and go home. He is SBA, A*O x's 4.

## 2021-02-13 NOTE — PROGRESS NOTES
[facility] Medicine Services  INPATIENT PROGRESS NOTE    Patient Name: Santiago Spence  Date of Admission: 2/2/2021  Today's Date: 02/13/21  Length of Stay: 11  Primary Care Physician: Meredith Lloyd APRN    Subjective   Chief Complaint: SOA  HPI   Doing ok.  Afebrile.  Continues to feel better.  Less SOA.    Review of Systems   Constitutional: Positive for fatigue. Negative for fever.   HENT: Negative for congestion and ear pain.    Eyes: Negative for redness and visual disturbance.   Respiratory: Positive for cough and shortness of breath. Negative for wheezing.    Cardiovascular: Negative for chest pain and palpitations.   Gastrointestinal: Negative for abdominal pain, diarrhea, nausea and vomiting.   Endocrine: Negative for cold intolerance and heat intolerance.   Genitourinary: Negative for dysuria and frequency.   Musculoskeletal: Negative for arthralgias and back pain.   Skin: Negative for rash and wound.   Neurological: Negative for dizziness and headaches.   Psychiatric/Behavioral: Negative for confusion. The patient is not nervous/anxious.         All pertinent negatives and positives are as above. All other systems have been reviewed and are negative unless otherwise stated.     Objective    Temp:  [96.4 °F (35.8 °C)-98.1 °F (36.7 °C)] 96.9 °F (36.1 °C)  Heart Rate:  [58-81] 81  Resp:  [14-26] 22  BP: (103-146)/(65-80) 137/80  Physical Exam  Vitals signs reviewed.   Constitutional:       Appearance: He is well-developed.   HENT:      Head: Normocephalic and atraumatic.      Right Ear: External ear normal.      Left Ear: External ear normal.      Nose: Nose normal.   Eyes:      General: No scleral icterus.        Right eye: No discharge.         Left eye: No discharge.      Conjunctiva/sclera: Conjunctivae normal.      Pupils: Pupils are equal, round, and reactive to light.   Neck:      Musculoskeletal: Normal range of motion and neck supple.      Thyroid: No  thyromegaly.      Trachea: No tracheal deviation.   Cardiovascular:      Rate and Rhythm: Normal rate and regular rhythm.      Heart sounds: Normal heart sounds. No murmur. No friction rub. No gallop.    Pulmonary:      Effort: Pulmonary effort is normal. No respiratory distress.      Breath sounds: No stridor. Decreased breath sounds and rhonchi present. No wheezing or rales.   Chest:      Chest wall: No tenderness.   Abdominal:      General: Bowel sounds are normal. There is no distension.      Palpations: Abdomen is soft. There is no mass.      Tenderness: There is no abdominal tenderness. There is no guarding or rebound.      Hernia: No hernia is present.   Musculoskeletal: Normal range of motion.         General: No deformity.   Lymphadenopathy:      Cervical: No cervical adenopathy.   Skin:     General: Skin is warm and dry.      Coloration: Skin is not pale.      Findings: No erythema or rash.   Neurological:      Mental Status: He is alert and oriented to person, place, and time.      Cranial Nerves: No cranial nerve deficit.      Motor: No abnormal muscle tone.      Coordination: Coordination normal.      Deep Tendon Reflexes: Reflexes are normal and symmetric. Reflexes normal.   Psychiatric:         Behavior: Behavior normal.         Thought Content: Thought content normal.         Judgment: Judgment normal.           Results Review:  I have reviewed the labs, radiology results, and diagnostic studies.    Laboratory Data:   Results from last 7 days   Lab Units 02/13/21  0641 02/12/21  0616 02/11/21  0547   WBC 10*3/mm3 10.70 10.20 12.18*   HEMOGLOBIN g/dL 15.2 15.1 15.5   HEMATOCRIT % 44.7 42.7 43.0   PLATELETS 10*3/mm3 346 326 346        Results from last 7 days   Lab Units 02/13/21  0641 02/12/21  0616 02/11/21  0547   SODIUM mmol/L 133* 137 136   POTASSIUM mmol/L 4.1 4.4 4.4   CHLORIDE mmol/L 99 101 100   CO2 mmol/L 27.0 30.0* 28.0   BUN mg/dL 19 22 23   CREATININE mg/dL 0.55* 0.61* 0.58*   CALCIUM  mg/dL 8.6 8.3* 8.5*   BILIRUBIN mg/dL 0.6 0.7 0.6   ALK PHOS U/L 60 58 58   ALT (SGPT) U/L 26 24 24   AST (SGOT) U/L 17 17 18   GLUCOSE mg/dL 102* 85 97       Culture Data:   Blood Culture   Date Value Ref Range Status   02/02/2021 No growth at 5 days  Final   02/02/2021 No growth at 5 days  Final     Respiratory Culture   Date Value Ref Range Status   02/05/2021   Final    Light growth (2+) Normal Respiratory Jazz: NO S.aureus/MRSA or Pseudomonas aeruginosa   02/02/2021   Final    Heavy growth (4+) Normal Respiratory Jazz: NO S.aureus/MRSA or Pseudomonas aeruginosa       Radiology Data:   Imaging Results (Last 24 Hours)     ** No results found for the last 24 hours. **          I have reviewed the patient's current medications.     Assessment/Plan     Active Hospital Problems    Diagnosis   • Obesity (BMI 30-39.9)   • Pneumonia due to COVID-19 virus   • Acute respiratory failure with hypoxia (CMS/HCC)   • Essential hypertension     Labs reviewed:  Leukocytosis resolved     Telemetry reviewed    Telemetry independently interpreted by me: NSR    Mr. Spence is a 70 year old gentleman with a history of HTN admitted with Acute respiratory failure with hypoxia secondary to covid-19 pneumonia.    1.  Acute respiratory failure with hypoxia  -Decadron day #10  -Completed Remdesivir  -Pulmonology following   -ID following  -Merem    2.  COVID-19 pneumonia  -Decadron day #10  -Completed Remdesivir  -Pulmonology following   -ID following  -Merem completed  -Zinc  -Statin    3.  HTN  -HCTZ  -Norvasc    4.  Obesity-BMI 37.9  -Dietician consult.    DVT PPX:  Lovenox      Discharge Planning: I expect the patient to be discharged to home in ? days  Electronically signed by Jose Russell MD, 02/13/21, 16:03 CST.

## 2021-02-13 NOTE — PLAN OF CARE
Goal Outcome Evaluation:  Plan of Care Reviewed With: patient     Outcome Summary: VSS.O2 sats stable. Pt with Bipap on while asleep. Up with stand by assist, safety maintained. Enhanced isolation precautions continued. Will continue to monitor.

## 2021-02-13 NOTE — PROGRESS NOTES
PULMONARY AND CRITICAL CARE PROGRESS NOTE - [facility]    Patient: Santiago Spence  1950   MR# [id number]   Acct# [id number]  02/13/21   13:36 CST  Referring Provider: Jose Russell MD    Chief Complaint: Covid-19 pneumonia, acute respiratory failure    Interval history:   Discussed with nursing staff.  Currently on Vapotherm/OptiFlow 30 L 55% FiO2.  Feels better per nursing staff.    Meds:  albuterol sulfate HFA, 2 puff, Inhalation, 4x Daily - RT  amLODIPine, 5 mg, Oral, Q24H  ascorbic acid, 500 mg, Oral, BID  aspirin, 81 mg, Oral, Daily  atorvastatin, 10 mg, Oral, Nightly  cholecalciferol, 1,000 Units, Oral, Daily  colchicine, 0.6 mg, Oral, Daily  dexamethasone, 6 mg, Oral, Daily With Breakfast  docusate sodium, 100 mg, Oral, Daily  enoxaparin, 40 mg, Subcutaneous, Q24H  famotidine, 20 mg, Oral, BID AC  hydroCHLOROthiazide, 25 mg, Oral, Daily  melatonin, 3 mg, Oral, Nightly  multivitamin and minerals, 15 mL, Oral, Daily  polyethylene glycol, 17 g, Oral, Daily  sodium chloride, 10 mL, Intravenous, Q12H  verapamil SR, 180 mg, Oral, Q24H  zinc sulfate, 220 mg, Oral, Daily         Review of Systems:   Review of Systems   Constitutional: Negative for chills and fever.   Respiratory: Positive for shortness of breath.    Cardiovascular: Negative for chest pain.   Gastrointestinal: Negative for diarrhea, nausea and vomiting.         Physical Exam:  SpO2 Percentage    02/13/21 0516 02/13/21 0758 02/13/21 1150   SpO2: 95% 90% 94%     Temp:  [96.4 °F (35.8 °C)-98.2 °F (36.8 °C)] 96.9 °F (36.1 °C)  Heart Rate:  [58-82] 81  Resp:  [14-26] 22  BP: (103-146)/(65-80) 137/80    Intake/Output Summary (Last 24 hours) at 2/13/2021 1336  Last data filed at 2/13/2021 0758  Gross per 24 hour   Intake 240 ml   Output 1700 ml   Net -1460 ml     Based on nursing assessment.    No signs reviewed    Laboratory Data:  Results from last 7 days   Lab Units 02/13/21  0641 02/12/21  0616 02/11/21  0547   WBC 10*3/mm3  10.70 10.20 12.18*   HEMOGLOBIN g/dL 15.2 15.1 15.5   PLATELETS 10*3/mm3 346 326 346     Results from last 7 days   Lab Units 02/13/21  0641 02/12/21  0616 02/11/21  0547 02/10/21  0241  02/08/21  0554   SODIUM mmol/L 133* 137 136 137   < > 133*   POTASSIUM mmol/L 4.1 4.4 4.4 4.7   < > 4.4   BUN mg/dL 19 22 23 22   < > 26*   CREATININE mg/dL 0.55* 0.61* 0.58* 0.71*   < > 0.64*   CRP mg/dL  --   --   --  0.24  --  0.57*   FERRITIN ng/mL  --   --   --  543.50*  --  490.90*   D DIMER QUANT mg/L (FEU) 1.15*  --  1.16*  --    < >  --     < > = values in this interval not displayed.         Blood Culture   Date Value Ref Range Status   02/02/2021 No growth at 2 days  Preliminary   02/02/2021 No growth at 2 days  Preliminary     Respiratory Culture   Date Value Ref Range Status   02/02/2021   Preliminary    Heavy growth (4+) Normal Respiratory Jazz: NO S.aureus/MRSA or Pseudomonas aeruginosa     Recent films:  No radiology results for the last day  Films reviewed personally by me.  My interpretation: None today    Pulmonary Assessment:    1. SARS Covid-19 viral pneumonia -remdesivir completed  2. Acute respiratory failure with hypoxia related to COVID-19  3. Bilateral lung infiltrates  4. History of colon cancer  5. Obese  6. Obstructive sleep apnea, does not use his CPAP    Recommend:     · Continue supplemental oxygen for O2 sat 90% to 94%.  Wean to keep sats above 88%  · Continue Vapotherm/BiPAP as needed  · Proning candidate: Yes, if able to  · Completed 10 days of Decadron.  I will repeat chest x-ray in the morning and decide if needs to be discontinued  · DVT prophylaxis-Lovenox 40 mg daily  · Stress ulcer prophylaxis-Pepcid twice daily  · Zinc, vitamin D, vitamin C, and melatonin  · Antibiotic-Merrem completed  · IS/OPEP, albuterol HFA  · Nutrition  · Mobilization     Electronically signed by: Shabbir Kwan MD 2/13/2021 13:36 CST

## 2021-02-14 ENCOUNTER — APPOINTMENT (OUTPATIENT)
Dept: GENERAL RADIOLOGY | Facility: HOSPITAL | Age: 71
End: 2021-02-14

## 2021-02-14 LAB
ALBUMIN SERPL-MCNC: 2.9 G/DL (ref 3.5–5.2)
ALBUMIN/GLOB SERPL: 1.4 G/DL
ALP SERPL-CCNC: 55 U/L (ref 39–117)
ALT SERPL W P-5'-P-CCNC: 25 U/L (ref 1–41)
ANION GAP SERPL CALCULATED.3IONS-SCNC: 6 MMOL/L (ref 5–15)
AST SERPL-CCNC: 15 U/L (ref 1–40)
BASOPHILS # BLD AUTO: 0.02 10*3/MM3 (ref 0–0.2)
BASOPHILS NFR BLD AUTO: 0.2 % (ref 0–1.5)
BILIRUB SERPL-MCNC: 0.6 MG/DL (ref 0–1.2)
BUN SERPL-MCNC: 22 MG/DL (ref 8–23)
BUN/CREAT SERPL: 33.3 (ref 7–25)
CALCIUM SPEC-SCNC: 8.3 MG/DL (ref 8.6–10.5)
CHLORIDE SERPL-SCNC: 101 MMOL/L (ref 98–107)
CO2 SERPL-SCNC: 31 MMOL/L (ref 22–29)
CREAT SERPL-MCNC: 0.66 MG/DL (ref 0.76–1.27)
DEPRECATED RDW RBC AUTO: 44 FL (ref 37–54)
EOSINOPHIL # BLD AUTO: 0.01 10*3/MM3 (ref 0–0.4)
EOSINOPHIL NFR BLD AUTO: 0.1 % (ref 0.3–6.2)
ERYTHROCYTE [DISTWIDTH] IN BLOOD BY AUTOMATED COUNT: 13.3 % (ref 12.3–15.4)
GFR SERPL CREATININE-BSD FRML MDRD: 119 ML/MIN/1.73
GLOBULIN UR ELPH-MCNC: 2.1 GM/DL
GLUCOSE SERPL-MCNC: 86 MG/DL (ref 65–99)
HCT VFR BLD AUTO: 41.8 % (ref 37.5–51)
HGB BLD-MCNC: 14.6 G/DL (ref 13–17.7)
IMM GRANULOCYTES # BLD AUTO: 0.09 10*3/MM3 (ref 0–0.05)
IMM GRANULOCYTES NFR BLD AUTO: 0.9 % (ref 0–0.5)
LYMPHOCYTES # BLD AUTO: 0.68 10*3/MM3 (ref 0.7–3.1)
LYMPHOCYTES NFR BLD AUTO: 7.2 % (ref 19.6–45.3)
MCH RBC QN AUTO: 31.4 PG (ref 26.6–33)
MCHC RBC AUTO-ENTMCNC: 34.9 G/DL (ref 31.5–35.7)
MCV RBC AUTO: 89.9 FL (ref 79–97)
MONOCYTES # BLD AUTO: 0.76 10*3/MM3 (ref 0.1–0.9)
MONOCYTES NFR BLD AUTO: 8 % (ref 5–12)
NEUTROPHILS NFR BLD AUTO: 7.93 10*3/MM3 (ref 1.7–7)
NEUTROPHILS NFR BLD AUTO: 83.6 % (ref 42.7–76)
NRBC BLD AUTO-RTO: 0 /100 WBC (ref 0–0.2)
PLATELET # BLD AUTO: 310 10*3/MM3 (ref 140–450)
PMV BLD AUTO: 9.9 FL (ref 6–12)
POTASSIUM SERPL-SCNC: 4.3 MMOL/L (ref 3.5–5.2)
PROT SERPL-MCNC: 5 G/DL (ref 6–8.5)
RBC # BLD AUTO: 4.65 10*6/MM3 (ref 4.14–5.8)
SODIUM SERPL-SCNC: 138 MMOL/L (ref 136–145)
WBC # BLD AUTO: 9.49 10*3/MM3 (ref 3.4–10.8)

## 2021-02-14 PROCEDURE — 80053 COMPREHEN METABOLIC PANEL: CPT | Performed by: INTERNAL MEDICINE

## 2021-02-14 PROCEDURE — 94660 CPAP INITIATION&MGMT: CPT

## 2021-02-14 PROCEDURE — 94799 UNLISTED PULMONARY SVC/PX: CPT

## 2021-02-14 PROCEDURE — 85025 COMPLETE CBC W/AUTO DIFF WBC: CPT | Performed by: INTERNAL MEDICINE

## 2021-02-14 PROCEDURE — 25010000002 ENOXAPARIN PER 10 MG: Performed by: INTERNAL MEDICINE

## 2021-02-14 PROCEDURE — 63710000001 DEXAMETHASONE PER 0.25 MG: Performed by: INTERNAL MEDICINE

## 2021-02-14 PROCEDURE — 71045 X-RAY EXAM CHEST 1 VIEW: CPT

## 2021-02-14 RX ADMIN — ZINC SULFATE 220 MG (50 MG) CAPSULE 220 MG: CAPSULE at 08:44

## 2021-02-14 RX ADMIN — ENOXAPARIN SODIUM 40 MG: 40 INJECTION SUBCUTANEOUS at 08:44

## 2021-02-14 RX ADMIN — ATORVASTATIN CALCIUM 10 MG: 10 TABLET, FILM COATED ORAL at 21:09

## 2021-02-14 RX ADMIN — FAMOTIDINE 20 MG: 20 TABLET, FILM COATED ORAL at 16:31

## 2021-02-14 RX ADMIN — HYDROCHLOROTHIAZIDE 25 MG: 25 TABLET ORAL at 08:44

## 2021-02-14 RX ADMIN — VERAPAMIL HYDROCHLORIDE 180 MG: 180 TABLET, FILM COATED, EXTENDED RELEASE ORAL at 08:45

## 2021-02-14 RX ADMIN — ALBUTEROL SULFATE 2 PUFF: 90 AEROSOL, METERED RESPIRATORY (INHALATION) at 21:09

## 2021-02-14 RX ADMIN — OXYCODONE HYDROCHLORIDE AND ACETAMINOPHEN 500 MG: 500 TABLET ORAL at 08:44

## 2021-02-14 RX ADMIN — DOCUSATE SODIUM 100 MG: 100 CAPSULE ORAL at 08:44

## 2021-02-14 RX ADMIN — ALBUTEROL SULFATE 2 PUFF: 90 AEROSOL, METERED RESPIRATORY (INHALATION) at 10:28

## 2021-02-14 RX ADMIN — OXYCODONE HYDROCHLORIDE AND ACETAMINOPHEN 500 MG: 500 TABLET ORAL at 21:09

## 2021-02-14 RX ADMIN — Medication 15 ML: at 08:45

## 2021-02-14 RX ADMIN — FAMOTIDINE 20 MG: 20 TABLET, FILM COATED ORAL at 08:45

## 2021-02-14 RX ADMIN — AMLODIPINE BESYLATE 5 MG: 5 TABLET ORAL at 08:44

## 2021-02-14 RX ADMIN — Medication 1000 UNITS: at 08:44

## 2021-02-14 RX ADMIN — COLCHICINE 0.6 MG: 0.6 TABLET, FILM COATED ORAL at 08:44

## 2021-02-14 RX ADMIN — ALBUTEROL SULFATE 2 PUFF: 90 AEROSOL, METERED RESPIRATORY (INHALATION) at 14:55

## 2021-02-14 RX ADMIN — DEXAMETHASONE 6 MG: 4 TABLET ORAL at 08:44

## 2021-02-14 RX ADMIN — Medication 3 MG: at 21:09

## 2021-02-14 RX ADMIN — ALBUTEROL SULFATE 2 PUFF: 90 AEROSOL, METERED RESPIRATORY (INHALATION) at 06:33

## 2021-02-14 RX ADMIN — SODIUM CHLORIDE, PRESERVATIVE FREE 10 ML: 5 INJECTION INTRAVENOUS at 08:45

## 2021-02-14 RX ADMIN — ASPIRIN 81 MG: 81 TABLET, CHEWABLE ORAL at 08:44

## 2021-02-14 RX ADMIN — SODIUM CHLORIDE, PRESERVATIVE FREE 10 ML: 5 INJECTION INTRAVENOUS at 21:15

## 2021-02-14 NOTE — PROGRESS NOTES
[facility] Medicine Services  INPATIENT PROGRESS NOTE    Patient Name: Santiago Spence  Date of Admission: 2/2/2021  Today's Date: 02/14/21  Length of Stay: 12  Primary Care Physician: Meredith Lloyd APRN    Subjective   Chief Complaint: SOA  HPI   Doing well, continues to improve.  Afebrile.  Down to 9L NC    Review of Systems   Constitutional: Positive for fatigue. Negative for fever.   HENT: Negative for congestion and ear pain.    Eyes: Negative for redness and visual disturbance.   Respiratory: Positive for cough and shortness of breath. Negative for wheezing.    Cardiovascular: Negative for chest pain and palpitations.   Gastrointestinal: Negative for abdominal pain, diarrhea, nausea and vomiting.   Endocrine: Negative for cold intolerance and heat intolerance.   Genitourinary: Negative for dysuria and frequency.   Musculoskeletal: Negative for arthralgias and back pain.   Skin: Negative for rash and wound.   Neurological: Negative for dizziness and headaches.   Psychiatric/Behavioral: Negative for confusion. The patient is not nervous/anxious.         All pertinent negatives and positives are as above. All other systems have been reviewed and are negative unless otherwise stated.     Objective    Temp:  [96.8 °F (36 °C)-98.3 °F (36.8 °C)] 98.2 °F (36.8 °C)  Heart Rate:  [61-99] 87  Resp:  [14-26] 18  BP: (117-152)/(61-86) 125/64  Physical Exam  Vitals signs reviewed.   Constitutional:       Appearance: He is well-developed.   HENT:      Head: Normocephalic and atraumatic.      Right Ear: External ear normal.      Left Ear: External ear normal.      Nose: Nose normal.   Eyes:      General: No scleral icterus.        Right eye: No discharge.         Left eye: No discharge.      Conjunctiva/sclera: Conjunctivae normal.      Pupils: Pupils are equal, round, and reactive to light.   Neck:      Musculoskeletal: Normal range of motion and neck supple.      Thyroid: No  thyromegaly.      Trachea: No tracheal deviation.   Cardiovascular:      Rate and Rhythm: Normal rate and regular rhythm.      Heart sounds: Normal heart sounds. No murmur. No friction rub. No gallop.    Pulmonary:      Effort: Pulmonary effort is normal. No respiratory distress.      Breath sounds: No stridor. Decreased breath sounds and rhonchi present. No wheezing or rales.   Chest:      Chest wall: No tenderness.   Abdominal:      General: Bowel sounds are normal. There is no distension.      Palpations: Abdomen is soft. There is no mass.      Tenderness: There is no abdominal tenderness. There is no guarding or rebound.      Hernia: No hernia is present.   Musculoskeletal: Normal range of motion.         General: No deformity.   Lymphadenopathy:      Cervical: No cervical adenopathy.   Skin:     General: Skin is warm and dry.      Coloration: Skin is not pale.      Findings: No erythema or rash.   Neurological:      Mental Status: He is alert and oriented to person, place, and time.      Cranial Nerves: No cranial nerve deficit.      Motor: No abnormal muscle tone.      Coordination: Coordination normal.      Deep Tendon Reflexes: Reflexes are normal and symmetric. Reflexes normal.   Psychiatric:         Behavior: Behavior normal.         Thought Content: Thought content normal.         Judgment: Judgment normal.           Results Review:  I have reviewed the labs, radiology results, and diagnostic studies.    Laboratory Data:   Results from last 7 days   Lab Units 02/14/21  0529 02/13/21  0641 02/12/21  0616   WBC 10*3/mm3 9.49 10.70 10.20   HEMOGLOBIN g/dL 14.6 15.2 15.1   HEMATOCRIT % 41.8 44.7 42.7   PLATELETS 10*3/mm3 310 346 326        Results from last 7 days   Lab Units 02/14/21  0529 02/13/21  0641 02/12/21  0616   SODIUM mmol/L 138 133* 137   POTASSIUM mmol/L 4.3 4.1 4.4   CHLORIDE mmol/L 101 99 101   CO2 mmol/L 31.0* 27.0 30.0*   BUN mg/dL 22 19 22   CREATININE mg/dL 0.66* 0.55* 0.61*   CALCIUM mg/dL  8.3* 8.6 8.3*   BILIRUBIN mg/dL 0.6 0.6 0.7   ALK PHOS U/L 55 60 58   ALT (SGPT) U/L 25 26 24   AST (SGOT) U/L 15 17 17   GLUCOSE mg/dL 86 102* 85       Culture Data:   Blood Culture   Date Value Ref Range Status   02/02/2021 No growth at 5 days  Final   02/02/2021 No growth at 5 days  Final     Respiratory Culture   Date Value Ref Range Status   02/05/2021   Final    Light growth (2+) Normal Respiratory Jazz: NO S.aureus/MRSA or Pseudomonas aeruginosa   02/02/2021   Final    Heavy growth (4+) Normal Respiratory Jazz: NO S.aureus/MRSA or Pseudomonas aeruginosa       Radiology Data:   Imaging Results (Last 24 Hours)     Procedure Component Value Units Date/Time    XR Chest 1 View [[id number]] Collected: 02/14/21 1300     Updated: 02/14/21 1304    Narrative:      EXAMINATION: Chest 1 view 2/14/2021     HISTORY: Covid 19 pneumonia     FINDINGS: Today's exam is compared to previous study of 6 days earlier.  There is persistent bilateral mixed interstitial and alveolar pneumonia.  There is increased consolidation within the lung bases with partial  silhouetting of the diaphragms. The cardiac silhouette is mildly  enlarged. There is no free air beneath the hemidiaphragms.       Impression:      1.. Bibasilar pneumonia. This shows some increased consolidation  compared to the previous exam of 6 days earlier.  This report was finalized on 02/14/2021 13:01 by Dr. Kang Wilkes MD.          I have reviewed the patient's current medications.     Assessment/Plan     Active Hospital Problems    Diagnosis   • Obesity (BMI 30-39.9)   • Pneumonia due to COVID-19 virus   • Acute respiratory failure with hypoxia (CMS/HCC)   • Essential hypertension     Labs reviewed:  Normal renal function and LFt's    Telemetry reviewed    Telemetry independently interpreted by me: NSR    Mr. Spence is a 70 year old gentleman with a history of HTN admitted with Acute respiratory failure with hypoxia secondary to covid-19 pneumonia.    1.  Acute  respiratory failure with hypoxia  -Decadron day #10  -Completed Remdesivir  -Pulmonology following   -ID following  -Merem    2.  COVID-19 pneumonia  -Decadron day #10  -Completed Remdesivir  -Pulmonology following   -ID following  -Merem completed  -Zinc  -Statin    3.  HTN  -HCTZ  -Norvasc    4.  Obesity-BMI 37.9  -Dietician consult.    DVT PPX:  Lovenox      Discharge Planning: I expect the patient to be discharged to home in ? days  Electronically signed by Jose Russell MD, 02/14/21, 13:59 CST.

## 2021-02-14 NOTE — PLAN OF CARE
Goal Outcome Evaluation:  Plan of Care Reviewed With: patient  Progress: improving  Outcome Summary: O2 weaned to 10L high flow cannula. VSS. Pt reported able to sleep well through night. Up with stand by assist, safety maintained. Enhanced isolation precautions continued

## 2021-02-14 NOTE — PLAN OF CARE
Goal Outcome Evaluation:     Progress: improving  Outcome Summary: Pt A&Ox4. VSS. No c/o pain this shift. Up x1 to chair. O2@ 8L- sats in 90s, will continue to wean as tolerated. Voiding per urinal. Safety maintained, will continue to monitor.

## 2021-02-14 NOTE — PROGRESS NOTES
PULMONARY AND CRITICAL CARE PROGRESS NOTE - [facility]    Patient: Santiago Spence  1950   MR# [id number]   Acct# [id number]  02/14/21   13:58 CST  Referring Provider: Jose Russell MD    Chief Complaint: Covid-19 pneumonia, acute respiratory failure    Interval history:   Discussed with nursing staff.  She is down to 8 L and seems to be doing well.  Working with incentive spirometry.    Meds:  albuterol sulfate HFA, 2 puff, Inhalation, 4x Daily - RT  amLODIPine, 5 mg, Oral, Q24H  ascorbic acid, 500 mg, Oral, BID  aspirin, 81 mg, Oral, Daily  atorvastatin, 10 mg, Oral, Nightly  cholecalciferol, 1,000 Units, Oral, Daily  colchicine, 0.6 mg, Oral, Daily  dexamethasone, 6 mg, Oral, Daily With Breakfast  docusate sodium, 100 mg, Oral, Daily  enoxaparin, 40 mg, Subcutaneous, Q24H  famotidine, 20 mg, Oral, BID AC  hydroCHLOROthiazide, 25 mg, Oral, Daily  melatonin, 3 mg, Oral, Nightly  multivitamin and minerals, 15 mL, Oral, Daily  polyethylene glycol, 17 g, Oral, Daily  sodium chloride, 10 mL, Intravenous, Q12H  verapamil SR, 180 mg, Oral, Q24H  zinc sulfate, 220 mg, Oral, Daily         Review of Systems:       Physical Exam:  SpO2 Percentage    02/14/21 0635 02/14/21 0758 02/14/21 1139   SpO2: 95% 92% 91%     Temp:  [96.8 °F (36 °C)-98.3 °F (36.8 °C)] 98.2 °F (36.8 °C)  Heart Rate:  [61-99] 87  Resp:  [14-26] 18  BP: (117-152)/(61-86) 125/64    Intake/Output Summary (Last 24 hours) at 2/14/2021 1358  Last data filed at 2/14/2021 0849  Gross per 24 hour   Intake 500 ml   Output 2375 ml   Net -1875 ml     Based on nursing assessment.    No signs reviewed    Laboratory Data:  Results from last 7 days   Lab Units 02/14/21  0529 02/13/21  0641 02/12/21  0616   WBC 10*3/mm3 9.49 10.70 10.20   HEMOGLOBIN g/dL 14.6 15.2 15.1   PLATELETS 10*3/mm3 310 346 326     Results from last 7 days   Lab Units 02/14/21  0529 02/13/21  0641 02/12/21  0616 02/11/21  0547 02/10/21  0241  02/08/21  0554   SODIUM  mmol/L 138 133* 137 136 137   < > 133*   POTASSIUM mmol/L 4.3 4.1 4.4 4.4 4.7   < > 4.4   BUN mg/dL 22 19 22 23 22   < > 26*   CREATININE mg/dL 0.66* 0.55* 0.61* 0.58* 0.71*   < > 0.64*   CRP mg/dL  --   --   --   --  0.24  --  0.57*   FERRITIN ng/mL  --   --   --   --  543.50*  --  490.90*   D DIMER QUANT mg/L (FEU)  --  1.15*  --  1.16*  --    < >  --     < > = values in this interval not displayed.         Blood Culture   Date Value Ref Range Status   02/02/2021 No growth at 2 days  Preliminary   02/02/2021 No growth at 2 days  Preliminary     Respiratory Culture   Date Value Ref Range Status   02/02/2021   Preliminary    Heavy growth (4+) Normal Respiratory Jazz: NO S.aureus/MRSA or Pseudomonas aeruginosa     Recent films:  Xr Chest 1 View    Result Date: 2/14/2021  1.. Bibasilar pneumonia. This shows some increased consolidation compared to the previous exam of 6 days earlier. This report was finalized on 02/14/2021 13:01 by Dr. Kang Wilkes MD.    Films reviewed personally by me.  My interpretation: None today    Pulmonary Assessment:    1. SARS Covid-19 viral pneumonia -remdesivir completed  2. Acute respiratory failure with hypoxia related to COVID-19  3. Bilateral lung infiltrates  4. History of colon cancer  5. Obese  6. Obstructive sleep apnea, does not use his CPAP    Recommend:     · Patient has been transitioned to high flow cannula.  Wean down oxygen to keep sats above 88%  · Proning candidate: Yes, if able to  · Completed 10 days of Decadron.  Reviewed chest x-ray showed severe postinflammatory scarring.  Patient already received 10 days of Decadron.  I will discontinue  · DVT prophylaxis-Lovenox 40 mg daily  · Stress ulcer prophylaxis-Pepcid twice daily  · Zinc, vitamin D, vitamin C, and melatonin  · Antibiotic-Merrem completed  · IS/OPEP, albuterol HFA  · Nutrition  · Mobilization     Electronically signed by: Shabbir Kwan MD 2/14/2021 13:58 CST

## 2021-02-15 LAB
ALBUMIN SERPL-MCNC: 2.7 G/DL (ref 3.5–5.2)
ALBUMIN/GLOB SERPL: 0.9 G/DL
ALP SERPL-CCNC: 55 U/L (ref 39–117)
ALT SERPL W P-5'-P-CCNC: 23 U/L (ref 1–41)
ANION GAP SERPL CALCULATED.3IONS-SCNC: 4 MMOL/L (ref 5–15)
AST SERPL-CCNC: 16 U/L (ref 1–40)
BASOPHILS # BLD AUTO: 0.01 10*3/MM3 (ref 0–0.2)
BASOPHILS NFR BLD AUTO: 0.1 % (ref 0–1.5)
BILIRUB SERPL-MCNC: 0.6 MG/DL (ref 0–1.2)
BUN SERPL-MCNC: 20 MG/DL (ref 8–23)
BUN/CREAT SERPL: 31.3 (ref 7–25)
CALCIUM SPEC-SCNC: 8.3 MG/DL (ref 8.6–10.5)
CHLORIDE SERPL-SCNC: 100 MMOL/L (ref 98–107)
CO2 SERPL-SCNC: 32 MMOL/L (ref 22–29)
CREAT SERPL-MCNC: 0.64 MG/DL (ref 0.76–1.27)
D DIMER PPP FEU-MCNC: 0.56 MG/L (FEU) (ref 0–0.5)
DEPRECATED RDW RBC AUTO: 44.4 FL (ref 37–54)
EOSINOPHIL # BLD AUTO: 0.01 10*3/MM3 (ref 0–0.4)
EOSINOPHIL NFR BLD AUTO: 0.1 % (ref 0.3–6.2)
ERYTHROCYTE [DISTWIDTH] IN BLOOD BY AUTOMATED COUNT: 13.2 % (ref 12.3–15.4)
FIBRINOGEN PPP-MCNC: 442 MG/DL (ref 240–460)
GFR SERPL CREATININE-BSD FRML MDRD: 123 ML/MIN/1.73
GLOBULIN UR ELPH-MCNC: 2.9 GM/DL
GLUCOSE SERPL-MCNC: 93 MG/DL (ref 65–99)
HCT VFR BLD AUTO: 43.3 % (ref 37.5–51)
HGB BLD-MCNC: 14.7 G/DL (ref 13–17.7)
IMM GRANULOCYTES # BLD AUTO: 0.08 10*3/MM3 (ref 0–0.05)
IMM GRANULOCYTES NFR BLD AUTO: 0.8 % (ref 0–0.5)
LDH SERPL-CCNC: 263 U/L (ref 135–225)
LYMPHOCYTES # BLD AUTO: 0.62 10*3/MM3 (ref 0.7–3.1)
LYMPHOCYTES NFR BLD AUTO: 6.3 % (ref 19.6–45.3)
MCH RBC QN AUTO: 30.9 PG (ref 26.6–33)
MCHC RBC AUTO-ENTMCNC: 33.9 G/DL (ref 31.5–35.7)
MCV RBC AUTO: 91.2 FL (ref 79–97)
MONOCYTES # BLD AUTO: 0.76 10*3/MM3 (ref 0.1–0.9)
MONOCYTES NFR BLD AUTO: 7.7 % (ref 5–12)
NEUTROPHILS NFR BLD AUTO: 8.35 10*3/MM3 (ref 1.7–7)
NEUTROPHILS NFR BLD AUTO: 85 % (ref 42.7–76)
NRBC BLD AUTO-RTO: 0 /100 WBC (ref 0–0.2)
PLATELET # BLD AUTO: 301 10*3/MM3 (ref 140–450)
PMV BLD AUTO: 10 FL (ref 6–12)
POTASSIUM SERPL-SCNC: 4.1 MMOL/L (ref 3.5–5.2)
PROT SERPL-MCNC: 5.6 G/DL (ref 6–8.5)
RBC # BLD AUTO: 4.75 10*6/MM3 (ref 4.14–5.8)
SODIUM SERPL-SCNC: 136 MMOL/L (ref 136–145)
WBC # BLD AUTO: 9.83 10*3/MM3 (ref 3.4–10.8)

## 2021-02-15 PROCEDURE — 25010000002 ENOXAPARIN PER 10 MG: Performed by: INTERNAL MEDICINE

## 2021-02-15 PROCEDURE — 94660 CPAP INITIATION&MGMT: CPT

## 2021-02-15 PROCEDURE — 97110 THERAPEUTIC EXERCISES: CPT

## 2021-02-15 PROCEDURE — 99232 SBSQ HOSP IP/OBS MODERATE 35: CPT | Performed by: INTERNAL MEDICINE

## 2021-02-15 PROCEDURE — 94799 UNLISTED PULMONARY SVC/PX: CPT

## 2021-02-15 PROCEDURE — 97116 GAIT TRAINING THERAPY: CPT

## 2021-02-15 PROCEDURE — 83615 LACTATE (LD) (LDH) ENZYME: CPT | Performed by: INTERNAL MEDICINE

## 2021-02-15 PROCEDURE — 85379 FIBRIN DEGRADATION QUANT: CPT | Performed by: INTERNAL MEDICINE

## 2021-02-15 PROCEDURE — 85025 COMPLETE CBC W/AUTO DIFF WBC: CPT | Performed by: INTERNAL MEDICINE

## 2021-02-15 PROCEDURE — 85384 FIBRINOGEN ACTIVITY: CPT | Performed by: INTERNAL MEDICINE

## 2021-02-15 PROCEDURE — 80053 COMPREHEN METABOLIC PANEL: CPT | Performed by: INTERNAL MEDICINE

## 2021-02-15 RX ORDER — ECHINACEA PURPUREA EXTRACT 125 MG
1 TABLET ORAL AS NEEDED
Status: CANCELLED | OUTPATIENT
Start: 2021-02-15

## 2021-02-15 RX ADMIN — ZINC SULFATE 220 MG (50 MG) CAPSULE 220 MG: CAPSULE at 08:20

## 2021-02-15 RX ADMIN — ATORVASTATIN CALCIUM 10 MG: 10 TABLET, FILM COATED ORAL at 21:09

## 2021-02-15 RX ADMIN — FAMOTIDINE 20 MG: 20 TABLET, FILM COATED ORAL at 16:48

## 2021-02-15 RX ADMIN — FAMOTIDINE 20 MG: 20 TABLET, FILM COATED ORAL at 08:21

## 2021-02-15 RX ADMIN — DOCUSATE SODIUM 100 MG: 100 CAPSULE ORAL at 08:21

## 2021-02-15 RX ADMIN — ASPIRIN 81 MG: 81 TABLET, CHEWABLE ORAL at 08:21

## 2021-02-15 RX ADMIN — ALBUTEROL SULFATE 2 PUFF: 90 AEROSOL, METERED RESPIRATORY (INHALATION) at 06:40

## 2021-02-15 RX ADMIN — VERAPAMIL HYDROCHLORIDE 180 MG: 180 TABLET, FILM COATED, EXTENDED RELEASE ORAL at 08:20

## 2021-02-15 RX ADMIN — ENOXAPARIN SODIUM 40 MG: 40 INJECTION SUBCUTANEOUS at 08:20

## 2021-02-15 RX ADMIN — ALBUTEROL SULFATE 2 PUFF: 90 AEROSOL, METERED RESPIRATORY (INHALATION) at 16:48

## 2021-02-15 RX ADMIN — ALBUTEROL SULFATE 2 PUFF: 90 AEROSOL, METERED RESPIRATORY (INHALATION) at 12:30

## 2021-02-15 RX ADMIN — OXYCODONE HYDROCHLORIDE AND ACETAMINOPHEN 500 MG: 500 TABLET ORAL at 08:21

## 2021-02-15 RX ADMIN — ALBUTEROL SULFATE 2 PUFF: 90 AEROSOL, METERED RESPIRATORY (INHALATION) at 21:10

## 2021-02-15 RX ADMIN — SODIUM CHLORIDE, PRESERVATIVE FREE 10 ML: 5 INJECTION INTRAVENOUS at 21:09

## 2021-02-15 RX ADMIN — Medication 1000 UNITS: at 08:21

## 2021-02-15 RX ADMIN — OXYCODONE HYDROCHLORIDE AND ACETAMINOPHEN 500 MG: 500 TABLET ORAL at 21:09

## 2021-02-15 RX ADMIN — Medication 3 MG: at 21:09

## 2021-02-15 RX ADMIN — SALINE NASAL SPRAY 1 SPRAY: 1.5 SOLUTION NASAL at 12:29

## 2021-02-15 RX ADMIN — COLCHICINE 0.6 MG: 0.6 TABLET, FILM COATED ORAL at 08:21

## 2021-02-15 RX ADMIN — SODIUM CHLORIDE, PRESERVATIVE FREE 10 ML: 5 INJECTION INTRAVENOUS at 08:21

## 2021-02-15 RX ADMIN — HYDROCHLOROTHIAZIDE 25 MG: 25 TABLET ORAL at 08:21

## 2021-02-15 RX ADMIN — AMLODIPINE BESYLATE 5 MG: 5 TABLET ORAL at 08:21

## 2021-02-15 RX ADMIN — Medication 15 ML: at 08:20

## 2021-02-15 NOTE — PLAN OF CARE
"Goal Outcome Evaluation:  Plan of Care Reviewed With: patient  Progress: improving  Outcome Summary: Pt. agreeable to therapy. Pt. was Supervision for transfers and CGA for gait in room. He walked 30' x 2 with one sitting rest. Pt.\"s O2 sat dropped to 85-86% each time with activity while on 6L. His O2 sat quicklly increased back up to 90%. He participated with LE exercises and instructed to work on them independently. Will continue to work on balance and enndurance.   "

## 2021-02-15 NOTE — PROGRESS NOTES
[facility] Medicine Services  INPATIENT PROGRESS NOTE    Length of Stay: 13  Date of Admission: 2/2/2021  Primary Care Physician: Meredith Lloyd APRN    Subjective   Chief Complaint: Covid-19-pneumonia.  Respiratory failure hypoxia.    HPI   Patient is currently on 7 L of oxygen.  Patient denies any chest pain.  Shortness of breath increased with ambulating.    Review of Systems   Constitutional: Positive for activity change and appetite change. Negative for chills and fever.   HENT: Negative for hearing loss, nosebleeds, tinnitus and trouble swallowing.    Eyes: Negative for visual disturbance.   Respiratory: Positive for cough and shortness of breath. Negative for chest tightness and wheezing.    Cardiovascular: Negative for chest pain, palpitations and leg swelling.   Gastrointestinal: Negative for abdominal distention, abdominal pain, blood in stool, constipation, diarrhea, nausea and vomiting.   Endocrine: Negative for cold intolerance, heat intolerance, polydipsia, polyphagia and polyuria.   Genitourinary: Negative for decreased urine volume, difficulty urinating, dysuria, flank pain, frequency and hematuria.   Musculoskeletal: Negative for arthralgias, joint swelling and myalgias.   Skin: Negative for rash.   Allergic/Immunologic: Negative for immunocompromised state.   Neurological: Positive for weakness. Negative for dizziness, syncope, light-headedness and headaches.   Hematological: Negative for adenopathy. Does not bruise/bleed easily.   Psychiatric/Behavioral: Negative for confusion and sleep disturbance. The patient is not nervous/anxious.           All pertinent negatives and positives are as above. All other systems have been reviewed and are negative unless otherwise stated.     Objective    Temp:  [96.1 °F (35.6 °C)-98.4 °F (36.9 °C)] 97.8 °F (36.6 °C)  Heart Rate:  [59-90] 82  Resp:  [12-21] 15  BP: (116-156)/(56-76) 154/76    Intake/Output Summary  (Last 24 hours) at 2/15/2021 1213  Last data filed at 2/15/2021 0800  Gross per 24 hour   Intake 720 ml   Output 1450 ml   Net -730 ml     Physical Exam  Vitals signs and nursing note reviewed.   Constitutional:       Appearance: He is well-developed.   HENT:      Head: Normocephalic and atraumatic.   Eyes:      Conjunctiva/sclera: Conjunctivae normal.      Pupils: Pupils are equal, round, and reactive to light.   Neck:      Musculoskeletal: Neck supple.      Vascular: No JVD.   Cardiovascular:      Rate and Rhythm: Normal rate and regular rhythm.      Heart sounds: Normal heart sounds. No murmur. No friction rub. No gallop.    Pulmonary:      Effort: No respiratory distress.      Breath sounds: No wheezing or rales.      Comments: Diminished breath sound.  Clear.  Patient currently on 7 L of oxygen.  Chest:      Chest wall: No tenderness.   Abdominal:      General: Bowel sounds are normal. There is no distension.      Palpations: Abdomen is soft.      Tenderness: There is no abdominal tenderness. There is no guarding or rebound.      Comments: Obesity.   Musculoskeletal: Normal range of motion.         General: No tenderness or deformity.   Skin:     General: Skin is warm and dry.      Findings: No rash.   Neurological:      Mental Status: He is alert and oriented to person, place, and time.      Cranial Nerves: No cranial nerve deficit.      Motor: No abnormal muscle tone.      Deep Tendon Reflexes: Reflexes normal.   Psychiatric:         Behavior: Behavior normal.         Thought Content: Thought content normal.         Judgment: Judgment normal.         Results Review:  Lab Results (last 24 hours)     Procedure Component Value Units Date/Time    Comprehensive Metabolic Panel [[id number]]  (Abnormal) Collected: 02/15/21 0526    Specimen: Blood Updated: 02/15/21 0624     Glucose 93 mg/dL      BUN 20 mg/dL      Creatinine 0.64 mg/dL      Sodium 136 mmol/L      Potassium 4.1 mmol/L      Chloride 100 mmol/L      CO2  32.0 mmol/L      Calcium 8.3 mg/dL      Total Protein 5.6 g/dL      Albumin 2.70 g/dL      ALT (SGPT) 23 U/L      AST (SGOT) 16 U/L      Alkaline Phosphatase 55 U/L      Total Bilirubin 0.6 mg/dL      eGFR Non African Amer 123 mL/min/1.73      Globulin 2.9 gm/dL      A/G Ratio 0.9 g/dL      BUN/Creatinine Ratio 31.3     Anion Gap 4.0 mmol/L     Narrative:      GFR Normal >60  Chronic Kidney Disease <60  Kidney Failure <15      Lactate Dehydrogenase [[id number]]  (Abnormal) Collected: 02/15/21 0526    Specimen: Blood Updated: 02/15/21 0624      U/L     D-dimer, Quantitative [[id number]]  (Abnormal) Collected: 02/15/21 0526    Specimen: Blood Updated: 02/15/21 0616     D-Dimer, Quantitative 0.56 mg/L (FEU)     Narrative:      Reference Range is 0-0.50 mg/L FEU. However, results <0.50 mg/L FEU tends to rule out DVT or PE. Results >0.50 mg/L FEU are not useful in predicting absence or presence of DVT or PE.      Fibrinogen [[id number]]  (Normal) Collected: 02/15/21 0526    Specimen: Blood Updated: 02/15/21 0613     Fibrinogen 442 mg/dL     CBC & Differential [[id number]]  (Abnormal) Collected: 02/15/21 0526    Specimen: Blood Updated: 02/15/21 0606    Narrative:      The following orders were created for panel order CBC & Differential.  Procedure                               Abnormality         Status                     ---------                               -----------         ------                     CBC Auto Differential[[id number]]        Abnormal            Final result                 Please view results for these tests on the individual orders.    CBC Auto Differential [[id number]]  (Abnormal) Collected: 02/15/21 0526    Specimen: Blood Updated: 02/15/21 0606     WBC 9.83 10*3/mm3      RBC 4.75 10*6/mm3      Hemoglobin 14.7 g/dL      Hematocrit 43.3 %      MCV 91.2 fL      MCH 30.9 pg      MCHC 33.9 g/dL      RDW 13.2 %      RDW-SD 44.4 fl      MPV 10.0 fL      Platelets 301 10*3/mm3      Neutrophil % 85.0  %      Lymphocyte % 6.3 %      Monocyte % 7.7 %      Eosinophil % 0.1 %      Basophil % 0.1 %      Immature Grans % 0.8 %      Neutrophils, Absolute 8.35 10*3/mm3      Lymphocytes, Absolute 0.62 10*3/mm3      Monocytes, Absolute 0.76 10*3/mm3      Eosinophils, Absolute 0.01 10*3/mm3      Basophils, Absolute 0.01 10*3/mm3      Immature Grans, Absolute 0.08 10*3/mm3      nRBC 0.0 /100 WBC            Cultures:  No results found for: BLOODCX, URINECX, WOUNDCX, MRSACX, RESPCX, STOOLCX    Radiology Data:    Imaging Results (Last 24 Hours)     Procedure Component Value Units Date/Time    XR Chest 1 View [[id number]] Collected: 02/14/21 1300     Updated: 02/14/21 1304    Narrative:      EXAMINATION: Chest 1 view 2/14/2021     HISTORY: Covid 19 pneumonia     FINDINGS: Today's exam is compared to previous study of 6 days earlier.  There is persistent bilateral mixed interstitial and alveolar pneumonia.  There is increased consolidation within the lung bases with partial  silhouetting of the diaphragms. The cardiac silhouette is mildly  enlarged. There is no free air beneath the hemidiaphragms.       Impression:      1.. Bibasilar pneumonia. This shows some increased consolidation  compared to the previous exam of 6 days earlier.  This report was finalized on 02/14/2021 13:01 by Dr. Kang Wilkes MD.          No Known Allergies    Scheduled meds:   albuterol sulfate HFA, 2 puff, Inhalation, 4x Daily - RT  amLODIPine, 5 mg, Oral, Q24H  ascorbic acid, 500 mg, Oral, BID  aspirin, 81 mg, Oral, Daily  atorvastatin, 10 mg, Oral, Nightly  cholecalciferol, 1,000 Units, Oral, Daily  colchicine, 0.6 mg, Oral, Daily  docusate sodium, 100 mg, Oral, Daily  enoxaparin, 40 mg, Subcutaneous, Q24H  famotidine, 20 mg, Oral, BID AC  hydroCHLOROthiazide, 25 mg, Oral, Daily  melatonin, 3 mg, Oral, Nightly  multivitamin and minerals, 15 mL, Oral, Daily  polyethylene glycol, 17 g, Oral, Daily  sodium chloride, 10 mL, Intravenous, Q12H  verapamil  SR, 180 mg, Oral, Q24H  zinc sulfate, 220 mg, Oral, Daily        PRN meds:  •  acetaminophen **OR** acetaminophen  •  benzonatate  •  dextromethorphan polistirex ER  •  labetalol  •  ondansetron **OR** ondansetron  •  sodium chloride  •  sodium chloride    Assessment/Plan       Essential hypertension    Pneumonia due to COVID-19 virus    Acute respiratory failure with hypoxia (CMS/HCC)    Obesity (BMI 30-39.9)      Plan:  COVID-19 pneumonia/respiratory failure with hypoxia.  Previously was on antibiotics and Decadron outpatient.  Zinc.  Vitamin C.  Vitamin D.  Melatonin at night. Continue Decadron.  Continue remdesivir.  S/P Convalescent plasma x1.  Tessalon Perle.  Delsym.  Consult pulmonary.  Consult infectious disease.  Cont colchicine.    CT scan and [facility] at [facility] -bilateral infiltrate consistent with Covid.  D-dimer was elevated, no PE per CTA.  Chest x-ray-. Bibasilar pneumonia, some increased consolidation compared to the previous exam of 6 days earlier.  Patient is on 7 L of oxygen.     Hypertension/diastolic heart failure.  Continue Norvasc.  Continue hydrochlorthiazide.  Continue Lipitor.  Labetalol as needed.    Echocardiogram-ejection fraction 56 to 60%, diastolic dysfunction grade 1, left ventricle cavity moderately dilated, moderate concentric hypertrophy, mild aortic valve stenosis.     Reflux.  Pepcid.  Zofran as needed.     Obesity.  BMI is 38.     History colon cancer.     Lovenox prophylaxis.     Nutrition.  Regular/cardiac diet.     Blood cultures-no growth 5 days.  Legionella antigen-negative.  Respiratory culture- normal resp cb.  Strep pneumo-negative.  Mycoplasma-pending.     Discharge Planning: 3 to 6 days.    Electronically signed by Aime Rivera MD, 02/15/21, 12:13 PM CST.

## 2021-02-15 NOTE — PLAN OF CARE
Goal Outcome Evaluation:  Plan of Care Reviewed With: patient  Progress: improving  Outcome Summary: Pt weaned to 6L hgih flow cannula. O2 sats drop to 85-86% with activity. Bipap on while asleep. VSS. Enhanced isolation precautions continued.   no

## 2021-02-15 NOTE — PROGRESS NOTES
"    PULMONARY AND CRITICAL CARE PROGRESS NOTE - [facility]    Patient: Santiago Spence  1950   MR# [id number]   Acct# [id number]  02/15/21   11:25 CST  Referring Provider: Aime Rivera MD    Chief Complaint: Covid-19 pneumonia, acute respiratory failure    Interval history:   Discussed with nursing staff.  He is down to 6-7 L HF and seems to be doing well.  Working with incentive spirometry and aerobika. RN states they tried to wean to regular flow NC and desaturated. He states he is still on the \"green\" tubing. He states his appetite is good. Bowel movements and urination are good. He has been up moving in room and up to the chair. He feels he is breathing much better. He is coughing very little.     Meds:  albuterol sulfate HFA, 2 puff, Inhalation, 4x Daily - RT  amLODIPine, 5 mg, Oral, Q24H  ascorbic acid, 500 mg, Oral, BID  aspirin, 81 mg, Oral, Daily  atorvastatin, 10 mg, Oral, Nightly  cholecalciferol, 1,000 Units, Oral, Daily  colchicine, 0.6 mg, Oral, Daily  docusate sodium, 100 mg, Oral, Daily  enoxaparin, 40 mg, Subcutaneous, Q24H  famotidine, 20 mg, Oral, BID AC  hydroCHLOROthiazide, 25 mg, Oral, Daily  melatonin, 3 mg, Oral, Nightly  multivitamin and minerals, 15 mL, Oral, Daily  polyethylene glycol, 17 g, Oral, Daily  sodium chloride, 10 mL, Intravenous, Q12H  verapamil SR, 180 mg, Oral, Q24H  zinc sulfate, 220 mg, Oral, Daily         Review of Systems:   Review of Systems - General ROS: negative for - chills, fatigue or fever  Respiratory ROS: no cough, shortness of breath, or wheezing  Cardiovascular ROS: no chest pain or dyspnea on exertion    Physical Exam:  SpO2 Percentage    02/15/21 0637 02/15/21 0700 02/15/21 0818   SpO2: 95% 90% 93%     Temp:  [96.1 °F (35.6 °C)-98.4 °F (36.9 °C)] 96.9 °F (36.1 °C)  Heart Rate:  [59-90] 87  Resp:  [12-21] 13  BP: (116-156)/(56-76) 148/76    Intake/Output Summary (Last 24 hours) at 2/15/2021 1125  Last data filed at 2/14/2021 2640  Gross per " 24 hour   Intake 480 ml   Output 1450 ml   Net -970 ml     Vital signs reviewed: Assessment based on discussion with patient and nursing   GENERAL/CONSTITUTIONAL: no distress.   HEENT: atraumatic, normocephalic  NOSE: normal  NECK: jugular veins nondistended  CHEST: no paradox, no retractions.  No respiratory distress.   CARDIAC: regular rhythm  ABDOMEN: nondistended  : deferred  EXTREMITIES: no edema.  NEURO:  Alert and oriented x 3  SKIN: no jaundice.  No rash  Laboratory Data:  Results from last 7 days   Lab Units 02/15/21  0526 02/14/21  0529 02/13/21  0641   WBC 10*3/mm3 9.83 9.49 10.70   HEMOGLOBIN g/dL 14.7 14.6 15.2   PLATELETS 10*3/mm3 301 310 346     Results from last 7 days   Lab Units 02/15/21  0526 02/14/21  0529 02/13/21  0641  02/10/21  0241   SODIUM mmol/L 136 138 133*   < > 137   POTASSIUM mmol/L 4.1 4.3 4.1   < > 4.7   BUN mg/dL 20 22 19   < > 22   CREATININE mg/dL 0.64* 0.66* 0.55*   < > 0.71*   CRP mg/dL  --   --   --   --  0.24   FERRITIN ng/mL  --   --   --   --  543.50*   D DIMER QUANT mg/L (FEU) 0.56*  --  1.15*   < >  --     < > = values in this interval not displayed.         Blood Culture   Date Value Ref Range Status   02/02/2021 No growth at 2 days  Preliminary   02/02/2021 No growth at 2 days  Preliminary     Respiratory Culture   Date Value Ref Range Status   02/02/2021   Preliminary    Heavy growth (4+) Normal Respiratory Jazz: NO S.aureus/MRSA or Pseudomonas aeruginosa     Recent films:  Xr Chest 1 View    Result Date: 2/14/2021  1.. Bibasilar pneumonia. This shows some increased consolidation compared to the previous exam of 6 days earlier. This report was finalized on 02/14/2021 13:01 by Dr. Kang Wilkes MD.    Films reviewed personally by me.  My interpretation: None today 02/15/2021    Pulmonary Assessment:    1. SARS Covid-19 viral pneumonia -remdesivir completed  2. Acute respiratory failure with hypoxia related to COVID-19  3. Bilateral lung infiltrates  4. History of  colon cancer  5. Obese  6. Obstructive sleep apnea, does not use his CPAP    Recommend:     · Continue high flow cannula 6L and wean down oxygen to keep sats above 88%  · Proning candidate: Yes, if able to  · Completed 10 days of Decadron.    · DVT prophylaxis-Lovenox 40 mg daily  · Stress ulcer prophylaxis-Pepcid twice daily  · Zinc, vitamin D, vitamin C, and melatonin  · Antibiotic-Merrem completed  · IS/OPEP, albuterol HFA  · Nutrition  · Mobilization     Electronically signed by: VALERIA Bernard 2/15/2021 11:25 CST      ATTESTATION OF CLINICAL NOTE:  I have reviewed the notes, assessments, and/or procedures performed by VALERIA Martinez, I concur with her/his documentation of Santiago Spence.  Patient was seen in the follow-up visit in [facility] isolation unit with advanced audiovisual technique with Zoom.  He was seen from outside the room to reduce the risk of cross infection exposure and to minimize use of PPE.    Patient was seen in the follow-up visit in pulmonary rounds in [place] [address] isolation unit today.  He was seen and followed by me last week.  He has COVID-19 pneumonia.  He completed treatment with remdesivir and dexamethasone and is still on adjunct treatment.  He completed antibiotics.  He is doing well overall and is currently requiring 6 L high flow oxygen but did not need any Vapotherm.  He is using BiPAP at night.  He was doing well overall.  His cough had improved and he had no other new complaints.    On physical examination per nursing assessment patient is elderly  gentleman appears comfortable.  HEENT atraumatic normocephalic.  Neck: Supple.  Heart: Sounds normal regular rhythm.  Lungs: Bibasilar crackles and diminished breath sound.  Abdomen: Soft nondistended.  Extremities: No edema normal pulses and color.  Neurologic: Grossly intact.  Skin: No breakdown.    Continue current treatment plan and supportive respiratory care.  Keep titrating oxygen down to maintain  oxygen saturation more than 92%.  Continue albuterol HFA as needed and incentive spirometry and flutter valve.  Continue treatment for COVID-19 with adjunct treatment and he already completed remdesivir and Decadron and also finished antibiotics.  Physical activity as tolerated.  Nutritional support.  DVT and stress ulcer prophylaxis.  Plan for outpatient follow-up in pulmonary clinic after discharge for sleep apnea.  Plan for home oxygen evaluation prior to discharge.  Repeat labs and imaging studies from time to time.  Pulmonary team will continue following and make further recommendations.    I have seen and examined patient personally, performing a face-to-face diagnostic evaluation with plan of care reviewed and developed with APRN and nursing staff. I have addended and/or modified the above history of present illness, physical examination, and assessment and plan to reflect my findings and impressions. Essential elements of the care plan were discussed with APRN above.  Agree with findings and assessment/plan as documented above.    Cora Ewing MD  Pulmonologist/Intensivist  2/15/2021 11:58 CST

## 2021-02-15 NOTE — THERAPY TREATMENT NOTE
Acute Care - Physical Therapy Treatment Note  [facility]     Patient Name: Santiago Spence  : 1950  MRN: [id number]  Today's Date: 2/15/2021           PT Assessment (last 12 hours)      PT Evaluation and Treatment     [facility] Name 02/15/21 1040          Physical Therapy Time and Intention    Subjective Information  complains of;weakness;dyspnea  -     Document Type  therapy note (daily note)  -     Mode of Treatment  physical therapy  -[facility] Name 02/15/21 1040          General Information    Existing Precautions/Restrictions  fall;oxygen therapy device and L/min  -     Row Name 02/15/21 1040          Pain Scale: Numbers Pre/Post-Treatment    Pretreatment Pain Rating  0/10 - no pain  -[facility] Name 02/15/21 1040          Bed Mobility    Comment (Bed Mobility)  in chair  -[facility] Name 02/15/21 1040          Transfers    Comment (Transfers)  stood x 2  -     Sit-Stand Amite (Transfers)  supervision  -     Stand-Sit Amite (Transfers)  supervision  -[facility] Name 02/15/21 1040          Gait/Stairs (Locomotion)    Amite Level (Gait)  verbal cues;contact guard  -     Assistive Device (Gait)  -- HHA  -     Distance in Feet (Gait)  30 x 2 with sittng rest  -     Deviations/Abnormal Patterns (Gait)  stride length decreased;nicolle decreased  -     Comment (Gait/Stairs)  pt slightly unsteady-required HHA for balance.   -[facility] Name 02/15/21 1040          Hip (Therapeutic Exercise)    Hip (Therapeutic Exercise)  AROM (active range of motion)  -     Hip AROM (Therapeutic Exercise)  bilateral;flexion;aBduction;aDduction;sitting 20  -[facility] Name 02/15/21 1040          Knee (Therapeutic Exercise)    Knee (Therapeutic Exercise)  AROM (active range of motion)  -     Knee AROM (Therapeutic Exercise)  bilateral;LAQ (long arc quad);sitting 20  -[facility] Name 02/15/21 1040          Ankle (Therapeutic Exercise)    Ankle (Therapeutic Exercise)  AROM (active range of motion)  " -MF     Ankle AROM (Therapeutic Exercise)  bilateral;dorsiflexion;sitting 20  -MF     Row Name 02/15/21 1040          Plan of Care Review    Plan of Care Reviewed With  patient  -MF     Progress  improving  -     Outcome Summary  Pt. agreeable to therapy. Pt. was Supervision for transfers and CGA for gait in room. He walked 30' x 2 with one sitting rest. Pt.\"s O2 sat dropped to 85-86% each time with activity while on 6L. His O2 sat quicklly increased back up to 90%. He participated with LE exercises and instructed to work on them independently. Will continue to work on balance and enndurance.   -     Row Name 02/15/21 1040          Vital Signs    Pre SpO2 (%)  93  -MF     O2 Delivery Pre Treatment  supplemental O2 6l  -MF     Intra SpO2 (%)  87  -MF     O2 Delivery Intra Treatment  supplemental O2 6l  -MF     Post SpO2 (%)  91  -MF     O2 Delivery Post Treatment  supplemental O2 6l  -MF     Pre Patient Position  Sitting  -MF     Intra Patient Position  Standing  -MF     Post Patient Position  Sitting  -     Row Name 02/15/21 1040          Positioning and Restraints    Pre-Treatment Position  sitting in chair/recliner  -MF     Post Treatment Position  chair  -MF     In Chair  sitting;call light within reach;encouraged to call for assist  -       User Key  (r) = Recorded By, (t) = Taken By, (c) = Cosigned By    Initials Name Provider Type    Dayana Mcginnis, PTA Physical Therapy Assistant        Physical Therapy Education                 Title: PT OT SLP Therapies (In Progress)     Topic: Physical Therapy (In Progress)     Point: Mobility training (Done)     Learning Progress Summary           Patient Acceptance, MIGUEL KASPER DU by MARY at 2/12/2021 1100    Comment: Progression of PT POC and benefits of activity                   Point: Home exercise program (Not Started)     Learner Progress:  Not documented in this visit.          Point: Body mechanics (Not Started)     Learner Progress:  Not documented in " "this visit.          Point: Precautions (Not Started)     Learner Progress:  Not documented in this visit.                      User Key     Initials Effective Dates Name Provider Type Discipline    MARY 08/02/16 -  Henry Alvarez PT DPT Physical Therapist PT              PT Recommendation and Plan     Plan of Care Reviewed With: patient  Progress: improving  Outcome Summary: Pt. agreeable to therapy. Pt. was Supervision for transfers and CGA for gait in room. He walked 30' x 2 with one sitting rest. Pt.\"s O2 sat dropped to 85-86% each time with activity while on 6L. His O2 sat quicklly increased back up to 90%. He participated with LE exercises and instructed to work on them independently. Will continue to work on balance and enndurance.        Time Calculation:   PT Charges     Row Name 02/15/21 1310             Time Calculation    Start Time  1040  -      Stop Time  1105  -      Time Calculation (min)  25 min  -      PT Received On  02/15/21  -      PT Goal Re-Cert Due Date  02/22/21  -         Time Calculation- PT    Total Timed Code Minutes- PT  25 minute(s)  -         Timed Charges    51432 - PT Therapeutic Exercise Minutes  12  -MF      70709 - Gait Training Minutes   13  -MF        User Key  (r) = Recorded By, (t) = Taken By, (c) = Cosigned By    Initials Name Provider Type     Dayana Malhotra PTA Physical Therapy Assistant        Therapy Charges for Today     Code Description Service Date Service Provider Modifiers Qty    67561293119 HC PT THER PROC EA 15 MIN 2/15/2021 Dayana Malhotra PTA GP 1    29915656367 HC GAIT TRAINING EA 15 MIN 2/15/2021 Dayana Malhotra PTA GP 1          PT G-Codes  Outcome Measure Options: AM-PAC 6 Clicks Basic Mobility (PT)  AM-PAC 6 Clicks Score (PT): 18    Dayana Malhotra PTA  2/15/2021    "

## 2021-02-15 NOTE — PROGRESS NOTES
Continued Stay Note  [facility]     Patient Name: Santiago Spence  MRN: [id number]  Today's Date: 2/15/2021    Admit Date: 2/2/2021    Discharge Plan     Row Name 02/15/21 0823       Plan    Plan  LTAC vs Home    Plan Comments  Pt remains on high flow O2, if felt could take awhile would suggest LTAC consult.  Pt did well with P.T., feels he can return home with wife when O2 levels appropriate. Will follow.        Discharge Codes    No documentation.             SOFI Patel

## 2021-02-16 ENCOUNTER — HOSPITAL ENCOUNTER (OUTPATIENT)
Facility: HOSPITAL | Age: 71
Discharge: HOME | End: 2021-02-26
Attending: INTERNAL MEDICINE | Admitting: INTERNAL MEDICINE

## 2021-02-16 VITALS
OXYGEN SATURATION: 93 % | WEIGHT: 203.04 LBS | DIASTOLIC BLOOD PRESSURE: 75 MMHG | SYSTOLIC BLOOD PRESSURE: 136 MMHG | HEART RATE: 95 BPM | BODY MASS INDEX: 34.66 KG/M2 | HEIGHT: 64 IN | TEMPERATURE: 96.8 F | RESPIRATION RATE: 20 BRPM

## 2021-02-16 PROBLEM — D89.834 CYTOKINE RELEASE SYNDROME, GRADE 4: Status: ACTIVE | Noted: 2021-02-16

## 2021-02-16 PROBLEM — D89.832 CYTOKINE RELEASE SYNDROME, GRADE 2: Status: ACTIVE | Noted: 2021-02-16

## 2021-02-16 PROBLEM — D89.833 CYTOKINE RELEASE SYNDROME, GRADE 3: Status: ACTIVE | Noted: 2021-02-16

## 2021-02-16 PROBLEM — D89.831 CYTOKINE RELEASE SYNDROME, GRADE 1: Status: ACTIVE | Noted: 2021-02-16

## 2021-02-16 LAB
ALBUMIN SERPL-MCNC: 3.1 G/DL (ref 3.5–5.2)
ALBUMIN/GLOB SERPL: 1.4 G/DL
ALP SERPL-CCNC: 64 U/L (ref 39–117)
ALT SERPL W P-5'-P-CCNC: 32 U/L (ref 1–41)
ANION GAP SERPL CALCULATED.3IONS-SCNC: 6 MMOL/L (ref 5–15)
AST SERPL-CCNC: 18 U/L (ref 1–40)
BASOPHILS # BLD AUTO: 0.01 10*3/MM3 (ref 0–0.2)
BASOPHILS NFR BLD AUTO: 0.1 % (ref 0–1.5)
BILIRUB SERPL-MCNC: 0.7 MG/DL (ref 0–1.2)
BUN SERPL-MCNC: 23 MG/DL (ref 8–23)
BUN/CREAT SERPL: 37.1 (ref 7–25)
CALCIUM SPEC-SCNC: 8.3 MG/DL (ref 8.6–10.5)
CHLORIDE SERPL-SCNC: 100 MMOL/L (ref 98–107)
CO2 SERPL-SCNC: 31 MMOL/L (ref 22–29)
CREAT SERPL-MCNC: 0.62 MG/DL (ref 0.76–1.27)
DEPRECATED RDW RBC AUTO: 45.2 FL (ref 37–54)
EOSINOPHIL # BLD AUTO: 0.07 10*3/MM3 (ref 0–0.4)
EOSINOPHIL NFR BLD AUTO: 0.7 % (ref 0.3–6.2)
ERYTHROCYTE [DISTWIDTH] IN BLOOD BY AUTOMATED COUNT: 13.4 % (ref 12.3–15.4)
GFR SERPL CREATININE-BSD FRML MDRD: 128 ML/MIN/1.73
GLOBULIN UR ELPH-MCNC: 2.2 GM/DL
GLUCOSE SERPL-MCNC: 85 MG/DL (ref 65–99)
HCT VFR BLD AUTO: 45.3 % (ref 37.5–51)
HGB BLD-MCNC: 15.1 G/DL (ref 13–17.7)
IMM GRANULOCYTES # BLD AUTO: 0.09 10*3/MM3 (ref 0–0.05)
IMM GRANULOCYTES NFR BLD AUTO: 0.9 % (ref 0–0.5)
LYMPHOCYTES # BLD AUTO: 0.8 10*3/MM3 (ref 0.7–3.1)
LYMPHOCYTES NFR BLD AUTO: 8.2 % (ref 19.6–45.3)
MCH RBC QN AUTO: 30.4 PG (ref 26.6–33)
MCHC RBC AUTO-ENTMCNC: 33.3 G/DL (ref 31.5–35.7)
MCV RBC AUTO: 91.3 FL (ref 79–97)
MONOCYTES # BLD AUTO: 0.91 10*3/MM3 (ref 0.1–0.9)
MONOCYTES NFR BLD AUTO: 9.3 % (ref 5–12)
NEUTROPHILS NFR BLD AUTO: 7.92 10*3/MM3 (ref 1.7–7)
NEUTROPHILS NFR BLD AUTO: 80.8 % (ref 42.7–76)
NRBC BLD AUTO-RTO: 0 /100 WBC (ref 0–0.2)
PLATELET # BLD AUTO: 295 10*3/MM3 (ref 140–450)
PMV BLD AUTO: 10 FL (ref 6–12)
POTASSIUM SERPL-SCNC: 3.9 MMOL/L (ref 3.5–5.2)
PROT SERPL-MCNC: 5.3 G/DL (ref 6–8.5)
RBC # BLD AUTO: 4.96 10*6/MM3 (ref 4.14–5.8)
SODIUM SERPL-SCNC: 137 MMOL/L (ref 136–145)
WBC # BLD AUTO: 9.8 10*3/MM3 (ref 3.4–10.8)

## 2021-02-16 PROCEDURE — 99232 SBSQ HOSP IP/OBS MODERATE 35: CPT | Performed by: INTERNAL MEDICINE

## 2021-02-16 PROCEDURE — 85025 COMPLETE CBC W/AUTO DIFF WBC: CPT | Performed by: INTERNAL MEDICINE

## 2021-02-16 PROCEDURE — 94660 CPAP INITIATION&MGMT: CPT

## 2021-02-16 PROCEDURE — 97116 GAIT TRAINING THERAPY: CPT

## 2021-02-16 PROCEDURE — 25010000002 ENOXAPARIN PER 10 MG: Performed by: INTERNAL MEDICINE

## 2021-02-16 PROCEDURE — 80053 COMPREHEN METABOLIC PANEL: CPT | Performed by: INTERNAL MEDICINE

## 2021-02-16 PROCEDURE — 94799 UNLISTED PULMONARY SVC/PX: CPT

## 2021-02-16 RX ORDER — ATORVASTATIN CALCIUM 10 MG/1
10 TABLET, FILM COATED ORAL NIGHTLY
Start: 2021-02-16

## 2021-02-16 RX ORDER — ACETAMINOPHEN 650 MG/1
650 SUPPOSITORY RECTAL EVERY 4 HOURS PRN
Status: DISCONTINUED | OUTPATIENT
Start: 2021-02-16 | End: 2021-02-26 | Stop reason: HOSPADM

## 2021-02-16 RX ORDER — ATORVASTATIN CALCIUM 10 MG/1
10 TABLET, FILM COATED ORAL NIGHTLY
Status: DISCONTINUED | OUTPATIENT
Start: 2021-02-16 | End: 2021-02-26 | Stop reason: HOSPADM

## 2021-02-16 RX ORDER — POLYETHYLENE GLYCOL 3350 17 G/17G
17 POWDER, FOR SOLUTION ORAL DAILY
Start: 2021-02-17

## 2021-02-16 RX ORDER — MELATONIN
1000 DAILY
Start: 2021-02-17

## 2021-02-16 RX ORDER — ZINC SULFATE 50(220)MG
220 CAPSULE ORAL DAILY
Status: DISCONTINUED | OUTPATIENT
Start: 2021-02-17 | End: 2021-02-26 | Stop reason: HOSPADM

## 2021-02-16 RX ORDER — BENZONATATE 200 MG/1
200 CAPSULE ORAL EVERY 4 HOURS PRN
Start: 2021-02-16

## 2021-02-16 RX ORDER — MULTIVIT AND MINERALS-FERROUS GLUCONATE 9 MG IRON/15 ML ORAL LIQUID 9 MG/15 ML
15 LIQUID (ML) ORAL DAILY
Status: DISCONTINUED | OUTPATIENT
Start: 2021-02-17 | End: 2021-02-22

## 2021-02-16 RX ORDER — AMLODIPINE BESYLATE 5 MG/1
5 TABLET ORAL
Start: 2021-02-17 | End: 2021-02-16 | Stop reason: HOSPADM

## 2021-02-16 RX ORDER — BENZONATATE 100 MG/1
200 CAPSULE ORAL EVERY 4 HOURS PRN
Status: DISCONTINUED | OUTPATIENT
Start: 2021-02-16 | End: 2021-02-26 | Stop reason: HOSPADM

## 2021-02-16 RX ORDER — DOCUSATE SODIUM 100 MG/1
100 CAPSULE, LIQUID FILLED ORAL DAILY
Status: DISCONTINUED | OUTPATIENT
Start: 2021-02-17 | End: 2021-02-26 | Stop reason: HOSPADM

## 2021-02-16 RX ORDER — AMLODIPINE BESYLATE 5 MG/1
5 TABLET ORAL
Status: DISCONTINUED | OUTPATIENT
Start: 2021-02-17 | End: 2021-02-26 | Stop reason: HOSPADM

## 2021-02-16 RX ORDER — DEXTROMETHORPHAN POLISTIREX 30 MG/5ML
60 SUSPENSION ORAL EVERY 12 HOURS PRN
Qty: 280 ML
Start: 2021-02-16

## 2021-02-16 RX ORDER — POLYETHYLENE GLYCOL 3350 17 G/17G
17 POWDER, FOR SOLUTION ORAL DAILY
Status: DISCONTINUED | OUTPATIENT
Start: 2021-02-17 | End: 2021-02-26 | Stop reason: HOSPADM

## 2021-02-16 RX ORDER — COLCHICINE 0.6 MG/1
0.6 TABLET ORAL DAILY
Status: DISCONTINUED | OUTPATIENT
Start: 2021-02-17 | End: 2021-02-26 | Stop reason: HOSPADM

## 2021-02-16 RX ORDER — DEXTROMETHORPHAN POLISTIREX 30 MG/5ML
60 SUSPENSION ORAL EVERY 12 HOURS PRN
Status: DISCONTINUED | OUTPATIENT
Start: 2021-02-16 | End: 2021-02-26 | Stop reason: HOSPADM

## 2021-02-16 RX ORDER — ONDANSETRON 2 MG/ML
4 INJECTION INTRAMUSCULAR; INTRAVENOUS EVERY 6 HOURS PRN
Status: DISCONTINUED | OUTPATIENT
Start: 2021-02-16 | End: 2021-02-26 | Stop reason: HOSPADM

## 2021-02-16 RX ORDER — LABETALOL HYDROCHLORIDE 5 MG/ML
10 INJECTION, SOLUTION INTRAVENOUS EVERY 4 HOURS PRN
Status: DISCONTINUED | OUTPATIENT
Start: 2021-02-16 | End: 2021-02-26 | Stop reason: HOSPADM

## 2021-02-16 RX ORDER — SODIUM CHLORIDE 0.9 % (FLUSH) 0.9 %
10 SYRINGE (ML) INJECTION AS NEEDED
Status: DISCONTINUED | OUTPATIENT
Start: 2021-02-16 | End: 2021-02-26 | Stop reason: HOSPADM

## 2021-02-16 RX ORDER — ASCORBIC ACID 500 MG
500 TABLET ORAL 2 TIMES DAILY
Start: 2021-02-16

## 2021-02-16 RX ORDER — ASCORBIC ACID 500 MG
500 TABLET ORAL 2 TIMES DAILY
Status: DISCONTINUED | OUTPATIENT
Start: 2021-02-16 | End: 2021-02-26 | Stop reason: HOSPADM

## 2021-02-16 RX ORDER — ECHINACEA PURPUREA EXTRACT 125 MG
1 TABLET ORAL AS NEEDED
Refills: 12
Start: 2021-02-16

## 2021-02-16 RX ORDER — PSEUDOEPHEDRINE HCL 30 MG
100 TABLET ORAL DAILY
Start: 2021-02-17

## 2021-02-16 RX ORDER — COLCHICINE 0.6 MG/1
0.6 TABLET ORAL DAILY
Start: 2021-02-17

## 2021-02-16 RX ORDER — ASPIRIN 81 MG/1
81 TABLET, CHEWABLE ORAL DAILY
Status: DISCONTINUED | OUTPATIENT
Start: 2021-02-17 | End: 2021-02-26 | Stop reason: HOSPADM

## 2021-02-16 RX ORDER — ONDANSETRON 4 MG/1
4 TABLET, FILM COATED ORAL EVERY 6 HOURS PRN
Status: DISCONTINUED | OUTPATIENT
Start: 2021-02-16 | End: 2021-02-26 | Stop reason: HOSPADM

## 2021-02-16 RX ORDER — LANOLIN ALCOHOL/MO/W.PET/CERES
3 CREAM (GRAM) TOPICAL NIGHTLY
Start: 2021-02-16

## 2021-02-16 RX ORDER — SODIUM CHLORIDE 0.9 % (FLUSH) 0.9 %
10 SYRINGE (ML) INJECTION EVERY 12 HOURS SCHEDULED
Status: DISCONTINUED | OUTPATIENT
Start: 2021-02-16 | End: 2021-02-26 | Stop reason: HOSPADM

## 2021-02-16 RX ORDER — ZINC SULFATE 50(220)MG
220 CAPSULE ORAL DAILY
Start: 2021-02-17

## 2021-02-16 RX ORDER — LANOLIN ALCOHOL/MO/W.PET/CERES
3 CREAM (GRAM) TOPICAL NIGHTLY
Status: DISCONTINUED | OUTPATIENT
Start: 2021-02-16 | End: 2021-02-26 | Stop reason: HOSPADM

## 2021-02-16 RX ORDER — ACETAMINOPHEN 325 MG/1
650 TABLET ORAL EVERY 4 HOURS PRN
Status: DISCONTINUED | OUTPATIENT
Start: 2021-02-16 | End: 2021-02-26 | Stop reason: HOSPADM

## 2021-02-16 RX ORDER — HYDROCHLOROTHIAZIDE 25 MG/1
25 TABLET ORAL DAILY
Status: DISCONTINUED | OUTPATIENT
Start: 2021-02-17 | End: 2021-02-26 | Stop reason: HOSPADM

## 2021-02-16 RX ORDER — FAMOTIDINE 20 MG/1
20 TABLET, FILM COATED ORAL
Start: 2021-02-16

## 2021-02-16 RX ORDER — FAMOTIDINE 20 MG/1
20 TABLET, FILM COATED ORAL
Status: DISCONTINUED | OUTPATIENT
Start: 2021-02-16 | End: 2021-02-26 | Stop reason: HOSPADM

## 2021-02-16 RX ORDER — ALBUTEROL SULFATE 90 UG/1
2 AEROSOL, METERED RESPIRATORY (INHALATION)
Status: DISCONTINUED | OUTPATIENT
Start: 2021-02-16 | End: 2021-02-26 | Stop reason: HOSPADM

## 2021-02-16 RX ORDER — ECHINACEA PURPUREA EXTRACT 125 MG
1 TABLET ORAL AS NEEDED
Status: DISCONTINUED | OUTPATIENT
Start: 2021-02-16 | End: 2021-02-26 | Stop reason: HOSPADM

## 2021-02-16 RX ORDER — MELATONIN
1000 DAILY
Status: DISCONTINUED | OUTPATIENT
Start: 2021-02-17 | End: 2021-02-26 | Stop reason: HOSPADM

## 2021-02-16 RX ADMIN — AMLODIPINE BESYLATE 5 MG: 5 TABLET ORAL at 08:22

## 2021-02-16 RX ADMIN — ZINC SULFATE 220 MG (50 MG) CAPSULE 220 MG: CAPSULE at 08:22

## 2021-02-16 RX ADMIN — ALBUTEROL SULFATE 2 PUFF: 90 AEROSOL, METERED RESPIRATORY (INHALATION) at 12:39

## 2021-02-16 RX ADMIN — FAMOTIDINE 20 MG: 20 TABLET, FILM COATED ORAL at 08:23

## 2021-02-16 RX ADMIN — ENOXAPARIN SODIUM 40 MG: 40 INJECTION SUBCUTANEOUS at 08:22

## 2021-02-16 RX ADMIN — FAMOTIDINE 20 MG: 20 TABLET, FILM COATED ORAL at 16:51

## 2021-02-16 RX ADMIN — VERAPAMIL HYDROCHLORIDE 180 MG: 180 TABLET, FILM COATED, EXTENDED RELEASE ORAL at 08:22

## 2021-02-16 RX ADMIN — SODIUM CHLORIDE, PRESERVATIVE FREE 10 ML: 5 INJECTION INTRAVENOUS at 08:23

## 2021-02-16 RX ADMIN — ALBUTEROL SULFATE 2 PUFF: 90 AEROSOL, METERED RESPIRATORY (INHALATION) at 16:51

## 2021-02-16 RX ADMIN — OXYCODONE HYDROCHLORIDE AND ACETAMINOPHEN 500 MG: 500 TABLET ORAL at 08:22

## 2021-02-16 RX ADMIN — ASPIRIN 81 MG: 81 TABLET, CHEWABLE ORAL at 08:23

## 2021-02-16 RX ADMIN — Medication 1000 UNITS: at 08:22

## 2021-02-16 RX ADMIN — Medication 15 ML: at 08:22

## 2021-02-16 RX ADMIN — DOCUSATE SODIUM 100 MG: 100 CAPSULE ORAL at 08:23

## 2021-02-16 RX ADMIN — ALBUTEROL SULFATE 2 PUFF: 90 AEROSOL, METERED RESPIRATORY (INHALATION) at 08:21

## 2021-02-16 RX ADMIN — COLCHICINE 0.6 MG: 0.6 TABLET, FILM COATED ORAL at 08:23

## 2021-02-16 RX ADMIN — HYDROCHLOROTHIAZIDE 25 MG: 25 TABLET ORAL at 08:22

## 2021-02-16 NOTE — PROGRESS NOTES
"LTACH Fall Assessment Note    Santiago Spence is a 70 y.o.male  [Ht: 162.6 cm (64\"); Wt: 92.1 kg (203 lb)] admitted 2/16/2021  5:28 PM.    Current medications associated with an increased risk for fall include:  Amlodipine  Hydrochlorothiazide  Melatonin  Verapamil  Labetalol  Ondansetron    WAQAS Goodrich, [facility]  02/16/2117:45 CST         "

## 2021-02-16 NOTE — PLAN OF CARE
Goal Outcome Evaluation:     Progress: improving  Outcome Summary: Patient sat up in chair until about 2300. O2 at 7L high flow. C/o dryness in his nose and mild nose bleed. Ayr saline given. No c/o pain. BiPAP at night at 40% with sats in the mid 90s. Slept on his stomach/side all night. VSS. Safety maintained. Will continue to monitor.

## 2021-02-16 NOTE — PLAN OF CARE
Goal Outcome Evaluation:      Nutrition: Re-screen completed. Pt is currently on regular cardiac diet. Average PO intake 97% of 9 meals. Nutrition is appropriate at this time. RDN will re-screen in 7 days unless consulted.

## 2021-02-16 NOTE — PROGRESS NOTES
PULMONARY AND CRITICAL CARE PROGRESS NOTE - [facility]    Patient: Santiago Spence  1950   MR# [id number]   Acct# [id number]  02/16/21   09:30 CST  Referring Provider: Aime Rivera MD    Chief Complaint: Covid-19 pneumonia, acute respiratory failure    Interval history:   Discussed with HALEY Nath who has just come out of the patient's room.   He is down to 6 L HF and seems to be doing well.  His current O2 sat is 92%. He has been up moving in room and up to the chair. He utilized BiPAP last night and was able to prone.  He is coughing very little.  He is afebrile.    Meds:  albuterol sulfate HFA, 2 puff, Inhalation, 4x Daily - RT  amLODIPine, 5 mg, Oral, Q24H  ascorbic acid, 500 mg, Oral, BID  aspirin, 81 mg, Oral, Daily  atorvastatin, 10 mg, Oral, Nightly  cholecalciferol, 1,000 Units, Oral, Daily  colchicine, 0.6 mg, Oral, Daily  docusate sodium, 100 mg, Oral, Daily  enoxaparin, 40 mg, Subcutaneous, Q24H  famotidine, 20 mg, Oral, BID AC  hydroCHLOROthiazide, 25 mg, Oral, Daily  melatonin, 3 mg, Oral, Nightly  multivitamin and minerals, 15 mL, Oral, Daily  polyethylene glycol, 17 g, Oral, Daily  sodium chloride, 10 mL, Intravenous, Q12H  verapamil SR, 180 mg, Oral, Q24H  zinc sulfate, 220 mg, Oral, Daily         Review of Systems:   Review of Systems - General ROS: negative for - chills, fatigue or fever  Respiratory ROS: no cough, shortness of breath, or wheezing  Cardiovascular ROS: no chest pain or dyspnea on exertion    Physical Exam:  SpO2 Percentage    02/16/21 0259 02/16/21 0452 02/16/21 0817   SpO2: 97% 92% 93%     Temp:  [96 °F (35.6 °C)-97.8 °F (36.6 °C)] 96.7 °F (35.9 °C)  Heart Rate:  [60-89] 89  Resp:  [15-23] 19  BP: (100-154)/(55-80) 133/74    Intake/Output Summary (Last 24 hours) at 2/16/2021 0930  Last data filed at 2/16/2021 0800  Gross per 24 hour   Intake 480 ml   Output 900 ml   Net -420 ml     Vital signs reviewed: Assessment based on discussion with patient and  nursing   GENERAL/CONSTITUTIONAL: no distress.  High flow oxygen in place.  HEENT: atraumatic, normocephalic  NOSE: normal  NECK: jugular veins nondistended  CHEST: no paradox, no retractions.  No respiratory distress.   CARDIAC: regular rhythm  ABDOMEN: nondistended  : deferred  EXTREMITIES: no edema.  NEURO:  Alert and oriented x 3  SKIN: no jaundice.  No rash  Laboratory Data:  Results from last 7 days   Lab Units 02/16/21  0814 02/15/21  0526 02/14/21  0529   WBC 10*3/mm3 9.80 9.83 9.49   HEMOGLOBIN g/dL 15.1 14.7 14.6   PLATELETS 10*3/mm3 295 301 310     Results from last 7 days   Lab Units 02/16/21  0814 02/15/21  0526 02/14/21  0529 02/13/21  0641  02/10/21  0241   SODIUM mmol/L 137 136 138 133*   < > 137   POTASSIUM mmol/L 3.9 4.1 4.3 4.1   < > 4.7   BUN mg/dL 23 20 22 19   < > 22   CREATININE mg/dL 0.62* 0.64* 0.66* 0.55*   < > 0.71*   CRP mg/dL  --   --   --   --   --  0.24   FERRITIN ng/mL  --   --   --   --   --  543.50*   D DIMER QUANT mg/L (FEU)  --  0.56*  --  1.15*   < >  --     < > = values in this interval not displayed.         Blood Culture   Date Value Ref Range Status   02/02/2021 No growth at 2 days  Preliminary   02/02/2021 No growth at 2 days  Preliminary     Respiratory Culture   Date Value Ref Range Status   02/02/2021   Preliminary    Heavy growth (4+) Normal Respiratory Jazz: NO S.aureus/MRSA or Pseudomonas aeruginosa     Recent films:  Xr Chest 1 View    Result Date: 2/14/2021  1.. Bibasilar pneumonia. This shows some increased consolidation compared to the previous exam of 6 days earlier. This report was finalized on 02/14/2021 13:01 by Dr. Kang Wilkes MD.    Films reviewed personally by me.  My interpretation: None today 02/16/2021    Pulmonary Assessment:    1. SARS Covid-19 viral pneumonia -remdesivir completed  2. Acute respiratory failure with hypoxia related to COVID-19  3. Bilateral lung infiltrates  4. History of colon cancer  5. Obese  6. Obstructive sleep apnea, does  "not use his CPAP at home    Recommend:     · Continue high flow cannula 6L and wean down oxygen to keep sats above 88%  · Continue BiPAP for sleep  · Proning candidate: Yes  · Completed 10 days of Decadron.    · DVT prophylaxis-Lovenox 40 mg daily  · Stress ulcer prophylaxis-Pepcid twice daily  · Zinc, vitamin D, vitamin C, and melatonin  · Antibiotic-Merrem completed  · IS/OPEP, albuterol HFA  · Nutrition  · Mobilization     Electronically signed by: VALERIA Toney 2/16/2021 09:30 CST     ATTESTATION OF CLINICAL NOTE:  I have reviewed the notes, assessments, and/or procedures performed by VALERIA Velazquez, I concur with her/his documentation of Santiago Spence.       PULMONARY AND CRITICAL CARE PROGRESS NOTE - [facility]     Patient: Santiago Spence  1950   MR# [id number]   Acct# [id number]  02/15/21   11:25 CST  Referring Provider: Aime Rivera MD     Chief Complaint: Covid-19 pneumonia, acute respiratory failure     Interval history:   Discussed with nursing staff.  He is down to 6-7 L HF and seems to be doing well.  Working with incentive spirometry and aerobika. RN states they tried to wean to regular flow NC and desaturated. He states he is still on the \"green\" tubing. He states his appetite is good. Bowel movements and urination are good. He has been up moving in room and up to the chair. He feels he is breathing much better. He is coughing very little.      Meds:  albuterol sulfate HFA, 2 puff, Inhalation, 4x Daily - RT  amLODIPine, 5 mg, Oral, Q24H  ascorbic acid, 500 mg, Oral, BID  aspirin, 81 mg, Oral, Daily  atorvastatin, 10 mg, Oral, Nightly  cholecalciferol, 1,000 Units, Oral, Daily  colchicine, 0.6 mg, Oral, Daily  docusate sodium, 100 mg, Oral, Daily  enoxaparin, 40 mg, Subcutaneous, Q24H  famotidine, 20 mg, Oral, BID AC  hydroCHLOROthiazide, 25 mg, Oral, Daily  melatonin, 3 mg, Oral, Nightly  multivitamin and minerals, 15 mL, Oral, Daily  polyethylene " glycol, 17 g, Oral, Daily  sodium chloride, 10 mL, Intravenous, Q12H  verapamil SR, 180 mg, Oral, Q24H  zinc sulfate, 220 mg, Oral, Daily        Review of Systems:   Review of Systems - General ROS: negative for - chills, fatigue or fever  Respiratory ROS: no cough, shortness of breath, or wheezing  Cardiovascular ROS: no chest pain or dyspnea on exertion     Physical Exam:        SpO2 Percentage     02/15/21 0637 02/15/21 0700 02/15/21 0818   SpO2: 95% 90% 93%      Temp:  [96.1 °F (35.6 °C)-98.4 °F (36.9 °C)] 96.9 °F (36.1 °C)  Heart Rate:  [59-90] 87  Resp:  [12-21] 13  BP: (116-156)/(56-76) 148/76     Intake/Output Summary (Last 24 hours) at 2/15/2021 1125  Last data filed at 2/14/2021 2255      Gross per 24 hour   Intake 480 ml   Output 1450 ml   Net -970 ml      Vital signs reviewed: Assessment based on discussion with patient and nursing   GENERAL/CONSTITUTIONAL: no distress.   HEENT: atraumatic, normocephalic  NOSE: normal  NECK: jugular veins nondistended  CHEST: no paradox, no retractions.  No respiratory distress.   CARDIAC: regular rhythm  ABDOMEN: nondistended  : deferred  EXTREMITIES: no edema.  NEURO:  Alert and oriented x 3  SKIN: no jaundice.  No rash  Laboratory Data:         Results from last 7 days   Lab Units 02/15/21  0526 02/14/21  0529 02/13/21  0641   WBC 10*3/mm3 9.83 9.49 10.70   HEMOGLOBIN g/dL 14.7 14.6 15.2   PLATELETS 10*3/mm3 301 310 346               Results from last 7 days   Lab Units 02/15/21  0526 02/14/21  0529 02/13/21  0641   02/10/21  0241   SODIUM mmol/L 136 138 133*   < > 137   POTASSIUM mmol/L 4.1 4.3 4.1   < > 4.7   BUN mg/dL 20 22 19   < > 22   CREATININE mg/dL 0.64* 0.66* 0.55*   < > 0.71*   CRP mg/dL  --   --   --   --  0.24   FERRITIN ng/mL  --   --   --   --  543.50*   D DIMER QUANT mg/L (FEU) 0.56*  --  1.15*   < >  --     < > = values in this interval not displayed.                Blood Culture   Date Value Ref Range Status   02/02/2021 No growth at 2 days    Preliminary   02/02/2021 No growth at 2 days   Preliminary             Respiratory Culture   Date Value Ref Range Status   02/02/2021     Preliminary     Heavy growth (4+) Normal Respiratory Jazz: NO S.aureus/MRSA or Pseudomonas aeruginosa      Recent films:  Xr Chest 1 View     Result Date: 2/14/2021  1.. Bibasilar pneumonia. This shows some increased consolidation compared to the previous exam of 6 days earlier. This report was finalized on 02/14/2021 13:01 by Dr. Kang Wilkes MD.     Films reviewed personally by me.  My interpretation: None today 02/15/2021     Pulmonary Assessment:     12. SARS Covid-19 viral pneumonia -remdesivir completed  13. Acute respiratory failure with hypoxia related to COVID-19  14. Bilateral lung infiltrates  15. History of colon cancer  16. Obese  17. Obstructive sleep apnea, does not use his CPAP     Recommend:      · Continue high flow cannula 6L and wean down oxygen to keep sats above 88%  · Proning candidate: Yes, if able to  · Completed 10 days of Decadron.    · DVT prophylaxis-Lovenox 40 mg daily  · Stress ulcer prophylaxis-Pepcid twice daily  · Zinc, vitamin D, vitamin C, and melatonin  · Antibiotic-Merrem completed  · IS/OPEP, albuterol HFA  · Nutrition  · Mobilization      Electronically signed by: VALERIA Bernard 2/15/2021 11:25 CST      ATTESTATION OF CLINICAL NOTE:  I have reviewed the notes, assessments, and/or procedures performed by VALERIA Martinez, I concur with her/his documentation of Santiago Jordy.  Patient was seen in the follow-up visit in Covid isolation unit with advanced audiovisual technique with Zoom.  He was seen from outside the room to reduce the risk of cross infection exposure and to minimize use of PPE.      He is doing well overall and is currently requiring 6 L high flow oxygen but did not need any Vapotherm.  He needs to get his oxygen increased at time of exercise and activity.  He is walking in the room and working with  physical therapy. He is using BiPAP at night.  He was doing well overall.  His cough had improved and he had no other new complaints.     On physical examination per nursing assessment patient is elderly  gentleman appears comfortable.  HEENT atraumatic normocephalic.  Neck: Supple.  Heart: Sounds normal regular rhythm.  Lungs: Bibasilar crackles and diminished breath sound.  Abdomen: Soft nondistended.  Extremities: No edema normal pulses and color.  Neurologic: Grossly intact.  Skin: No breakdown.     He is doing well and making slow and steady progress. Continue current treatment plan and supportive respiratory care. Keep titrating oxygen down to maintain oxygen saturation more than 92%.  Continue albuterol HFA as needed and incentive spirometry and flutter valve.  He did not do very well on proning in the past but may be tried to improve his oxygenation if tolerated. Continue treatment for COVID-19 with adjunct treatment and he already completed remdesivir and Decadron and also finished antibiotics.  Physical activity as tolerated.  Nutritional support.  DVT and stress ulcer prophylaxis.  Plan for outpatient follow-up in pulmonary clinic after discharge for sleep apnea.  Plan for home oxygen evaluation prior to discharge.  Repeat labs and imaging studies from time to time.  Pulmonary team will continue following and make further recommendations.    I have seen and examined patient personally, performing a face-to-face diagnostic evaluation with plan of care reviewed and developed with APRN and nursing staff. I have addended and/or modified the above history of present illness, physical examination, and assessment and plan to reflect my findings and impressions. Essential elements of the care plan were discussed with APRN above.  Agree with findings and assessment/plan as documented above.    Cora Ewing MD  Pulmonologist/Intensivist  2/16/2021 10:39 CST

## 2021-02-16 NOTE — THERAPY TREATMENT NOTE
Acute Care - Physical Therapy Treatment Note  [facility]     Patient Name: Santiago Spence  : 1950  MRN: [id number]  Today's Date: 2021           PT Assessment (last 12 hours)      PT Evaluation and Treatment     Row Name 21 1100          Physical Therapy Time and Intention    Subjective Information  complains of;weakness;dyspnea  -     Document Type  therapy note (daily note)  -     Mode of Treatment  physical therapy  -     Comment  answered pt's questions concerning progression of care and LTACH  -     Row Name 21 1100          General Information    Existing Precautions/Restrictions  fall;oxygen therapy device and L/min  -     Row Name 21 1100          Pain Scale: Word Pre/Post-Treatment    Pain: Word Scale, Pretreatment  0 - no pain  -     Posttreatment Pain Rating  0 - no pain  -[facility] Name 21 1100          Bed Mobility    Comment (Bed Mobility)  in chair  -     Row Name 21 1100          Transfers    Sit-Stand [place] (Transfers)  supervision  -     Stand-Sit [place] (Transfers)  supervision  -     Row Name 21 1100          Gait/Stairs (Locomotion)    [place] Level (Gait)  contact guard  -     Assistive Device (Gait)  -- HHA at times  -     Distance in Feet (Gait)  45x 2 with one sitting rest  -     Deviations/Abnormal Patterns (Gait)  stride length decreased;nciolle decreased  -     Row Name 21 1100          Plan of Care Review    Plan of Care Reviewed With  patient  -     Progress  improving  -     Outcome Summary  Pt. is very motivated to improve. Pt. has been performing exercises on his own. He was Supervision for transfers. He walked 45' x 2 with one sitting rest. His O2 sats continue to drop to 85-86% while on 6L , but quickly recovers to 90-92%. Answered pt's questions concerning progression of care.   -     Row Name 21 1100          Vital Signs    Pre SpO2 (%)  93  -MF     O2 Delivery Pre  Treatment  supplemental O2 6l  -MF     Intra SpO2 (%)  86  -MF     O2 Delivery Intra Treatment  supplemental O2 6l  -MF     Post SpO2 (%)  92  -MF     O2 Delivery Post Treatment  supplemental O2 6l  -MF     Pre Patient Position  Sitting  -MF     Intra Patient Position  Standing  -MF     Post Patient Position  Sitting  -MF     Row Name 02/16/21 1100          Positioning and Restraints    Pre-Treatment Position  sitting in chair/recliner  -MF     Post Treatment Position  chair  -MF     In Chair  notified nsg;sitting;call light within reach;encouraged to call for assist  -MF       User Key  (r) = Recorded By, (t) = Taken By, (c) = Cosigned By    Initials Name Provider Type    Dayana Mcginnis, PTA Physical Therapy Assistant        Physical Therapy Education                 Title: PT OT SLP Therapies (In Progress)     Topic: Physical Therapy (In Progress)     Point: Mobility training (Done)     Learning Progress Summary           Patient Acceptance, E, MIGUEL,DU by MARY at 2/12/2021 1100    Comment: Progression of PT POC and benefits of activity                   Point: Home exercise program (Not Started)     Learner Progress:  Not documented in this visit.          Point: Body mechanics (Not Started)     Learner Progress:  Not documented in this visit.          Point: Precautions (Not Started)     Learner Progress:  Not documented in this visit.                      User Key     Initials Effective Dates Name Provider Type Discipline    MARY 08/02/16 -  Henry Alvarez PT DPT Physical Therapist PT              PT Recommendation and Plan     Plan of Care Reviewed With: patient  Progress: improving  Outcome Summary: Pt. is very motivated to improve. Pt. has been performing exercises on his own. He was Supervision for transfers. He walked 45' x 2 with one sitting rest. His O2 sats continue to drop to 85-86% while on 6L , but quickly recovers to 90-92%. Answered pt's questions concerning progression of care.        Time  Calculation:   PT Charges     Row Name 02/16/21 1331             Time Calculation    Start Time  1100  -MF      Stop Time  1130  -MF      Time Calculation (min)  30 min  -MF      PT Received On  02/16/21  -MF      PT Goal Re-Cert Due Date  02/22/21  -MF         Time Calculation- PT    Total Timed Code Minutes- PT  30 minute(s)  -MF         Timed Charges    10087 - Gait Training Minutes   30  -MF        User Key  (r) = Recorded By, (t) = Taken By, (c) = Cosigned By    Initials Name Provider Type    Dayana Mcginnis PTA Physical Therapy Assistant        Therapy Charges for Today     Code Description Service Date Service Provider Modifiers Qty    69745939044 HC PT THER PROC EA 15 MIN 2/15/2021 Dayana Malhotra, PTA GP 1    96905667185 HC GAIT TRAINING EA 15 MIN 2/15/2021 Dayana Malhotra, FLORA GP 1    56419448589 HC GAIT TRAINING EA 15 MIN 2/16/2021 Dayana Malhotra, PTA GP 2          PT G-Codes  Outcome Measure Options: AM-PAC 6 Clicks Basic Mobility (PT)  AM-PAC 6 Clicks Score (PT): 21    Dayana Malhotra PTA  2/16/2021

## 2021-02-16 NOTE — PROGRESS NOTES
Continued Stay Note  [facility]     Patient Name: Santiago Spence  MRN: [id number]  Today's Date: 2/16/2021    Admit Date: 2/2/2021    Discharge Plan     Row Name 02/16/21 0929       Plan    Plan  Referral to [facility]    Patient/Family in Agreement with Plan  yes    Plan Comments  Pt has order for LTAC. Spoke with pt which is agreeable and willing to speak with Jyoti from unit about this option. Informed Jyoti of referral. Will follow for decision.        Discharge Codes    No documentation.             SOFI Patel

## 2021-02-16 NOTE — PROGRESS NOTES
Continued Stay Note  [facility]     Patient Name: Santiago Spence  MRN: [id number]  Today's Date: 2/16/2021    Admit Date: 2/2/2021    Discharge Plan     Row Name 02/16/21 1250       Plan    Plan  [facility]    Patient/Family in Agreement with Plan  yes    Final Discharge Disposition Code  63 - [facility]    Final Note  Pt has been offered a bed on LT[facility] and pt accepted. Pt will be moving there today.        Discharge Codes    No documentation.             SOFI Patel

## 2021-02-16 NOTE — DISCHARGE SUMMARY
[facility]  DISCHARGE SUMMARY       Date of Admission: 2/2/2021  Date of Discharge:  2/16/2021  Primary Care Physician: Meredith Lloyd APRN    Presenting Problem/History of Present Illness:  Pneumonia due to COVID-19 virus [U07.1, J12.82]     Final Discharge Diagnoses:  Active Hospital Problems    Diagnosis   • Cytokine release syndrome, grade 1   • Cytokine release syndrome, grade 2   • Cytokine release syndrome, grade 3   • Cytokine release syndrome, grade 4   • Obesity (BMI 30-39.9)   • Pneumonia due to COVID-19 virus   • Acute respiratory failure with hypoxia (CMS/[facility])   • Essential hypertension       Consults: Infectious disease.  Pulmonary.    Pertinent Test Results:      IMPRESSION: Chest x-ray.  1.. Bibasilar pneumonia. This shows some increased consolidation  compared to the previous exam of 6 days earlier.    Interpretation Summary echocardiogram    · The left ventricular cavity is moderately dilated.  · Left ventricular wall thickness is consistent with mild to moderate concentric hypertrophy.  · Mild aortic valve stenosis is present.  · Left ventricular diastolic function is consistent with (grade I) impaired relaxation.  · Left ventricular ejection fraction appears to be 56 - 60%. Left ventricular systolic function is normal.     Chief Complaint on Day of Discharge: none    History of Present Illness on Day of Discharge:   Patient is 70-year-old white male past medical history of hypertension arthritis and colon cancer.  He presents with an 8-day history of low-grade fevers cough congestion watery eyes shortness of breath.  He had been treated by his primary care doctor with a believe a Z-Rashid and/or doxycycline or both as well as started on Decadron p.o.  He is also been taking zinc and vitamin C.  He presented to [facility] due to worsening shortness of breath.  His sat was 81% on room air.  He had chest x-rays and  CT scans of the chest that showed bilateral interstitial infiltrates consistent with Covid.  D-dimers were elevated but no PEs were noted.  His Covid test is positive.  He was also tested by his physician earlier in the day but the test was not back yet.  Patient denies nausea vomiting or diarrhea he does state he has sense of taste and smell of not changed either.  He has been coughing up brown sputum.  Rest of his laboratory showed a pH of 7.48 PCO2 39 PO2 of 41 bicarb 29 sat 81% on room air.  LDH was elevated at 454 ferritin was elevated 428.  White counts 9.2 hemoglobin 16.1 hematocrit 46 platelets 188,000 with 92 segs 4.2% lymphocytes 2.4% monocytes.  D-dimer is 1.06 (CTA negative for PE) his troponin was negative his AST was 40.  His albumin was low at 2.7 his glucose was 122 BUN slightly elevated at 19 with a creatinine of 0.85 respectively.  Due to the fact they had no ICU beds at the other facility we have been asked accept him in transfer.  Patient is now on 15 L satting about 92%.  He seems to be resting comfortably currently.     Hospital Course:  The patient is a 70 y.o. male who presented to [facility] with Covid-19 pneumonia/chronic respiratory failure with hypoxia.      COVID-19 pneumonia/respiratory failure with hypoxia.  Previously was on antibiotics and Decadron outpatient.  Zinc.  Vitamin C.  Vitamin D.  Melatonin at night.  Patient finished a course of Decadron.  Finished a course of remdesivir.  S/P Convalescent plasma x1.  Tessalon Perle.  Delsym.  Consult pulmonary.  Consult infectious disease.  Patient to cont colchicine.    CT scan and x-ray at [facility] -bilateral infiltrate consistent with Covid.  D-dimer was elevated, no PE per CTA.  Chest x-ray-. Bibasilar pneumonia, some increased consolidation compared to the previous exam of 6 days earlier.  Patient is on 5 L of oxygen.     Hypertension/diastolic heart failure.  Patient to continue Norvasc,  "hydrochlorthiazide, and Lipitor.  Labetalol as needed.    Echocardiogram-ejection fraction 56 to 60%, diastolic dysfunction grade 1, left ventricle cavity moderately dilated, moderate concentric hypertrophy, mild aortic valve stenosis.     Reflux.  Pepcid.  Zofran as needed.     Obesity.  BMI is 38.  Diet exercise has been discussed with patient.     History colon cancer.     Lovenox prophylaxis.     Nutrition.  Regular/cardiac diet.     Blood cultures-no growth 5 days.  Legionella antigen-negative.  Respiratory culture- normal resp cb.  Strep pneumo-negative.  Mycoplasma-pending.     Vital signs stable, afebrile.  Plan to discharge patient to LTAC today.  Follow with LTAC physician as soon as able.    Condition on Discharge: Stable.    Physical Exam on Discharge:  /63 (BP Location: Right arm, Patient Position: Sitting)   Pulse 88   Temp 97.2 °F (36.2 °C) (Oral)   Resp 20   Ht 162.6 cm (64.02\")   Wt 92.1 kg (203 lb 0.7 oz)   SpO2 93%   BMI 34.83 kg/m²   Physical Exam  Vitals signs and nursing note reviewed.   Constitutional:       Appearance: He is well-developed.   HENT:      Head: Normocephalic and atraumatic.   Eyes:      Conjunctiva/sclera: Conjunctivae normal.      Pupils: Pupils are equal, round, and reactive to light.   Neck:      Musculoskeletal: Neck supple.      Vascular: No JVD.   Cardiovascular:      Rate and Rhythm: Normal rate and regular rhythm.      Heart sounds: Normal heart sounds. No murmur. No friction rub. No gallop.    Pulmonary:      Effort: No respiratory distress.      Breath sounds: No wheezing or rales.      Comments: Diminished breath sound.  Clear.  Patient currently on 5 L of oxygen.  Chest:      Chest wall: No tenderness.   Abdominal:      General: Bowel sounds are normal. There is no distension.      Palpations: Abdomen is soft.      Tenderness: There is no abdominal tenderness. There is no guarding or rebound.      Comments: Obesity.   Musculoskeletal: Normal range of " motion.         General: No tenderness or deformity.   Skin:     General: Skin is warm and dry.      Findings: No rash.   Neurological:      Mental Status: He is alert and oriented to person, place, and time.      Cranial Nerves: No cranial nerve deficit.      Motor: No abnormal muscle tone.      Deep Tendon Reflexes: Reflexes normal.   Psychiatric:         Behavior: Behavior normal.         Thought Content: Thought content normal.         Judgment: Judgment normal.     Discharge Disposition:  Short Term Hospital (DC - External)    Discharge Medications:     Discharge Medications      New Medications      Instructions Start Date   ascorbic acid 500 MG tablet  Commonly known as: VITAMIN C   500 mg, Oral, 2 Times Daily      atorvastatin 10 MG tablet  Commonly known as: LIPITOR   10 mg, Oral, Nightly      benzonatate 200 MG capsule  Commonly known as: TESSALON   200 mg, Oral, Every 4 Hours PRN      cholecalciferol 25 MCG (1000 UT) tablet  Commonly known as: VITAMIN D3   1,000 Units, Oral, Daily   Start Date: February 17, 2021     colchicine 0.6 MG tablet   0.6 mg, Oral, Daily   Start Date: February 17, 2021     dextromethorphan polistirex ER 30 MG/5ML Suspension Extended Release oral suspension  Commonly known as: DELSYM   60 mg, Oral, Every 12 Hours PRN      docusate sodium 100 MG capsule   100 mg, Oral, Daily   Start Date: February 17, 2021     enoxaparin 40 MG/0.4ML solution syringe  Commonly known as: LOVENOX   40 mg, Subcutaneous, Every 24 Hours   Start Date: February 17, 2021     famotidine 20 MG tablet  Commonly known as: PEPCID   20 mg, Oral, 2 Times Daily Before Meals      melatonin 3 MG tablet   3 mg, Oral, Nightly      polyethylene glycol 17 g packet  Commonly known as: MIRALAX   17 g, Oral, Daily   Start Date: February 17, 2021     sodium chloride 0.65 % nasal spray   1 spray, Nasal, As Needed      zinc sulfate 220 (50 Zn) MG capsule  Commonly known as: ZINCATE   220 mg, Oral, Daily   Start Date: February  17, 2021        Changes to Medications      Instructions Start Date   verapamil  MG CR tablet  Commonly known as: CALAN-SR  What changed:   · medication strength  · how much to take  · when to take this   180 mg, Oral, Every 24 Hours Scheduled   Start Date: February 17, 2021        Continue These Medications      Instructions Start Date   albuterol sulfate  (90 Base) MCG/ACT inhaler  Commonly known as: PROVENTIL HFA;VENTOLIN HFA;PROAIR HFA   2 puffs, Inhalation, Every 4 Hours PRN      Aspirin Adult Low Strength 81 MG chewable tablet  Generic drug: aspirin   81 mg, Oral, Daily      hydroCHLOROthiazide 25 MG tablet  Commonly known as: HYDRODIURIL   25 mg, Oral, Daily      multivitamin tablet tablet   1 tablet, Oral, Daily         Stop These Medications    Potassium 75 MG tablet            Discharge Diet:   Diet Instructions     Advance Diet As Tolerated            Activity at Discharge:   Activity Instructions     Activity as Tolerated            Discharge Care Plan/Instructions: Discharge to LTAC with family.    Follow-up Appointments:   Follow with LTAC physician as soon as able.    Electronically signed by Aime Rivera MD, 02/16/21, 14:25 CST.    Time: Greater than 30 minutes.

## 2021-02-16 NOTE — PLAN OF CARE
Goal Outcome Evaluation:  Plan of Care Reviewed With: patient     Outcome Summary: Pt sat up in chair all day.  Denies any c/o pain.  Lung sounds diminished, np cough.  High flow oxygen at 7l/nc sats wnl.  He does desat with exertion, but recovers quickly.  Safety maintained, continue to monitor.

## 2021-02-16 NOTE — PLAN OF CARE
Goal Outcome Evaluation:  Plan of Care Reviewed With: patient  Progress: improving  Outcome Summary: Pt. is very motivated to improve. Pt. has been performing exercises on his own. He was Supervision for transfers. He walked 45' x 2 with one sitting rest. His O2 sats continue to drop to 85-86% while on 6L , but quickly recovers to 90-92%. Answered pt's questions concerning progression of care.

## 2021-02-17 LAB
ALBUMIN SERPL-MCNC: 2.8 G/DL (ref 3.5–5.2)
ALBUMIN/GLOB SERPL: 1.1 G/DL
ALP SERPL-CCNC: 61 U/L (ref 39–117)
ALT SERPL W P-5'-P-CCNC: 29 U/L (ref 1–41)
ANION GAP SERPL CALCULATED.3IONS-SCNC: 6 MMOL/L (ref 5–15)
AST SERPL-CCNC: 17 U/L (ref 1–40)
BASOPHILS # BLD AUTO: 0.01 10*3/MM3 (ref 0–0.2)
BASOPHILS NFR BLD AUTO: 0.1 % (ref 0–1.5)
BILIRUB SERPL-MCNC: 0.6 MG/DL (ref 0–1.2)
BUN SERPL-MCNC: 20 MG/DL (ref 8–23)
BUN/CREAT SERPL: 27.8 (ref 7–25)
CALCIUM SPEC-SCNC: 8.2 MG/DL (ref 8.6–10.5)
CHLORIDE SERPL-SCNC: 100 MMOL/L (ref 98–107)
CO2 SERPL-SCNC: 32 MMOL/L (ref 22–29)
CREAT SERPL-MCNC: 0.72 MG/DL (ref 0.76–1.27)
DEPRECATED RDW RBC AUTO: 43.8 FL (ref 37–54)
EOSINOPHIL # BLD AUTO: 0.1 10*3/MM3 (ref 0–0.4)
EOSINOPHIL NFR BLD AUTO: 1.4 % (ref 0.3–6.2)
ERYTHROCYTE [DISTWIDTH] IN BLOOD BY AUTOMATED COUNT: 13.3 % (ref 12.3–15.4)
GFR SERPL CREATININE-BSD FRML MDRD: 107 ML/MIN/1.73
GLOBULIN UR ELPH-MCNC: 2.5 GM/DL
GLUCOSE SERPL-MCNC: 87 MG/DL (ref 65–99)
HCT VFR BLD AUTO: 40.5 % (ref 37.5–51)
HGB BLD-MCNC: 14.2 G/DL (ref 13–17.7)
IMM GRANULOCYTES # BLD AUTO: 0.06 10*3/MM3 (ref 0–0.05)
IMM GRANULOCYTES NFR BLD AUTO: 0.8 % (ref 0–0.5)
LYMPHOCYTES # BLD AUTO: 1.07 10*3/MM3 (ref 0.7–3.1)
LYMPHOCYTES NFR BLD AUTO: 15.2 % (ref 19.6–45.3)
MCH RBC QN AUTO: 31.6 PG (ref 26.6–33)
MCHC RBC AUTO-ENTMCNC: 35.1 G/DL (ref 31.5–35.7)
MCV RBC AUTO: 90.2 FL (ref 79–97)
MONOCYTES # BLD AUTO: 0.73 10*3/MM3 (ref 0.1–0.9)
MONOCYTES NFR BLD AUTO: 10.3 % (ref 5–12)
NEUTROPHILS NFR BLD AUTO: 5.09 10*3/MM3 (ref 1.7–7)
NEUTROPHILS NFR BLD AUTO: 72.2 % (ref 42.7–76)
NRBC BLD AUTO-RTO: 0 /100 WBC (ref 0–0.2)
PLATELET # BLD AUTO: 242 10*3/MM3 (ref 140–450)
PMV BLD AUTO: 9.7 FL (ref 6–12)
POTASSIUM SERPL-SCNC: 3.5 MMOL/L (ref 3.5–5.2)
PREALB SERPL-MCNC: 31.1 MG/DL (ref 20–40)
PROT SERPL-MCNC: 5.3 G/DL (ref 6–8.5)
RBC # BLD AUTO: 4.49 10*6/MM3 (ref 4.14–5.8)
SODIUM SERPL-SCNC: 138 MMOL/L (ref 136–145)
WBC # BLD AUTO: 7.06 10*3/MM3 (ref 3.4–10.8)

## 2021-02-17 PROCEDURE — 25010000002 ENOXAPARIN PER 10 MG: Performed by: INTERNAL MEDICINE

## 2021-02-17 PROCEDURE — 80053 COMPREHEN METABOLIC PANEL: CPT | Performed by: INTERNAL MEDICINE

## 2021-02-17 PROCEDURE — 97165 OT EVAL LOW COMPLEX 30 MIN: CPT | Performed by: OCCUPATIONAL THERAPIST

## 2021-02-17 PROCEDURE — 85025 COMPLETE CBC W/AUTO DIFF WBC: CPT | Performed by: INTERNAL MEDICINE

## 2021-02-17 PROCEDURE — 97161 PT EVAL LOW COMPLEX 20 MIN: CPT | Performed by: PHYSICAL THERAPIST

## 2021-02-17 PROCEDURE — 84134 ASSAY OF PREALBUMIN: CPT | Performed by: INTERNAL MEDICINE

## 2021-02-17 PROCEDURE — 99221 1ST HOSP IP/OBS SF/LOW 40: CPT | Performed by: INTERNAL MEDICINE

## 2021-02-17 NOTE — PROGRESS NOTES
PULMONARY AND CRITICAL CARE PROGRESS NOTE - [facility]    Patient: Santiago Spence  1950   MR# [id number]   Acct# [id number]  02/17/21   12:40 CST  Referring Provider: Kojo Hanson MD    Chief Complaint: Covid-19 pneumonia, acute respiratory failure    Interval history:   Patient was seen in the follow-up visit in pulmonary rounds in LTAC unit today.  He is moved out of the Covid unit to LTAC unit.  He is down to 4 L HF and seems to be doing well.  He is working with the physical therapy.  His current O2 sat is 92%. He has been up moving in room and up to the chair. He utilized BiPAP last night and trying to do some prone ventilation..  He has minimal coughing and overall feeling better.  He is afebrile.    Meds:  albuterol sulfate HFA, 2 puff, Inhalation, 4x Daily - RT  amLODIPine, 5 mg, Oral, Q24H  ascorbic acid, 500 mg, Oral, BID  aspirin, 81 mg, Oral, Daily  atorvastatin, 10 mg, Oral, Nightly  cholecalciferol, 1,000 Units, Oral, Daily  colchicine, 0.6 mg, Oral, Daily  docusate sodium, 100 mg, Oral, Daily  enoxaparin, 40 mg, Subcutaneous, Q12H  famotidine, 20 mg, Oral, BID AC  hydroCHLOROthiazide, 25 mg, Oral, Daily  melatonin, 3 mg, Oral, Nightly  multivitamin and minerals, 15 mL, Oral, Daily  polyethylene glycol, 17 g, Oral, Daily  sodium chloride, 10 mL, Intravenous, Q12H  verapamil SR, 180 mg, Oral, Q24H  zinc sulfate, 220 mg, Oral, Daily         Review of Systems:   Review of Systems - General ROS: negative for - chills, fatigue or fever  Respiratory ROS: no cough, shortness of breath, or wheezing  Cardiovascular ROS: no chest pain or dyspnea on exertion    Physical Exam:  There were no vitals filed for this visit.  Temp:  [96.8 °F (36 °C)] 96.8 °F (36 °C)  Heart Rate:  [95] 95  Resp:  [20] 20  BP: (136)/(75) 136/75  No intake or output data in the 24 hours ending 02/17/21 1240  Vital signs reviewed: Assessment based on discussion with patient and nursing      GENERAL/CONSTITUTIONAL: no distress.  High flow oxygen in place.  HEENT: atraumatic, normocephalic  NOSE: normal  NECK: jugular veins nondistended  CHEST: no paradox, no retractions.  No respiratory distress.   CARDIAC: regular rhythm  ABDOMEN: nondistended  : deferred  EXTREMITIES: no edema.  NEURO:  Alert and oriented x 3  SKIN: no jaundice.  No rash  Laboratory Data:  Results from last 7 days   Lab Units 02/17/21  0412 02/16/21  0814 02/15/21  0526   WBC 10*3/mm3 7.06 9.80 9.83   HEMOGLOBIN g/dL 14.2 15.1 14.7   PLATELETS 10*3/mm3 242 295 301     Results from last 7 days   Lab Units 02/17/21  0412 02/16/21  0814 02/15/21  0526  02/13/21  0641   SODIUM mmol/L 138 137 136   < > 133*   POTASSIUM mmol/L 3.5 3.9 4.1   < > 4.1   BUN mg/dL 20 23 20   < > 19   CREATININE mg/dL 0.72* 0.62* 0.64*   < > 0.55*   D DIMER QUANT mg/L (FEU)  --   --  0.56*  --  1.15*    < > = values in this interval not displayed.         Blood Culture   Date Value Ref Range Status   02/02/2021 No growth at 2 days  Preliminary   02/02/2021 No growth at 2 days  Preliminary     Respiratory Culture   Date Value Ref Range Status   02/02/2021   Preliminary    Heavy growth (4+) Normal Respiratory Jazz: NO S.aureus/MRSA or Pseudomonas aeruginosa     Recent films:  No radiology results for the last day  Films reviewed personally by me.  My interpretation: None today 02/16/2021    Pulmonary Assessment:    1. SARS Covid-19 viral pneumonia -remdesivir completed  2. Acute respiratory failure with hypoxia related to COVID-19  3. Bilateral lung infiltrates  4. History of colon cancer  5. Obese  6. Obstructive sleep apnea, does not use his CPAP at home    Recommend:     · Continue high flow cannula 6L and wean down oxygen to keep sats above 88%  · Continue BiPAP for sleep  · Proning candidate: Yes  · Completed 10 days of Decadron.    · DVT prophylaxis-Lovenox 40 mg daily  · Stress ulcer prophylaxis-Pepcid twice daily  · Zinc, vitamin D, vitamin C, and  "melatonin  · Antibiotic-Merrem completed  · IS/OPEP, albuterol HFA  · Nutrition  · Mobilization     Electronically signed by: Cora Ewing MD 2/17/2021 12:40 CST     ATTESTATION OF CLINICAL NOTE:  I have reviewed the notes, assessments, and/or procedures performed by VALERIA Velazquez, I concur with her/his documentation of Santiago Spence.       PULMONARY AND CRITICAL CARE PROGRESS NOTE - [facility]     Patient: Santiago Spence  1950   MR# [id number]   Acct# [id number]  02/15/21   11:25 CST  Referring Provider: Aime Rivera MD     Chief Complaint: Covid-19 pneumonia, acute respiratory failure     Interval history:   Discussed with nursing staff.  He is down to 6-7 L HF and seems to be doing well.  Working with incentive spirometry and aerobika. RN states they tried to wean to regular flow NC and desaturated. He states he is still on the \"green\" tubing. He states his appetite is good. Bowel movements and urination are good. He has been up moving in room and up to the chair. He feels he is breathing much better. He is coughing very little.      Meds:  albuterol sulfate HFA, 2 puff, Inhalation, 4x Daily - RT  amLODIPine, 5 mg, Oral, Q24H  ascorbic acid, 500 mg, Oral, BID  aspirin, 81 mg, Oral, Daily  atorvastatin, 10 mg, Oral, Nightly  cholecalciferol, 1,000 Units, Oral, Daily  colchicine, 0.6 mg, Oral, Daily  docusate sodium, 100 mg, Oral, Daily  enoxaparin, 40 mg, Subcutaneous, Q24H  famotidine, 20 mg, Oral, BID AC  hydroCHLOROthiazide, 25 mg, Oral, Daily  melatonin, 3 mg, Oral, Nightly  multivitamin and minerals, 15 mL, Oral, Daily  polyethylene glycol, 17 g, Oral, Daily  sodium chloride, 10 mL, Intravenous, Q12H  verapamil SR, 180 mg, Oral, Q24H  zinc sulfate, 220 mg, Oral, Daily        Review of Systems:   Review of Systems - General ROS: negative for - chills, fatigue or fever  Respiratory ROS: no cough, shortness of breath, or wheezing  Cardiovascular ROS: no chest pain or " dyspnea on exertion     Physical Exam:        SpO2 Percentage     02/15/21 0637 02/15/21 0700 02/15/21 0818   SpO2: 95% 90% 93%      Temp:  [96.1 °F (35.6 °C)-98.4 °F (36.9 °C)] 96.9 °F (36.1 °C)  Heart Rate:  [59-90] 87  Resp:  [12-21] 13  BP: (116-156)/(56-76) 148/76     Intake/Output Summary (Last 24 hours) at 2/15/2021 1125  Last data filed at 2/14/2021 2255      Gross per 24 hour   Intake 480 ml   Output 1450 ml   Net -970 ml      Vital signs reviewed: Assessment based on discussion with patient and nursing   GENERAL/CONSTITUTIONAL: no distress.   HEENT: atraumatic, normocephalic  NOSE: normal  NECK: jugular veins nondistended  CHEST: no paradox, no retractions.  No respiratory distress.   CARDIAC: regular rhythm  ABDOMEN: nondistended  : deferred  EXTREMITIES: no edema.  NEURO:  Alert and oriented x 3  SKIN: no jaundice.  No rash  Laboratory Data:         Results from last 7 days   Lab Units 02/15/21  0526 02/14/21  0529 02/13/21  0641   WBC 10*3/mm3 9.83 9.49 10.70   HEMOGLOBIN g/dL 14.7 14.6 15.2   PLATELETS 10*3/mm3 301 310 346               Results from last 7 days   Lab Units 02/15/21  0526 02/14/21  0529 02/13/21  0641   02/10/21  0241   SODIUM mmol/L 136 138 133*   < > 137   POTASSIUM mmol/L 4.1 4.3 4.1   < > 4.7   BUN mg/dL 20 22 19   < > 22   CREATININE mg/dL 0.64* 0.66* 0.55*   < > 0.71*   CRP mg/dL  --   --   --   --  0.24   FERRITIN ng/mL  --   --   --   --  543.50*   D DIMER QUANT mg/L (FEU) 0.56*  --  1.15*   < >  --     < > = values in this interval not displayed.                Blood Culture   Date Value Ref Range Status   02/02/2021 No growth at 2 days   Preliminary   02/02/2021 No growth at 2 days   Preliminary             Respiratory Culture   Date Value Ref Range Status   02/02/2021     Preliminary     Heavy growth (4+) Normal Respiratory Jazz: NO S.aureus/MRSA or Pseudomonas aeruginosa      Recent films:  Xr Chest 1 View     Result Date: 2/14/2021  1.. Bibasilar pneumonia. This shows  some increased consolidation compared to the previous exam of 6 days earlier. This report was finalized on 02/14/2021 13:01 by Dr. Kang Wilkes MD.     Films reviewed personally by me.  My interpretation: None today 02/15/2021     Pulmonary Assessment:     12. SARS Covid-19 viral pneumonia -remdesivir completed  13. Acute respiratory failure with hypoxia related to COVID-19  14. Bilateral lung infiltrates  15. History of colon cancer  16. Obese  17. Obstructive sleep apnea, does not use his CPAP     Recommend:      · He is overall doing well and we will continue the current respiratory care and bronchodilator treatment.    · Continue high flow cannula 3-4L and wean down oxygen to keep sats above 92%  · Proning candidate: Yes,.  He is doing better on proning but came to the complete proning all the time.  · Completed 10 days of Decadron.    · DVT prophylaxis-Lovenox 40 mg daily  · Stress ulcer prophylaxis-Pepcid twice daily  · Zinc, vitamin D, vitamin C, and melatonin adjunct treatment for COVID-19.    · Antibiotic-Merrem completed  · IS/OPEP, albuterol HFA  · Nutritional support.  · Mobilization and physical therapy as tolerated.  · Repeat labs and imaging studies from time to time and continue contact and respiratory isolation.\  · He will need outpatient pulmonary clinic follow-up for sleep apnea and will also need home oxygen evaluation prior to the discharge.  · Pulmonary team will continue following him and make further recommendations       Cora Ewing MD  Pulmonologist/Intensivist  2/17/2021 12:40 CST

## 2021-02-17 NOTE — CONSULTS
PULMONARY AND CRITICAL CARE CONSULT - [facility]    Santiago Spence   MR# [id number]  Acct# [id number]  2/17/2021   14:13 CST    Referring Provider: Kojo Hanson MD    Chief Complaint: Acute respiratory failure related to Covid pneumonia    HPI: We are consulted by Kojo Hanson MD to see this 70 y.o. male born on 1950.  Patient was followed inpatient by Dr. Macario Rodriguez for acute respiratory failure related to COVID-19 pneumonia.  Patient presented to [facility] with continuous worsening and severe shortness of breath in the entirety of the chest for 14 days.  He had respiratory deterioration requiring escalation of his oxygen therapy.  Patient is seen from the window and the door as well as video technology in an effort to reduce exposure and preserve PPE.  Patient was initially treated outpatient by his primary care physician with possibly a Z-Rashid and doxycycline as well as started on p.o. Decadron.  Patient has been taking zinc and vitamin C.  When his symptoms did not improve he presented to the hospital at [facility].  His O2 sat was noted to be 81% on room air at arrival.  His D-dimer was elevated but no PE was noted.  His Covid test was positive.  Imaging showed bilateral interstitial infiltrates consistent with Covid.  He was initially treated with Vapotherm 30 L and 100% FiO2.  He is currently on 4 L high flow during the day and using the BiPAP at night.  Per review of records he has been proning.  Nursing reports no events overnight.  Patient is sitting up in the chair smiling and waving and giving me a thumbs up.  He has never smoked.  He has known history of sleep apnea but never had a CPAP.  He also has known history of colon cancer which was treated.  He denies any prior lung problems, was not on any inhalers, and not on any oxygen at home.  He completed a course of remdesivir, dexamethasone, and had 1 unit of convalescent plasma.  His echo showed  an EF of 50 to 60%, grade 1 diastolic dysfunction, left ventricular cavity is moderately dilated, moderate concentric hypertrophy, mild aortic valve stenosis.    Past Medical History   has a past medical history of Arthritis, Cancer (CMS/HCC), Colon cancer (CMS/HCC), Hypertension, and Skin cancer.   has a past surgical history that includes Colon surgery; Joint replacement; and Skin cancer excision.  No Known Allergies  Medications  albuterol sulfate HFA, 2 puff, Inhalation, 4x Daily - RT  amLODIPine, 5 mg, Oral, Q24H  ascorbic acid, 500 mg, Oral, BID  aspirin, 81 mg, Oral, Daily  atorvastatin, 10 mg, Oral, Nightly  cholecalciferol, 1,000 Units, Oral, Daily  colchicine, 0.6 mg, Oral, Daily  docusate sodium, 100 mg, Oral, Daily  enoxaparin, 40 mg, Subcutaneous, Q12H  famotidine, 20 mg, Oral, BID AC  hydroCHLOROthiazide, 25 mg, Oral, Daily  melatonin, 3 mg, Oral, Nightly  multivitamin and minerals, 15 mL, Oral, Daily  polyethylene glycol, 17 g, Oral, Daily  sodium chloride, 10 mL, Intravenous, Q12H  verapamil SR, 180 mg, Oral, Q24H  zinc sulfate, 220 mg, Oral, Daily         Social History   reports that he has never smoked. He quit smokeless tobacco use about 4 years ago.  Family History  family history includes Coronary artery disease in his father; Hypertension in his mother.  Review of Systems:  Review of Systems   Constitutional: Negative for chills, fatigue and fever.   Respiratory: Negative for cough, shortness of breath and wheezing.    Cardiovascular: Negative for chest pain, palpitations and leg swelling.     Physical Exam:  Vital signs: T: Unavailable   BP: 148/70   P: 81   R: 21   sat: 97%  Physical Exam   GENERAL/CONSTITUTIONAL: No distress.    HEENT: atraumatic, normocephalic, high flow nasal cannula in place  NOSE: normal with nasal oxygen  NECK: jugular veins nondistended  CHEST: no paradox, no retractions.  No respiratory distress.  CARDIAC: Regular rhythm  ABDOMEN: nondistended  :  Deferred  EXTREMITIES: No edema.  NEURO: Alert and oriented  SKIN: no jaundice.  No rash   Lab Data:  Results from last 7 days   Lab Units 02/17/21  0412 02/16/21  0814 02/15/21  0526   WBC 10*3/mm3 7.06 9.80 9.83   HEMOGLOBIN g/dL 14.2 15.1 14.7   PLATELETS 10*3/mm3 242 295 301     Results from last 7 days   Lab Units 02/17/21  0412 02/16/21  0814 02/15/21  0526   SODIUM mmol/L 138 137 136   POTASSIUM mmol/L 3.5 3.9 4.1   CO2 mmol/L 32.0* 31.0* 32.0*   BUN mg/dL 20 23 20   CREATININE mg/dL 0.72* 0.62* 0.64*   GLUCOSE mg/dL 87 85 93         No results found for: BLOODCX, URINECX, WOUNDCX, MRSACX, RESPCX, STOOLCX  Lab Results   Component Value Date    PROBNP 298.8 02/02/2021     Recent radiology:   Imaging Results (Last 72 Hours)     ** No results found for the last 72 hours. **        My radiograph interpretation/independent review of imaging: No new imaging 2/17/2021  Other test results (not lab or imaging):   Results for orders placed during the hospital encounter of 02/02/21   Adult Transthoracic Echo Complete W/ Cont if Necessary Per Protocol    Narrative · The left ventricular cavity is moderately dilated.  · Left ventricular wall thickness is consistent with mild to moderate   concentric hypertrophy.  · Mild aortic valve stenosis is present.  · Left ventricular diastolic function is consistent with (grade I)   impaired relaxation.  · Left ventricular ejection fraction appears to be 56 - 60%. Left   ventricular systolic function is normal.      Echo as noted above  Independent review of ekg: Normal sinus with a rate of 81 on telemetry    Problem List as identified by Epic (may contain historical, inactive problems)  Patient Active Problem List   Diagnosis   • Essential hypertension   • Hypokalemia   • Colon cancer (CMS/HCC)   • Pneumonia due to COVID-19 virus   • Acute respiratory failure with hypoxia (CMS/HCC)   • Obesity (BMI 30-39.9)   • Cytokine release syndrome, grade 1   • Cytokine release syndrome, grade  2   • Cytokine release syndrome, grade 3   • Cytokine release syndrome, grade 4     Pulmonary Assessment:     1. SARS Covid-19 viral pneumonia -remdesivir completed  2. Acute respiratory failure with hypoxia related to COVID-19  3. Bilateral lung infiltrates  4. History of colon cancer  5. Obese  6. Obstructive sleep apnea, does not use his CPAP     Recommend:      · Continue high flow cannula 4L and wean down oxygen to keep sats above 88%  · Proning candidate: Yes, if able to  · Completed 10 days of Decadron.    · DVT prophylaxis-Lovenox 40 mg daily  · Stress ulcer prophylaxis-Pepcid twice daily  · Zinc, vitamin D, vitamin C, and melatonin  · Antibiotic-Merrem completed  · IS/OPEP, albuterol HFA  · Nutrition and mobilization physical therapy as tolerated  · Repeat labs and imaging as needed  · Continue contact and respiratory isolation  · He will need outpatient pulmonary clinic follow-up for sleep apnea as well as home oxygen evaluation prior to discharge.  · Further recommendations per Dr. Ewing    Thank you for the consult.  We will follow along.    Electronically signed by VALERIA Bernard on 2/17/2021 at 14:13 CST     ATTESTATION OF CLINICAL NOTE:  I have reviewed the notes, assessments, and/or procedures performed by Angelika SHAW, I concur with her/his documentation of Jaysonwarren Spence.  Patient was seen in the follow-up visit in pulmonary rounds in LTAC unit today.  He was seen by me as a pulmonary consult and was followed in the intensive care unit.  He moved to the LTAC unit yesterday.    Patient is a 70 y.o.  male born on 1950.  Patient has presented to [facility] on 2/3/2021 with continuous worsening and severe shortness of breath in chest for 14 days in context Covid-19, with respiratory deterioration requiring escalation of oxygen therapy. His other additional history was low-grade fevers,cough ,congestion, watery eyes for 2 weeks. He was tested for Covid outside  the hospital but the tests also are pending.  In the meantime he was seen by the primary care provider as outpatient and apparently he was treated by his primary care doctor with a believe Z-Rashid and doxycycline  with Decadron p.o.  He is also been taking zinc and vitamin C.     His symptoms did not improve and he decided to come to the hospital.He presented to [facility] yesterday to worsening shortness of breath.  He was later transferred to the [facility] for higher level of care.  He was initially admitted in the COVID-19 isolation floor and later moved to the intensive care unit due to high oxygen requirement.  On arrival his oxygen sat was 81% on room air.  He had chest x-rays and CT scans of the chest that showed bilateral interstitial infiltrates consistent with Covid.  D-dimers were elevated but no PEs were noted.  His Covid test is positive.      He was initially treated with Vapotherm and BiPAP at night.  He did have treatment with remdesivir Decadron and also received convalescent plasma.  He had adjunct treatment for COVID-19 as well.  He did some prone ventilation.  He later had his oxygen weaned down from Vapotherm to 6 L yesterday and 4 L today and continues to improve.  He however continues to use BiPAP at night and is doing well on BiPAP tolerance but he has a history of sleep apnea and apparently had a CPAP/BiPAP at home which she had not been using prior to this hospitalization.  Due to to high oxygen requirement and BiPAP need he was moved to the LTAC to continue rehab.  Of note patient is a lifelong non-smoker and did not have any prior history of any lung problems or oxygen use at home.    He was seen in the LTAC unit from outside the room and he did very well today and did a thumbs up to me.  He was seen from outside the room to reduce the risk of cross infection and exposure and to minimize use of PPE.  He reported he is feeling much better and did not have any  new complaint.    On physical examination my nursing assessment patient is comfortable sitting up in the chair working with physical therapy.  HEENT: Atraumatic normocephalic.  Neck: Supple no mass no JVD.  Heart: Sounds normal sinus rhythm.  Lungs: Bibasilar crackles.  Abdomen: Soft nondistended nontender.  Extremities: No pedal edema.  Neurology: Grossly intact.  Skin: No breakdown.    Patient completed treatment for COVID-19 with remdesivir plasma and Decadron and will continue the adjunct treatment.  Supportive respiratory care and bronchodilator treatment.  Continue nutritional support.  PT/OT/SLP.  Reviewed labs and imaging studies from time to time.  DVT and stress ulcer prophylaxis and pain and anxiety control.  He will need home oxygen evaluation and need to continue using his CPAP or BiPAP at home for the sleep apnea.  He will also need outpatient pulmonary clinic follow-up for his ongoing respiratory issues.  He has been a lifelong non-smoker and did not have any underlying COPD.  Pulmonary team will continue following him and make further recommendations.  We appreciate the consult from Dr. Hanson and will continue to follow.  Total time spent in seeing this patient is a pulmonary inpatient consult 45 minutes.    I have seen and examined patient personally, performing a face-to-face diagnostic evaluation with plan of care reviewed and developed with APRN and nursing staff. I have addended and/or modified the above history of present illness, physical examination, and assessment and plan to reflect my findings and impressions. Essential elements of the care plan were discussed with APRN above.  Agree with findings and assessment/plan as documented above.    Cora Ewing MD  Pulmonologist/Intensivist  2/17/2021 14:38 CST

## 2021-02-17 NOTE — THERAPY DISCHARGE NOTE
Acute Care - Physical Therapy Discharge Summary  [facility]       Patient Name: Santiago Spence  : 1950  MRN: [id number]    Today's Date: 2021                 Admit Date: 2021      PT Recommendation and Plan    Visit Dx:    ICD-10-CM ICD-9-CM   1. Impaired mobility  Z74.09 799.89               Rehab Goal Summary     Row Name 21 1038             Bed Mobility Goal 1 (PT)    Activity/Assistive Device (Bed Mobility Goal 1, PT)  sit to supine/supine to sit  -AB      [place] Level/Cues Needed (Bed Mobility Goal 1, PT)  independent  -AB      Time Frame (Bed Mobility Goal 1, PT)  long term goal (LTG);10 days  -AB      Progress/Outcomes (Bed Mobility Goal 1, PT)  goal not met  -AB         Transfer Goal 1 (PT)    Activity/Assistive Device (Transfer Goal 1, PT)  sit-to-stand/stand-to-sit;bed-to-chair/chair-to-bed  -AB      [place] Level/Cues Needed (Transfer Goal 1, PT)  independent  -AB      Time Frame (Transfer Goal 1, PT)  long term goal (LTG);10 days  -AB      Progress/Outcome (Transfer Goal 1, PT)  goal not met  -AB         Gait Training Goal 1 (PT)    Activity/Assistive Device (Gait Training Goal 1, PT)  gait (walking locomotion);assistive device use;decrease fall risk;improve balance and speed;increase endurance/gait distance;increase energy conservation  -AB      [place] Level (Gait Training Goal 1, PT)  independent  -AB      Distance (Gait Training Goal 1, PT)  60  -AB      Time Frame (Gait Training Goal 1, PT)  long term goal (LTG);10 days  -AB      Progress/Outcome (Gait Training Goal 1, PT)  goal not met  -AB        User Key  (r) = Recorded By, (t) = Taken By, (c) = Cosigned By    Initials Name Provider Type Discipline    Carolann Frey, PTA Physical Therapy Assistant PT              PT Discharge Summary  Anticipated Discharge Disposition (PT): home with assist, home with 24/7 care, home with home health  Reason for Discharge: Discharge from facility  Outcomes Achieved:  Refer to plan of care for updates on goals achieved  Discharge Destination: MARGARET Emery, PTA   2/17/2021

## 2021-02-17 NOTE — H&P
Valentin Hanson M.D.  VALERIA Leyva          Internal Medicine History and Physical      Name: Santiago Spence  MRN: [id number]     Acct: [id number]  Room: [facility]/1    Admit Date: 2/16/2021  PCP: Meredith Lloyd APRN    Chief Complaint:     Weakness, shortness of breath, need for continued oxygen weaning    History Obtained From:     chart review and the patient    History of Present Illness:      Santiago Spence is a  70 y.o.  male who presents with need for continued oxygen weaning and rehabilitation efforts. The patient had been in his usual state of health when he developed low grade fevers with cough and congestion. He was seen as an outpatient by his PCP and treated with zithromax, doxycycline and decadron. Unfortunately, his symptoms continued to worsen and he presented to an outlying ER where he was found to have significant hypoxia with 02 sats in the 80s. D dimer elevated. CT findings showed no PE, but bilateral ground glass opacities consistent with COVID pneumonia. Covid test was performed and found positive on 2/2. The facility where he presented had no ICU beds available and the patient was transferred to [facility] for higher level of care. He was requiring 02 at 15 lpm on intake. He was treated with Remdesivir, atorvastatin, zinc, vitamin C, IV steroids, convalescent plasma, and supplemental oxygen. He was seen in consultation by ID and pulmonology. He was transitioned to Vapotherm due to increased oxygen demand. The patient was started on empiric IV antibiotics for possible superimposed bacterial pneumonia. He has tolerated oxygen weaning and transferred to our facility for continued oxygen weaning and rehabilitation efforts.     Past Medical History:     Past Medical History:   Diagnosis Date   • Arthritis    • Cancer (CMS/HCC)    • Colon cancer (CMS/HCC)    • Hypertension    • Skin cancer         Past Surgical History:     Past Surgical History:   Procedure Laterality  Date   • COLON SURGERY     • JOINT REPLACEMENT     • SKIN CANCER EXCISION          Medications Prior to Admission:       Prior to Admission medications    Medication Sig Start Date End Date Taking? Authorizing Provider   albuterol sulfate  (90 Base) MCG/ACT inhaler Inhale 2 puffs Every 4 (Four) Hours As Needed for Wheezing or Shortness of Air. 1/29/21   Chriss Tinoco MD   ascorbic acid (VITAMIN C) 500 MG tablet Take 1 tablet by mouth 2 (Two) Times a Day. 2/16/21   Aime Rivera MD   aspirin (Aspirin Adult Low Strength) 81 MG chewable tablet Chew 81 mg Daily.    Chriss Tinoco MD   atorvastatin (LIPITOR) 10 MG tablet Take 1 tablet by mouth Every Night. 2/16/21   Aime Rivera MD   benzonatate (TESSALON) 200 MG capsule Take 1 capsule by mouth Every 4 (Four) Hours As Needed for Cough. 2/16/21   Aime Rivera MD   cholecalciferol (VITAMIN D3) 25 MCG (1000 UT) tablet Take 1 tablet by mouth Daily. 2/17/21   Aime Rivera MD   colchicine 0.6 MG tablet Take 1 tablet by mouth Daily. 2/17/21   Aime Rivera MD   dextromethorphan polistirex ER (DELSYM) 30 MG/5ML Suspension Extended Release oral suspension Take 10 mL by mouth Every 12 (Twelve) Hours As Needed (Cough). 2/16/21   Aime Rivera MD   docusate sodium 100 MG capsule Take 1 capsule by mouth Daily. 2/17/21   Aime Rivera MD   enoxaparin (LOVENOX) 40 MG/0.4ML solution syringe Inject 0.4 mL under the skin into the appropriate area as directed Daily. Indications: Prevention of Unwanted Clot in Veins 2/17/21   Aime Rivera MD   famotidine (PEPCID) 20 MG tablet Take 1 tablet by mouth 2 (Two) Times a Day Before Meals. 2/16/21   Aime Rivera MD   hydroCHLOROthiazide (HYDRODIURIL) 25 MG tablet Take 25 mg by mouth Daily.    Chriss Tinoco MD   melatonin 3 MG tablet Take 1 tablet by mouth Every Night. 2/16/21   Aime Rivera MD   multivitamin (THERAGRAN) tablet tablet Take 1 tablet by mouth Daily.    Provider, MD Chriss    polyethylene glycol (polyethylene glycol) 17 g packet Take 17 g by mouth Daily. 2/17/21   Aime Rivera MD   sodium chloride 0.65 % nasal spray 1 spray into the nostril(s) as directed by provider As Needed for Congestion. 2/16/21   Aime Rivera MD   verapamil SR (CALAN-SR) 180 MG CR tablet Take 1 tablet by mouth Daily. 2/17/21   Aime Rivera MD   zinc sulfate (ZINCATE) 220 (50 Zn) MG capsule Take 1 capsule by mouth Daily. 2/17/21   Aime Rivera MD        Allergies:       Patient has no known allergies.    Social History:     Tobacco:    reports that he has never smoked. He quit smokeless tobacco use about 4 years ago.  Alcohol:      has no history on file for alcohol.  Drug Use:  has no history on file for drug.    Family History:     Family History   Problem Relation Age of Onset   • Hypertension Mother    • Coronary artery disease Father        Review of Systems:     Review of Systems   Constitution: Positive for malaise/fatigue. Negative for chills, decreased appetite, weight gain and weight loss.   HENT: Negative for congestion, ear discharge, hoarse voice and tinnitus.    Eyes: Negative for blurred vision, discharge, visual disturbance and visual halos.   Cardiovascular: Positive for dyspnea on exertion. Negative for chest pain, claudication, irregular heartbeat, leg swelling, orthopnea and paroxysmal nocturnal dyspnea.   Respiratory: Negative for cough, shortness of breath, sputum production and wheezing.    Endocrine: Negative for cold intolerance, heat intolerance and polyuria.   Hematologic/Lymphatic: Negative for adenopathy. Does not bruise/bleed easily.   Skin: Negative for dry skin, itching and suspicious lesions.   Musculoskeletal: Negative for arthritis, back pain, falls, joint pain, muscle weakness and myalgias.   Gastrointestinal: Negative for abdominal pain, constipation, diarrhea, dysphagia and hematemesis.   Genitourinary: Negative for bladder incontinence, dysuria and frequency.  "  Neurological: Positive for weakness. Negative for aphonia, disturbances in coordination and dizziness.   Psychiatric/Behavioral: Negative for altered mental status, depression, memory loss and substance abuse. The patient does not have insomnia and is not nervous/anxious.        Code Status:    There are no questions and answers to display.       Physical Exam:     Vitals:  Ht 162.6 cm (64\")   Wt 92.1 kg (203 lb)   BMI 34.84 kg/m²   T 98.2 P 81 R 21 /70 Sp02 97% (4 lpm)  Physical Exam  Vitals signs and nursing note reviewed.   Constitutional:       Appearance: Normal appearance. He is well-developed.   HENT:      Head: Normocephalic and atraumatic.      Right Ear: External ear normal.      Left Ear: External ear normal.      Nose: Nose normal.      Mouth/Throat:      Mouth: Mucous membranes are moist.      Pharynx: Oropharynx is clear.   Eyes:      Pupils: Pupils are equal, round, and reactive to light.   Neck:      Musculoskeletal: Normal range of motion and neck supple.   Cardiovascular:      Rate and Rhythm: Normal rate and regular rhythm.      Heart sounds: Normal heart sounds.   Pulmonary:      Effort: Pulmonary effort is normal.      Breath sounds: Normal breath sounds.   Abdominal:      General: Bowel sounds are normal.      Palpations: Abdomen is soft.   Musculoskeletal: Normal range of motion.      Comments: Generalized weakness   Skin:     General: Skin is warm and dry.   Neurological:      Mental Status: He is alert and oriented to person, place, and time.      Deep Tendon Reflexes: Reflexes are normal and symmetric.   Psychiatric:         Behavior: Behavior normal.       Data:     Lab Results (last 7 days)     Procedure Component Value Units Date/Time    Prealbumin [[id number]]  (Normal) Collected: 02/17/21 0412    Specimen: Blood Updated: 02/17/21 1244     Prealbumin 31.1 mg/dL     Comprehensive Metabolic Panel [[id number]]  (Abnormal) Collected: 02/17/21 0412    Specimen: Blood Updated: " 02/17/21 0452     Glucose 87 mg/dL      BUN 20 mg/dL      Creatinine 0.72 mg/dL      Sodium 138 mmol/L      Potassium 3.5 mmol/L      Chloride 100 mmol/L      CO2 32.0 mmol/L      Calcium 8.2 mg/dL      Total Protein 5.3 g/dL      Albumin 2.80 g/dL      ALT (SGPT) 29 U/L      AST (SGOT) 17 U/L      Alkaline Phosphatase 61 U/L      Total Bilirubin 0.6 mg/dL      eGFR Non African Amer 107 mL/min/1.73      Globulin 2.5 gm/dL      A/G Ratio 1.1 g/dL      BUN/Creatinine Ratio 27.8     Anion Gap 6.0 mmol/L     Narrative:      GFR Normal >60  Chronic Kidney Disease <60  Kidney Failure <15      CBC & Differential [[id number]]  (Abnormal) Collected: 02/17/21 0412    Specimen: Blood Updated: 02/17/21 0432    Narrative:      The following orders were created for panel order CBC & Differential.  Procedure                               Abnormality         Status                     ---------                               -----------         ------                     CBC Auto Differential[[id number]]        Abnormal            Final result                 Please view results for these tests on the individual orders.    CBC Auto Differential [[id number]]  (Abnormal) Collected: 02/17/21 0412    Specimen: Blood Updated: 02/17/21 0432     WBC 7.06 10*3/mm3      RBC 4.49 10*6/mm3      Hemoglobin 14.2 g/dL      Hematocrit 40.5 %      MCV 90.2 fL      MCH 31.6 pg      MCHC 35.1 g/dL      RDW 13.3 %      RDW-SD 43.8 fl      MPV 9.7 fL      Platelets 242 10*3/mm3      Neutrophil % 72.2 %      Lymphocyte % 15.2 %      Monocyte % 10.3 %      Eosinophil % 1.4 %      Basophil % 0.1 %      Immature Grans % 0.8 %      Neutrophils, Absolute 5.09 10*3/mm3      Lymphocytes, Absolute 1.07 10*3/mm3      Monocytes, Absolute 0.73 10*3/mm3      Eosinophils, Absolute 0.10 10*3/mm3      Basophils, Absolute 0.01 10*3/mm3      Immature Grans, Absolute 0.06 10*3/mm3      nRBC 0.0 /100 WBC         Xr Chest 1 View    Result Date: 2/14/2021  Narrative:  EXAMINATION: Chest 1 view 2/14/2021  HISTORY: Covid 19 pneumonia  FINDINGS: Today's exam is compared to previous study of 6 days earlier. There is persistent bilateral mixed interstitial and alveolar pneumonia. There is increased consolidation within the lung bases with partial silhouetting of the diaphragms. The cardiac silhouette is mildly enlarged. There is no free air beneath the hemidiaphragms.      Impression: 1.. Bibasilar pneumonia. This shows some increased consolidation compared to the previous exam of 6 days earlier. This report was finalized on 02/14/2021 13:01 by Dr. Kang Wilkes MD.    Xr Chest 1 View    Result Date: 2/8/2021  Narrative: EXAMINATION: XR CHEST 1 VW-  2/8/2021 12:55 PM CST  HISTORY: follow up covid  1 view chest x-ray compared with 6 days ago.  Persistent bibasilar infiltrate with partial clearing of the upper lobes.  No pneumothorax.  Stable heart and mediastinum.  Summary: 1. Persistent bibasilar infiltrate. 2. Partial clearing of the upper lobes. This report was finalized on 02/08/2021 14:06 by Dr. Jose Manuel Brown MD.    Xr Chest 1 View    Result Date: 2/2/2021  Narrative: Exam:   XR CHEST 1 VW-   Date:  2/2/2021  History:  Male, age  70 years;sob  COMPARISON:  None.  Findings :  Mild to moderate cardiomegaly.  Bilateral mid to lower lung zone parenchymal opacities. No measurable pneumothorax. No pleural effusion..  The bones show no acute pathology.       Impression: Impression:  Bilateral mid to lower lung zone opacities in the background of cardiomegaly. Consider fluid overload. Superimposed infection is not excluded.  This report was finalized on 02/02/2021 13:24 by Dr. Ekta Landeros MD.        Assessment:       * No active hospital problems. *    Past Medical History:   Diagnosis Date   • Arthritis    • Cancer (CMS/HCC)    • Colon cancer (CMS/HCC)    • Hypertension    • Skin cancer        Plan:     1. Acute hypoxic respiratory failure  2. COVID-19  pneumonia  3. HTN  4. GERD    Continue current treatment. Monitor counts. Increase activity. Labs in am. Continue oxygen weaning as tolerated. Aggressive therapies as tolerated.       Electronically signed by VALERIA Peña on 2/17/2021 at 19:24 CST     Copy sent to Meredith May APRN  I have discussed the care of Santiago Spence, including pertinent history and exam findings, with the nurse practitioner.    I have seen and examined the patient and the key elements of all parts of the encounter have been performed by me.  I agree with the assessment, plan and orders as documented by VALERIA Leyva, after I modified the exam findings and the plan of treatments and the final version is my approved version of the assessment.        Electronically signed by Kojo Hanson MD on 2/17/2021 at 20:12 CST

## 2021-02-17 NOTE — PROGRESS NOTES
"Adult Nutrition  Assessment/PES    Patient Name:  Santiago Spence  YOB: 1950  MRN: [id number]  Admit Date:  2/16/2021    Assessment Date:  2/17/2021    Comments:  New admission to LTACH. Pt with good appetite per nurse today. Cont to follow.    Reason for Assessment     Row Name 02/17/21 1504          Reason for Assessment    Reason For Assessment  per organizational policy ltach admission     Diagnosis  infection/sepsis;pulmonary disease         Nutrition/Diet History     Row Name 02/17/21 1505          Nutrition/Diet History    Typical Food/Fluid Intake  Pt is in isolation due to COVID 19+ restrictions at this time. Nurse reports pt has good appetite.         Anthropometrics     Row Name 02/17/21 1506          Anthropometrics    Height  162.6 cm (64\")     Weight  92.1 kg (203 lb)        Admit Weight    Admit Weight  92.1 kg (203 lb)        Ideal Body Weight (IBW)    Ideal Body Weight (IBW) (kg)  59.72     % Ideal Body Weight  154.2        Body Mass Index (BMI)    BMI (kg/m2)  34.92     BMI Assessment  BMI 30-34.9: obesity grade I         Labs/Tests/Procedures/Meds     Row Name 02/17/21 1507          Labs/Procedures/Meds    Lab Results Reviewed  reviewed        Medications    Pertinent Medications Reviewed  reviewed     Pertinent Medications Comments  see MAR         Physical Findings     Row Name 02/17/21 1507          Physical Findings    Overall Physical Appearance  on oxygen therapy;obese     Gastrointestinal  other (see comments) BM 2/17     Skin  other (see comments) Raphael score 20; coccyx red         Estimated/Assessed Needs     Row Name 02/17/21 1509 02/17/21 1506       Calculation Measurements    Weight Used For Calculations  92.1 kg (203 lb)  --    Height  --  162.6 cm (64\")       Estimated/Assessed Needs    Additional Documentation  Calorie Requirements (Group);Protein Requirements (Group);Fluid Requirements (Group)  --       Calorie Requirements    Estimated Calorie Requirement (kcal/day) "  1841  --    Estimated Calorie Need Method  kcal/kg  --    Estimated Calorie Requirement Comment  0057-8947  --       KCAL/KG    KCAL/KG  20 Kcal/Kg (kcal)  --    20 Kcal/Kg (kcal)  1841.6  --       Protein Requirements    Weight Used For Protein Calculations  59 kg (130 lb)  --    Est Protein Requirement Amount (gms/kg)  1.8 gm protein  --    Estimated Protein Requirements (gms/day)  106.14  --       Fluid Requirements    Fluid Requirements (mL/day)  -- 0164-2949  --    Estimated Fluid Requirement Method  other (see comments) 1ml/kcal  --        Nutrition Prescription Ordered     Row Name 02/17/21 1515          Nutrition Prescription PO    Current PO Diet  Regular     Fluid Consistency  Thin     Common Modifiers  Cardiac         Evaluation of Received Nutrient/Fluid Intake     Row Name 02/17/21 1515          Nutrient/Fluid Evaluation    Number of Days Evaluated  2 days     Additional Documentation  Intake Assessment (Group)        Fluid Intake Evaluation    Oral Fluid (mL)  720        PO Evaluation    Number of Meals  2     % PO Intake  100           Problem/Interventions:  Problem 1     Row Name 02/17/21 1516          Nutrition Diagnoses Problem 1    Problem 1  Nutrition Appropriate for Condition at this Time     Etiology (related to)  Factors Affecting Nutrition     Reported/Observed By  RN     Appetite  Good     Signs/Symptoms (evidenced by)  PO Intake     Percent (%) intake recorded  100 %     Over number of meals  2           Intervention Goal     Row Name 02/17/21 1517          Intervention Goal    General  Maintain nutrition;Disease management/therapy;Meet nutritional needs for age/condition;Reduce/improve symptoms     PO  Meet estimated needs;Continue positive trend;Maintain intake     Weight  Maintain weight         Nutrition Intervention     Row Name 02/17/21 1517          Nutrition Intervention    RD/Tech Action  Follow Tx progress;Care plan reviewd           Education/Evaluation     Row Name 02/17/21  1517          Education    Education  No discharge needs identified at this time        Monitor/Evaluation    Monitor  Per protocol         Electronically signed by:  Jessica Gandara MS,RDN,LD  02/17/21 15:18 CST

## 2021-02-18 PROCEDURE — 99232 SBSQ HOSP IP/OBS MODERATE 35: CPT | Performed by: INTERNAL MEDICINE

## 2021-02-18 PROCEDURE — 25010000002 ENOXAPARIN PER 10 MG: Performed by: INTERNAL MEDICINE

## 2021-02-18 PROCEDURE — 97530 THERAPEUTIC ACTIVITIES: CPT | Performed by: OCCUPATIONAL THERAPIST

## 2021-02-18 PROCEDURE — 97110 THERAPEUTIC EXERCISES: CPT | Performed by: OCCUPATIONAL THERAPIST

## 2021-02-18 PROCEDURE — 97110 THERAPEUTIC EXERCISES: CPT

## 2021-02-18 PROCEDURE — 97116 GAIT TRAINING THERAPY: CPT

## 2021-02-18 NOTE — PROGRESS NOTES
PULMONARY AND CRITICAL CARE PROGRESS NOTE - [facility]    Patient: Santiago Spence  1950   MR# [id number]   Acct# [id number]  02/18/21   10:17 CST  Referring Provider: Kojo Hanson MD    Chief Complaint: Covid-19 pneumonia, acute respiratory failure    Interval history:   Patient is seen on the Covid-19 isolation lockett of the LTAC via through the glass window on the door as well as the video monitoring system. He is sitting up in the bedside chair awake. He indicates to me he is currently on 3L HF NC. He is using his IS. He is giving me the thumbs up that he is doing ok. His vital signs are stable. No new labs, ABGs, or CXR to review.     Meds:  albuterol sulfate HFA, 2 puff, Inhalation, 4x Daily - RT  amLODIPine, 5 mg, Oral, Q24H  ascorbic acid, 500 mg, Oral, BID  aspirin, 81 mg, Oral, Daily  atorvastatin, 10 mg, Oral, Nightly  cholecalciferol, 1,000 Units, Oral, Daily  colchicine, 0.6 mg, Oral, Daily  docusate sodium, 100 mg, Oral, Daily  enoxaparin, 40 mg, Subcutaneous, Q12H  famotidine, 20 mg, Oral, BID AC  hydroCHLOROthiazide, 25 mg, Oral, Daily  melatonin, 3 mg, Oral, Nightly  multivitamin and minerals, 15 mL, Oral, Daily  polyethylene glycol, 17 g, Oral, Daily  sodium chloride, 10 mL, Intravenous, Q12H  verapamil SR, 180 mg, Oral, Q24H  alise's amazing butt, , Topical, TID  zinc sulfate, 220 mg, Oral, Daily         Review of Systems:   Review of Systems - General ROS: negative for - chills, fatigue or fever  Respiratory ROS: no cough, shortness of breath, or wheezing  Cardiovascular ROS: no chest pain or dyspnea on exertion    Physical Exam:  There were no vitals filed for this visit.     No intake or output data in the 24 hours ending 02/18/21 1017  Vital signs reviewed: Assessment based on discussion with patient and nursing   GENERAL/CONSTITUTIONAL: no distress.  High flow oxygen in place.  HEENT: atraumatic, normocephalic  NOSE: normal  NECK: jugular veins  nondistended  CHEST: no paradox, no retractions.  No respiratory distress.   CARDIAC: regular rhythm  ABDOMEN: nondistended  : deferred  EXTREMITIES: no edema.  NEURO:  Alert and oriented x 3  SKIN: no jaundice.  No rash  Laboratory Data:  Results from last 7 days   Lab Units 02/17/21 0412 02/16/21  0814 02/15/21  0526   WBC 10*3/mm3 7.06 9.80 9.83   HEMOGLOBIN g/dL 14.2 15.1 14.7   PLATELETS 10*3/mm3 242 295 301     Results from last 7 days   Lab Units 02/17/21  0412 02/16/21  0814 02/15/21  0526  02/13/21  0641   SODIUM mmol/L 138 137 136   < > 133*   POTASSIUM mmol/L 3.5 3.9 4.1   < > 4.1   BUN mg/dL 20 23 20   < > 19   CREATININE mg/dL 0.72* 0.62* 0.64*   < > 0.55*   D DIMER QUANT mg/L (FEU)  --   --  0.56*  --  1.15*    < > = values in this interval not displayed.         Blood Culture   Date Value Ref Range Status   02/02/2021 No growth at 2 days  Preliminary   02/02/2021 No growth at 2 days  Preliminary     Respiratory Culture   Date Value Ref Range Status   02/02/2021   Preliminary    Heavy growth (4+) Normal Respiratory Jazz: NO S.aureus/MRSA or Pseudomonas aeruginosa     Recent films:  No radiology results for the last day  Films reviewed personally by me.  My interpretation: None today 02/18/2021    Pulmonary Assessment:    1. SARS Covid-19 viral pneumonia -remdesivir completed  2. Acute respiratory failure with hypoxia related to COVID-19  3. Bilateral lung infiltrates  4. History of colon cancer  5. Obese  6. Obstructive sleep apnea, does not use his CPAP at home    Recommend:     · Continue high flow cannula 3L and wean down oxygen to keep sats above 88%  · Continue BiPAP for sleep  · Proning candidate: Yes  · Completed 10 days of Decadron.    · DVT prophylaxis-Lovenox 40 mg daily  · Stress ulcer prophylaxis-Pepcid twice daily  · Zinc, vitamin D, vitamin C, and melatonin  · Antibiotic-Merrem completed  · IS/OPEP, albuterol HFA  · Nutrition  · Mobilization     Electronically signed by: Jennifer Gr  VALERIA Blanc 2/18/2021 10:17 CST     ATTESTATION OF CLINICAL NOTE:  I have reviewed the notes, assessments, and/or procedures performed by VALERIA Bernard, I concur with her/his documentation of Santiago Spence.  Patient was seen from outside the room to reduce the risk of cross infection exposure and to minimize use of PPE.    He is doing well and respiratory status is stable.  He is currently on 3 to 4 L of oxygen and doing incentive spirometry and flutter valve.  He is using BiPAP at night without any difficulty.  He has known history of sleep apnea.  Completed treatment for COVID-19 and is currently getting adjunct treatment.  He is doing physical activity as tolerated.  Antibiotic has been completed.  Is afebrile and did not have any acute events.    On physical examination as per nursing assessment patient is comfortable in no distress. HEENT: Atraumatic normocephalic.  Neck: Supple no mass no JVD.  Heart: Sounds normal sinus rhythm.  Lungs: Bibasilar crackles.  Abdomen: Soft nondistended nontender.  Extremities: No pedal edema.  Neurology: Grossly intact.  Skin: No breakdown.    Continue current treatment plan and supportive respiratory care and keep titrating oxygen to keep oxygen more than 92%.  Patient completed remdesivir and Decadron and is on adjunct treatment.  Continue nutritional support.  Continue incentive spirometry and Aerobika.  Continue DVT and stress ulcer present pain anxiety control.  Repeat labs from time to time.  Patient will need outpatient follow-up for sleep apnea and will need to get BiPAP set up at home.  He had a BiPAP or CPAP at home which he did not use consistently.  Repeat labs and imaging studies from time to time.  Continue respiratory and contact isolation's..  Pulmonary team will continue following him and make further recommendations.      I have seen and examined patient personally, performing a face-to-face diagnostic evaluation with plan of care reviewed and  developed with APRN and nursing staff. I have addended and/or modified the above history of present illness, physical examination, and assessment and plan to reflect my findings and impressions. Essential elements of the care plan were discussed with APRN above.  Agree with findings and assessment/plan as documented above.    Cora Ewing MD  Pulmonologist/Intensivist  2/18/2021 13:42 CST

## 2021-02-19 PROCEDURE — 97110 THERAPEUTIC EXERCISES: CPT

## 2021-02-19 PROCEDURE — 25010000002 ENOXAPARIN PER 10 MG: Performed by: INTERNAL MEDICINE

## 2021-02-19 PROCEDURE — 99232 SBSQ HOSP IP/OBS MODERATE 35: CPT | Performed by: INTERNAL MEDICINE

## 2021-02-19 PROCEDURE — 97116 GAIT TRAINING THERAPY: CPT

## 2021-02-19 NOTE — PROGRESS NOTES
YENNY Dodson APRN        Internal Medicine Progress Note    2/19/2021   14:54 CST    Name:  Santiago Spence  MRN:    [id number]     Acct:     [id number]   Room:  [facility] Day: 0     Admit Date: 2/16/2021  5:28 PM  PCP: Meredith Lloyd APRN    Subjective:     C/C: weakness, shortness of breath    Interval History: Status:  Improved. Up to chair. No family at bedside. Independent for ADLs. Tolerating oxygen weaning. Denies pain. Anxious to progress and prepare for discharge.     Review of Systems   Constitution: Negative for chills, decreased appetite, malaise/fatigue, weight gain and weight loss.   HENT: Negative for congestion, ear discharge, hoarse voice and tinnitus.    Eyes: Negative for blurred vision, discharge, visual disturbance and visual halos.   Cardiovascular: Negative for chest pain, claudication, dyspnea on exertion, irregular heartbeat, leg swelling, orthopnea and paroxysmal nocturnal dyspnea.   Respiratory: Negative for cough, shortness of breath, sputum production and wheezing.    Endocrine: Negative for cold intolerance, heat intolerance and polyuria.   Hematologic/Lymphatic: Negative for adenopathy. Does not bruise/bleed easily.   Skin: Negative for dry skin, itching and suspicious lesions.   Musculoskeletal: Negative for arthritis, back pain, falls, joint pain, muscle weakness and myalgias.   Gastrointestinal: Negative for abdominal pain, constipation, diarrhea, dysphagia and hematemesis.   Genitourinary: Negative for bladder incontinence, dysuria and frequency.   Neurological: Negative for aphonia, disturbances in coordination, dizziness and weakness.   Psychiatric/Behavioral: Negative for altered mental status, depression, memory loss and substance abuse. The patient does not have insomnia and is not nervous/anxious.          Medications:     Allergies: No Known Allergies    Current Meds:   Current Facility-Administered Medications:   •   acetaminophen (TYLENOL) tablet 650 mg, 650 mg, Oral, Q4H PRN **OR** acetaminophen (TYLENOL) suppository 650 mg, 650 mg, Rectal, Q4H PRN, Kojo Hanson MD  •  albuterol sulfate HFA (PROVENTIL HFA;VENTOLIN HFA;PROAIR HFA) inhaler 2 puff, 2 puff, Inhalation, 4x Daily - RT, Kojo Hanson MD  •  amLODIPine (NORVASC) tablet 5 mg, 5 mg, Oral, Q24H, Kojo Hanson MD  •  ascorbic acid (VITAMIN C) tablet 500 mg, 500 mg, Oral, BID, Kojo Hanson MD  •  aspirin chewable tablet 81 mg, 81 mg, Oral, Daily, Kojo Hanson MD  •  atorvastatin (LIPITOR) tablet 10 mg, 10 mg, Oral, Nightly, Kojo Hanson MD  •  benzonatate (TESSALON) capsule 200 mg, 200 mg, Oral, Q4H PRN, Kojo Hanson MD  •  cholecalciferol (VITAMIN D3) tablet 1,000 Units, 1,000 Units, Oral, Daily, Kojo Hanson MD  •  colchicine tablet 0.6 mg, 0.6 mg, Oral, Daily, Kojo Hanson MD  •  dextromethorphan polistirex ER (DELSYM) 30 MG/5ML oral suspension 60 mg, 60 mg, Oral, Q12H PRN, Kojo Hanson MD  •  docusate sodium (COLACE) capsule 100 mg, 100 mg, Oral, Daily, Kojo Hanson MD  •  enoxaparin (LOVENOX) syringe 40 mg, 40 mg, Subcutaneous, Q12H, Kojo Hanson MD  •  famotidine (PEPCID) tablet 20 mg, 20 mg, Oral, BID AC, Kojo Hanson MD  •  hydroCHLOROthiazide (HYDRODIURIL) tablet 25 mg, 25 mg, Oral, Daily, Kojo Hanson MD  •  labetalol (NORMODYNE,TRANDATE) injection 10 mg, 10 mg, Intravenous, Q4H PRN, Kojo Hanson MD  •  melatonin tablet 3 mg, 3 mg, Oral, Nightly, Kojo Hanson MD  •  multivitamin and minerals liquid 15 mL, 15 mL, Oral, Daily, Kojo Hanson MD  •  ondansetron (ZOFRAN) tablet 4 mg, 4 mg, Oral, Q6H PRN **OR** ondansetron (ZOFRAN) injection 4 mg, 4 mg, Intravenous, Q6H PRN, Kojo Hanson MD  •  polyethylene glycol (MIRALAX) packet 17 g, 17 g, Oral, Daily, Kojo Hanson MD  •  sodium  "chloride 0.9 % flush 10 mL, 10 mL, Intravenous, Q12H, Kojo Hanson MD  •  sodium chloride 0.9 % flush 10 mL, 10 mL, Intravenous, PRN, Kojo Hanson MD  •  sodium chloride nasal spray 1 spray, 1 spray, Each Nare, PRN, Kojo Hanson MD  •  verapamil SR (CALAN-SR) CR tablet 180 mg, 180 mg, Oral, Q24H, Kojo Hanson MD  •  Shaunna's amazing butt cream, , Topical, TID, Abbey Haddad APRN  •  Shaunna's amazing butt cream, , Topical, PRN, Abbey Haddad APRN  •  zinc sulfate (ZINCATE) capsule 220 mg, 220 mg, Oral, Daily, Kojo Hanson MD    Data:     Code Status:    There are no questions and answers to display.       Family History   Problem Relation Age of Onset   • Hypertension Mother    • Coronary artery disease Father        Social History     Socioeconomic History   • Marital status:      Spouse name: Not on file   • Number of children: Not on file   • Years of education: Not on file   • Highest education level: Not on file   Tobacco Use   • Smoking status: Never Smoker   • Smokeless tobacco: Former User       Vitals:  Ht 162.6 cm (64\")   Wt 92.1 kg (203 lb)   BMI 34.84 kg/m²   T 98.2 P 78 R 20 /78 Sp02 97% (6 lpm)          I/O (24Hr):  No intake or output data in the 24 hours ending 02/19/21 1454    Labs and imaging:      No results found for this or any previous visit (from the past 12 hour(s)).        Physical Examination:        Physical Exam  Vitals signs and nursing note reviewed.   Constitutional:       Appearance: He is well-developed.   HENT:      Head: Normocephalic and atraumatic.      Nose: Nose normal.   Eyes:      Pupils: Pupils are equal, round, and reactive to light.   Neck:      Musculoskeletal: Normal range of motion and neck supple.   Cardiovascular:      Rate and Rhythm: Normal rate and regular rhythm.      Heart sounds: Normal heart sounds.   Pulmonary:      Effort: Pulmonary effort is normal.      Breath sounds: " Normal breath sounds.   Abdominal:      General: Bowel sounds are normal.      Palpations: Abdomen is soft.   Musculoskeletal: Normal range of motion.   Skin:     General: Skin is warm and dry.   Neurological:      Mental Status: He is alert and oriented to person, place, and time.      Deep Tendon Reflexes: Reflexes are normal and symmetric.   Psychiatric:         Behavior: Behavior normal.           Assessment:            * No active hospital problems. *    Past Medical History:   Diagnosis Date   • Arthritis    • Cancer (CMS/HCC)    • Colon cancer (CMS/HCC)    • Hypertension    • Skin cancer         Plan:        1. Acute hypoxic respiratory failure  2. COVID-19 pneumonia  3. HTN  4. GERD    Continue current treatment. Monitor counts. Increase activity. Labs Monday. Continue oxygen weaning as tolerated.       Electronically signed by VALERIA Peña on 2/19/2021 at 14:54 CST

## 2021-02-19 NOTE — PROGRESS NOTES
PULMONARY AND CRITICAL CARE PROGRESS NOTE - [facility]    Patient: Santiago Spence  1950   MR# [id number]   Acct# [id number]  02/19/21   11:33 CST  Referring Provider: Kojo Hanson MD    Chief Complaint: Covid-19 pneumonia, acute respiratory failure    Interval history:   Patient is seen on the Covid-19 isolation lockett of the LT[facility] via through the glass window on the door as well as the video monitoring system. He is sitting up in the bedside chair awake. RT states he is on 3-4 L NC. He likes to use the BIPAP at night. He has a sleep study ordered.  He is using his IS. He is giving me the thumbs up that he is doing ok. His vital signs are stable. No new labs, ABGs, or CXR to review.     Meds:  albuterol sulfate HFA, 2 puff, Inhalation, 4x Daily - RT  amLODIPine, 5 mg, Oral, Q24H  ascorbic acid, 500 mg, Oral, BID  aspirin, 81 mg, Oral, Daily  atorvastatin, 10 mg, Oral, Nightly  cholecalciferol, 1,000 Units, Oral, Daily  colchicine, 0.6 mg, Oral, Daily  docusate sodium, 100 mg, Oral, Daily  enoxaparin, 40 mg, Subcutaneous, Q12H  famotidine, 20 mg, Oral, BID AC  hydroCHLOROthiazide, 25 mg, Oral, Daily  melatonin, 3 mg, Oral, Nightly  multivitamin and minerals, 15 mL, Oral, Daily  polyethylene glycol, 17 g, Oral, Daily  sodium chloride, 10 mL, Intravenous, Q12H  verapamil SR, 180 mg, Oral, Q24H  alise's amazing butt, , Topical, TID  zinc sulfate, 220 mg, Oral, Daily         Review of Systems:   Review of Systems - General ROS: negative for - chills, fatigue or fever  Respiratory ROS: no cough, shortness of breath, or wheezing  Cardiovascular ROS: no chest pain or dyspnea on exertion    Physical Exam:  VS: T: 97.9, P: 84, RR: 21, BP: 123/62, sat: 93%    No intake or output data in the 24 hours ending 02/19/21 1133  Vital signs reviewed: Assessment based on discussion with patient and nursing   GENERAL/CONSTITUTIONAL: no distress.  High flow oxygen in place.  HEENT: atraumatic,  normocephalic  NOSE: normal  NECK: jugular veins nondistended  CHEST: no paradox, no retractions.  No respiratory distress.   CARDIAC: regular rhythm  ABDOMEN: nondistended  : deferred  EXTREMITIES: no edema.  NEURO:  Alert and oriented x 3  SKIN: no jaundice.  No rash  Laboratory Data:  Results from last 7 days   Lab Units 02/17/21  0412 02/16/21  0814 02/15/21  0526   WBC 10*3/mm3 7.06 9.80 9.83   HEMOGLOBIN g/dL 14.2 15.1 14.7   PLATELETS 10*3/mm3 242 295 301     Results from last 7 days   Lab Units 02/17/21  0412 02/16/21  0814 02/15/21  0526  02/13/21  0641   SODIUM mmol/L 138 137 136   < > 133*   POTASSIUM mmol/L 3.5 3.9 4.1   < > 4.1   BUN mg/dL 20 23 20   < > 19   CREATININE mg/dL 0.72* 0.62* 0.64*   < > 0.55*   D DIMER QUANT mg/L (FEU)  --   --  0.56*  --  1.15*    < > = values in this interval not displayed.         Blood Culture   Date Value Ref Range Status   02/02/2021 No growth at 2 days  Preliminary   02/02/2021 No growth at 2 days  Preliminary     Respiratory Culture   Date Value Ref Range Status   02/02/2021   Preliminary    Heavy growth (4+) Normal Respiratory Jazz: NO S.aureus/MRSA or Pseudomonas aeruginosa     Recent films:  No radiology results for the last day  Films reviewed personally by me.  My interpretation: None today 02/19/2021    Pulmonary Assessment:    1. SARS Covid-19 viral pneumonia -remdesivir completed  2. Acute respiratory failure with hypoxia related to COVID-19  3. Bilateral lung infiltrates  4. History of colon cancer  5. Obese  6. Obstructive sleep apnea, does not use his CPAP at home    Recommend:     · Continue high flow cannula 3-4L and wean down oxygen to keep sats above 88%  · Continue BiPAP for sleep  · Proning candidate: Yes  · Completed 10 days of Decadron.    · DVT prophylaxis-Lovenox 40 mg daily  · Stress ulcer prophylaxis-Pepcid twice daily  · Zinc, vitamin D, vitamin C, and melatonin  · Antibiotic-Merrem completed  · IS/OPEP, albuterol  HFA  · Nutrition  · Mobilization     Electronically signed by: VALERIA Bernard 2/19/2021 11:33 CST     ATTESTATION OF CLINICAL NOTE:  I have reviewed the notes, assessments, and/or procedures performed by VALERIA Martinez, I concur with her/his documentation of Santiago Spence. Patient was seen from outside the room to reduce the risk of cross infection exposure and to minimize use of PPE.     He is doing well and respiratory status is stable.  He is currently on 2 L of oxygen and doing incentive spirometry and flutter valve.  He is using BiPAP at night without any difficulty.  He is doing much better with the BiPAP and tolerance is good. He has known history of sleep apnea and was not using BiPAP routinely prior to the hospitalization.  He completed treatment for COVID-19 and is currently getting adjunct treatment.  He is doing physical activity as tolerated.  Antibiotic treatment has been completed.    He remains afebrile and did not have any acute events.     On physical examination as per nursing assessment patient is comfortable in no distress. HEENT: Atraumatic normocephalic.  Neck: Supple no mass no JVD.  Heart: Sounds normal sinus rhythm.  Lungs: Bibasilar crackles.  Abdomen: Soft nondistended nontender.  Extremities: No pedal edema.  Neurology: Grossly intact.  Skin: No breakdown.     Continue current treatment plan and supportive respiratory care and keep titrating oxygen to keep oxygen more than 92%.  Patient completed remdesivir and Decadron and is on adjunct treatment.  Continue nutritional support.  Continue incentive spirometry and Aerobika.  Continue DVT and stress ulcer present pain anxiety control.  Repeat labs from time to time.  Patient will need outpatient follow-up for sleep apnea and will need to get BiPAP set up at home.  He had a BiPAP or CPAP at home which he did not use consistently.  Repeat labs and imaging studies from time to time.  Continue respiratory and contact  isolation's.. As he has steady improvement and doing well from the pulmonary standpoint I will sign off from the patient's care but I would recommend to get home oxygen evaluation prior to the discharge and set up BiPAP at home.  He will need a pulmonary clinic follow-up in a month after discharge with a chest x-ray and outpatient sleep study if possible.  We appreciate the consult and like to thank Dr. Hanson for the referral.     I have seen and examined patient personally, performing a face-to-face diagnostic evaluation with plan of care reviewed and developed with APRN and nursing staff. I have addended and/or modified the above history of present illness, physical examination, and assessment and plan to reflect my findings and impressions. Essential elements of the care plan were discussed with APRN above.  Agree with findings and assessment/plan as documented above.    Cora Ewing MD  Pulmonologist/Intensivist  2/19/2021 19:18 CST

## 2021-02-19 NOTE — PROGRESS NOTES
YENNY Dodson APRN        Internal Medicine Progress Note    2/19/2021   14:58 CST    Name:  Santiago Spence  MRN:    [id number]     Acct:     [id number]   Room:  [facility] Day: 0     Admit Date: 2/16/2021  5:28 PM  PCP: Meredith Lloyd APRN    Subjective:     C/C: weakness, shortness of breath    Interval History: Status:  Improved. Up to chair. No family at bedside. Independent for ADLs. Tolerating oxygen weaning. Denies pain. Anxious to progress and prepare for discharge.     Review of Systems   Constitution: Negative for chills, decreased appetite, malaise/fatigue, weight gain and weight loss.   HENT: Negative for congestion, ear discharge, hoarse voice and tinnitus.    Eyes: Negative for blurred vision, discharge, visual disturbance and visual halos.   Cardiovascular: Negative for chest pain, claudication, dyspnea on exertion, irregular heartbeat, leg swelling, orthopnea and paroxysmal nocturnal dyspnea.   Respiratory: Negative for cough, shortness of breath, sputum production and wheezing.    Endocrine: Negative for cold intolerance, heat intolerance and polyuria.   Hematologic/Lymphatic: Negative for adenopathy. Does not bruise/bleed easily.   Skin: Negative for dry skin, itching and suspicious lesions.   Musculoskeletal: Negative for arthritis, back pain, falls, joint pain, muscle weakness and myalgias.   Gastrointestinal: Negative for abdominal pain, constipation, diarrhea, dysphagia and hematemesis.   Genitourinary: Negative for bladder incontinence, dysuria and frequency.   Neurological: Negative for aphonia, disturbances in coordination, dizziness and weakness.   Psychiatric/Behavioral: Negative for altered mental status, depression, memory loss and substance abuse. The patient does not have insomnia and is not nervous/anxious.          Medications:     Allergies: No Known Allergies    Current Meds:   Current Facility-Administered Medications:   •   acetaminophen (TYLENOL) tablet 650 mg, 650 mg, Oral, Q4H PRN **OR** acetaminophen (TYLENOL) suppository 650 mg, 650 mg, Rectal, Q4H PRN, Kojo Hanson MD  •  albuterol sulfate HFA (PROVENTIL HFA;VENTOLIN HFA;PROAIR HFA) inhaler 2 puff, 2 puff, Inhalation, 4x Daily - RT, Kojo Hanson MD  •  amLODIPine (NORVASC) tablet 5 mg, 5 mg, Oral, Q24H, Kojo Hanson MD  •  ascorbic acid (VITAMIN C) tablet 500 mg, 500 mg, Oral, BID, Kojo Hanson MD  •  aspirin chewable tablet 81 mg, 81 mg, Oral, Daily, Kojo Hanson MD  •  atorvastatin (LIPITOR) tablet 10 mg, 10 mg, Oral, Nightly, Kojo Hanson MD  •  benzonatate (TESSALON) capsule 200 mg, 200 mg, Oral, Q4H PRN, Kojo Hanson MD  •  cholecalciferol (VITAMIN D3) tablet 1,000 Units, 1,000 Units, Oral, Daily, Kojo Hanson MD  •  colchicine tablet 0.6 mg, 0.6 mg, Oral, Daily, Kojo Hanson MD  •  dextromethorphan polistirex ER (DELSYM) 30 MG/5ML oral suspension 60 mg, 60 mg, Oral, Q12H PRN, Kojo Hanson MD  •  docusate sodium (COLACE) capsule 100 mg, 100 mg, Oral, Daily, Kojo Hanson MD  •  enoxaparin (LOVENOX) syringe 40 mg, 40 mg, Subcutaneous, Q12H, Kojo Hanson MD  •  famotidine (PEPCID) tablet 20 mg, 20 mg, Oral, BID AC, Kojo Hanson MD  •  hydroCHLOROthiazide (HYDRODIURIL) tablet 25 mg, 25 mg, Oral, Daily, Kojo Hanson MD  •  labetalol (NORMODYNE,TRANDATE) injection 10 mg, 10 mg, Intravenous, Q4H PRN, Kojo Hanson MD  •  melatonin tablet 3 mg, 3 mg, Oral, Nightly, Kojo Hanson MD  •  multivitamin and minerals liquid 15 mL, 15 mL, Oral, Daily, Kojo Hanson MD  •  ondansetron (ZOFRAN) tablet 4 mg, 4 mg, Oral, Q6H PRN **OR** ondansetron (ZOFRAN) injection 4 mg, 4 mg, Intravenous, Q6H PRN, Kojo Hanson MD  •  polyethylene glycol (MIRALAX) packet 17 g, 17 g, Oral, Daily, Kojo Hanson MD  •  sodium  "chloride 0.9 % flush 10 mL, 10 mL, Intravenous, Q12H, Kojo Hanson MD  •  sodium chloride 0.9 % flush 10 mL, 10 mL, Intravenous, PRN, Kojo Hanson MD  •  sodium chloride nasal spray 1 spray, 1 spray, Each Nare, PRN, Kojo Hanson MD  •  verapamil SR (CALAN-SR) CR tablet 180 mg, 180 mg, Oral, Q24H, Kojo Hanson MD  •  Shaunna's amazing butt cream, , Topical, TID, Abbey Haddad APRN  •  Shaunna's amazing butt cream, , Topical, PRN, Abbey Haddad APRN  •  zinc sulfate (ZINCATE) capsule 220 mg, 220 mg, Oral, Daily, Kojo Hanson MD    Data:     Code Status:    There are no questions and answers to display.       Family History   Problem Relation Age of Onset   • Hypertension Mother    • Coronary artery disease Father        Social History     Socioeconomic History   • Marital status:      Spouse name: Not on file   • Number of children: Not on file   • Years of education: Not on file   • Highest education level: Not on file   Tobacco Use   • Smoking status: Never Smoker   • Smokeless tobacco: Former User       Vitals:  Ht 162.6 cm (64\")   Wt 92.1 kg (203 lb)   BMI 34.84 kg/m²   T 97.9 P 84 R 10 /62 Sp02 93% (3 lpm)          I/O (24Hr):  No intake or output data in the 24 hours ending 02/19/21 1458    Labs and imaging:      No results found for this or any previous visit (from the past 12 hour(s)).        Physical Examination:        Physical Exam  Vitals signs and nursing note reviewed.   Constitutional:       Appearance: He is well-developed.   HENT:      Head: Normocephalic and atraumatic.      Nose: Nose normal.   Eyes:      Pupils: Pupils are equal, round, and reactive to light.   Neck:      Musculoskeletal: Normal range of motion and neck supple.   Cardiovascular:      Rate and Rhythm: Normal rate and regular rhythm.      Heart sounds: Normal heart sounds.   Pulmonary:      Effort: Pulmonary effort is normal.      Breath sounds: " Normal breath sounds.   Abdominal:      General: Bowel sounds are normal.      Palpations: Abdomen is soft.   Musculoskeletal: Normal range of motion.   Skin:     General: Skin is warm and dry.   Neurological:      Mental Status: He is alert and oriented to person, place, and time.      Deep Tendon Reflexes: Reflexes are normal and symmetric.   Psychiatric:         Behavior: Behavior normal.           Assessment:            * No active hospital problems. *    Past Medical History:   Diagnosis Date   • Arthritis    • Cancer (CMS/HCC)    • Colon cancer (CMS/HCC)    • Hypertension    • Skin cancer         Plan:        1. Acute hypoxic respiratory failure  2. COVID-19 pneumonia  3. HTN  4. GERD    Continue current treatment. Monitor counts. Increase activity. Labs Monday. Continue oxygen weaning as tolerated.       Electronically signed by VALERIA Peña on 2/19/2021 at 14:58 CST       I have discussed the care of Santiago Spence, including pertinent history and exam findings, with the nurse practitioner.    I have seen and examined the patient and the key elements of all parts of the encounter have been performed by me.  I agree with the assessment, plan and orders as documented by VALERIA Leyva, after I modified the exam findings and the plan of treatments and the final version is my approved version of the assessment.        Electronically signed by Kojo Hanson MD on 2/19/2021 at 16:29 CST

## 2021-02-20 PROCEDURE — 25010000002 ENOXAPARIN PER 10 MG: Performed by: INTERNAL MEDICINE

## 2021-02-20 NOTE — PROGRESS NOTES
YENNY Dodson APRN        Internal Medicine Progress Note    2/20/2021   17:40 CST    Name:  Santiago Spence  MRN:    [id number]     Acct:     [id number]   Room:  [facility] Day: 0     Admit Date: 2/16/2021  5:28 PM  PCP: Meredith Lloyd APRN    Subjective:     C/C: weakness, shortness of breath     Interval History: Status:  Improved. Up to chair. No family at bedside. Independent for ADLs. Tolerating oxygen weaning. Denies pain. Anxious to progress and prepare for discharge.     Review of Systems   Constitution: Negative for chills, decreased appetite, malaise/fatigue, weight gain and weight loss.   HENT: Negative for congestion, ear discharge, hoarse voice and tinnitus.    Eyes: Negative for blurred vision, discharge, visual disturbance and visual halos.   Cardiovascular: Negative for chest pain, claudication, dyspnea on exertion, irregular heartbeat, leg swelling, orthopnea and paroxysmal nocturnal dyspnea.   Respiratory: Negative for cough, shortness of breath, sputum production and wheezing.    Endocrine: Negative for cold intolerance, heat intolerance and polyuria.   Hematologic/Lymphatic: Negative for adenopathy. Does not bruise/bleed easily.   Skin: Negative for dry skin, itching and suspicious lesions.   Musculoskeletal: Negative for arthritis, back pain, falls, joint pain, muscle weakness and myalgias.   Gastrointestinal: Negative for abdominal pain, constipation, diarrhea, dysphagia and hematemesis.   Genitourinary: Negative for bladder incontinence, dysuria and frequency.   Neurological: Negative for aphonia, disturbances in coordination, dizziness and weakness.   Psychiatric/Behavioral: Negative for altered mental status, depression, memory loss and substance abuse. The patient does not have insomnia and is not nervous/anxious.          Medications:     Allergies: No Known Allergies    Current Meds:   Current Facility-Administered Medications:   •   acetaminophen (TYLENOL) tablet 650 mg, 650 mg, Oral, Q4H PRN **OR** acetaminophen (TYLENOL) suppository 650 mg, 650 mg, Rectal, Q4H PRN, Kojo Hanson MD  •  albuterol sulfate HFA (PROVENTIL HFA;VENTOLIN HFA;PROAIR HFA) inhaler 2 puff, 2 puff, Inhalation, 4x Daily - RT, Kojo Hanson MD  •  amLODIPine (NORVASC) tablet 5 mg, 5 mg, Oral, Q24H, Kojo Hanson MD  •  ascorbic acid (VITAMIN C) tablet 500 mg, 500 mg, Oral, BID, Kojo Hanson MD  •  aspirin chewable tablet 81 mg, 81 mg, Oral, Daily, Kojo Hanson MD  •  atorvastatin (LIPITOR) tablet 10 mg, 10 mg, Oral, Nightly, Kojo Hanson MD  •  benzonatate (TESSALON) capsule 200 mg, 200 mg, Oral, Q4H PRN, Kojo Hanson MD  •  cholecalciferol (VITAMIN D3) tablet 1,000 Units, 1,000 Units, Oral, Daily, Kojo Hanson MD  •  colchicine tablet 0.6 mg, 0.6 mg, Oral, Daily, Kojo Hanson MD  •  dextromethorphan polistirex ER (DELSYM) 30 MG/5ML oral suspension 60 mg, 60 mg, Oral, Q12H PRN, Kojo Hanson MD  •  docusate sodium (COLACE) capsule 100 mg, 100 mg, Oral, Daily, Kojo Hanson MD  •  enoxaparin (LOVENOX) syringe 40 mg, 40 mg, Subcutaneous, Q12H, Kojo Hanson MD  •  famotidine (PEPCID) tablet 20 mg, 20 mg, Oral, BID AC, Kojo Hanson MD  •  hydroCHLOROthiazide (HYDRODIURIL) tablet 25 mg, 25 mg, Oral, Daily, Kojo Hanson MD  •  labetalol (NORMODYNE,TRANDATE) injection 10 mg, 10 mg, Intravenous, Q4H PRN, Kojo Hanson MD  •  melatonin tablet 3 mg, 3 mg, Oral, Nightly, Kojo Hanson MD  •  multivitamin and minerals liquid 15 mL, 15 mL, Oral, Daily, Kojo Hanson MD  •  ondansetron (ZOFRAN) tablet 4 mg, 4 mg, Oral, Q6H PRN **OR** ondansetron (ZOFRAN) injection 4 mg, 4 mg, Intravenous, Q6H PRN, Kojo Hanson MD  •  polyethylene glycol (MIRALAX) packet 17 g, 17 g, Oral, Daily, Kojo Hanson MD  •  sodium  "chloride 0.9 % flush 10 mL, 10 mL, Intravenous, Q12H, Kojo Hanson MD  •  sodium chloride 0.9 % flush 10 mL, 10 mL, Intravenous, PRN, Kojo Hanson MD  •  sodium chloride nasal spray 1 spray, 1 spray, Each Nare, PRN, Kojo Hanson MD  •  verapamil SR (CALAN-SR) CR tablet 180 mg, 180 mg, Oral, Q24H, Kojo Hanson MD  •  Shaunna's amazing butt cream, , Topical, TID, Abbey Haddad APRN  •  Shaunna's amazing butt cream, , Topical, PRN, Abbey Haddad APRN  •  zinc sulfate (ZINCATE) capsule 220 mg, 220 mg, Oral, Daily, Kojo Hanson MD    Data:     Code Status:    There are no questions and answers to display.       Family History   Problem Relation Age of Onset   • Hypertension Mother    • Coronary artery disease Father        Social History     Socioeconomic History   • Marital status:      Spouse name: Not on file   • Number of children: Not on file   • Years of education: Not on file   • Highest education level: Not on file   Tobacco Use   • Smoking status: Never Smoker   • Smokeless tobacco: Former User       Vitals:  Ht 162.6 cm (64\")   Wt 92.1 kg (203 lb)   BMI 34.84 kg/m²   T 98 P 78 R 18 P 106/45 Sp02 95% (1 lpm)          I/O (24Hr):  No intake or output data in the 24 hours ending 02/20/21 1740    Labs and imaging:      No results found for this or any previous visit (from the past 12 hour(s)).        Physical Examination:        Physical Exam  Vitals signs and nursing note reviewed.   Constitutional:       Appearance: He is well-developed.   HENT:      Head: Normocephalic and atraumatic.      Nose: Nose normal.   Eyes:      Pupils: Pupils are equal, round, and reactive to light.   Neck:      Musculoskeletal: Normal range of motion and neck supple.   Cardiovascular:      Rate and Rhythm: Normal rate and regular rhythm.      Heart sounds: Normal heart sounds.   Pulmonary:      Effort: Pulmonary effort is normal.      Breath sounds: " Normal breath sounds.   Abdominal:      General: Bowel sounds are normal.      Palpations: Abdomen is soft.   Musculoskeletal: Normal range of motion.   Skin:     General: Skin is warm and dry.   Neurological:      Mental Status: He is alert and oriented to person, place, and time.      Deep Tendon Reflexes: Reflexes are normal and symmetric.   Psychiatric:         Behavior: Behavior normal.           Assessment:            * No active hospital problems. *    Past Medical History:   Diagnosis Date   • Arthritis    • Cancer (CMS/HCC)    • Colon cancer (CMS/HCC)    • Hypertension    • Skin cancer         Plan:        1. Acute hypoxic respiratory failure  2. COVID-19 pneumonia  3. HTN  4. GERD    Continue current treatment. Monitor counts. Increase activity. Labs Monday. Continue oxygen weaning as tolerated.       Electronically signed by VALERIA Reynolds on 2/20/2021 at 17:40 CST

## 2021-02-21 PROCEDURE — 97110 THERAPEUTIC EXERCISES: CPT

## 2021-02-21 PROCEDURE — 97116 GAIT TRAINING THERAPY: CPT

## 2021-02-21 PROCEDURE — 25010000002 ENOXAPARIN PER 10 MG: Performed by: INTERNAL MEDICINE

## 2021-02-21 NOTE — PROGRESS NOTES
YENNY Dodson APRN        Internal Medicine Progress Note    2/21/2021   17:01 CST    Name:  Santiago Spence  MRN:    [id number]     Acct:     [id number]   Room:  [address] [place] [address] Day: 0     Admit Date: 2/16/2021  5:28 PM  PCP: Meredith Lloyd APRN    Subjective:     C/C: weakness, shortness of breath     Interval History: Status:  Improved. Up to chair. No family at bedside. Independent for ADLs. Tolerating oxygen weaning. Patient does report desaturation and shortness of air with moderate exertion, upon entering room patient had been walking around room and using the bathroom.  Denies pain. Anxious to progress and prepare for discharge.     Review of Systems   Constitution: Negative for chills, decreased appetite, malaise/fatigue, weight gain and weight loss.   HENT: Negative for congestion, ear discharge, hoarse voice and tinnitus.    Eyes: Negative for blurred vision, discharge, visual disturbance and visual halos.   Cardiovascular: Negative for chest pain, claudication, dyspnea on exertion, irregular heartbeat, leg swelling, orthopnea and paroxysmal nocturnal dyspnea.   Respiratory: Positive for shortness of breath. Negative for cough, sputum production and wheezing.    Endocrine: Negative for cold intolerance, heat intolerance and polyuria.   Hematologic/Lymphatic: Negative for adenopathy. Does not bruise/bleed easily.   Skin: Negative for dry skin, itching and suspicious lesions.   Musculoskeletal: Negative for arthritis, back pain, falls, joint pain, muscle weakness and myalgias.   Gastrointestinal: Negative for abdominal pain, constipation, diarrhea, dysphagia and hematemesis.   Genitourinary: Negative for bladder incontinence, dysuria and frequency.   Neurological: Positive for weakness. Negative for aphonia, disturbances in coordination and dizziness.   Psychiatric/Behavioral: Negative for altered mental status, depression, memory loss and substance abuse. The  patient does not have insomnia and is not nervous/anxious.          Medications:     Allergies: No Known Allergies    Current Meds:   Current Facility-Administered Medications:   •  acetaminophen (TYLENOL) tablet 650 mg, 650 mg, Oral, Q4H PRN **OR** acetaminophen (TYLENOL) suppository 650 mg, 650 mg, Rectal, Q4H PRN, Kojo Hanson MD  •  albuterol sulfate HFA (PROVENTIL HFA;VENTOLIN HFA;PROAIR HFA) inhaler 2 puff, 2 puff, Inhalation, 4x Daily - RT, Kojo Hanson MD  •  amLODIPine (NORVASC) tablet 5 mg, 5 mg, Oral, Q24H, Kojo Hanson MD  •  ascorbic acid (VITAMIN C) tablet 500 mg, 500 mg, Oral, BID, Kojo Hanson MD  •  aspirin chewable tablet 81 mg, 81 mg, Oral, Daily, Kojo Hanson MD  •  atorvastatin (LIPITOR) tablet 10 mg, 10 mg, Oral, Nightly, Kojo Hanson MD  •  benzonatate (TESSALON) capsule 200 mg, 200 mg, Oral, Q4H PRN, Kojo Hanson MD  •  cholecalciferol (VITAMIN D3) tablet 1,000 Units, 1,000 Units, Oral, Daily, Kojo Hanson MD  •  colchicine tablet 0.6 mg, 0.6 mg, Oral, Daily, Kojo Hanson MD  •  dextromethorphan polistirex ER (DELSYM) 30 MG/5ML oral suspension 60 mg, 60 mg, Oral, Q12H PRN, Kojo Hanson MD  •  docusate sodium (COLACE) capsule 100 mg, 100 mg, Oral, Daily, Kojo Hanson MD  •  enoxaparin (LOVENOX) syringe 40 mg, 40 mg, Subcutaneous, Q12H, Kojo Hanson MD  •  famotidine (PEPCID) tablet 20 mg, 20 mg, Oral, BID AC, Kojo Hanson MD  •  hydroCHLOROthiazide (HYDRODIURIL) tablet 25 mg, 25 mg, Oral, Daily, Kojo Hanson MD  •  labetalol (NORMODYNE,TRANDATE) injection 10 mg, 10 mg, Intravenous, Q4H PRN, Kojo Hanson MD  •  melatonin tablet 3 mg, 3 mg, Oral, Nightly, Kojo Hanson MD  •  multivitamin and minerals liquid 15 mL, 15 mL, Oral, Daily, Kojo Hanson MD  •  ondansetron (ZOFRAN) tablet 4 mg, 4 mg, Oral, Q6H PRN **OR** ondansetron  "(ZOFRAN) injection 4 mg, 4 mg, Intravenous, Q6H PRN, Kojo Hanson MD  •  polyethylene glycol (MIRALAX) packet 17 g, 17 g, Oral, Daily, Kojo Hanson MD  •  sodium chloride 0.9 % flush 10 mL, 10 mL, Intravenous, Q12H, Kojo Hanson MD  •  sodium chloride 0.9 % flush 10 mL, 10 mL, Intravenous, PRN, Kojo Hanson MD  •  sodium chloride nasal spray 1 spray, 1 spray, Each Nare, PRN, Kojo Hanson MD  •  verapamil SR (CALAN-SR) CR tablet 180 mg, 180 mg, Oral, Q24H, Kojo Hanson MD  •  Shaunna's amazing butt cream, , Topical, TID, Abbey Haddad APRN  •  Shaunna's amazing butt cream, , Topical, PRN, Abbey Haddad APRN  •  zinc sulfate (ZINCATE) capsule 220 mg, 220 mg, Oral, Daily, Kojo Hanson MD    Data:     Code Status:    There are no questions and answers to display.       Family History   Problem Relation Age of Onset   • Hypertension Mother    • Coronary artery disease Father        Social History     Socioeconomic History   • Marital status:      Spouse name: Not on file   • Number of children: Not on file   • Years of education: Not on file   • Highest education level: Not on file   Tobacco Use   • Smoking status: Never Smoker   • Smokeless tobacco: Former User       Vitals:  Ht 162.6 cm (64\")   Wt 92.1 kg (203 lb)   BMI 34.84 kg/m²   T 96.9 P 92 R 16 P 129/83 Sp02 93% (1 lpm)          I/O (24Hr):  No intake or output data in the 24 hours ending 02/21/21 1701    Labs and imaging:      No results found for this or any previous visit (from the past 12 hour(s)).        Physical Examination:        Physical Exam  Vitals signs and nursing note reviewed.   Constitutional:       Appearance: He is well-developed.   HENT:      Head: Normocephalic and atraumatic.      Nose: Nose normal.      Comments: Patient reports bilateral dryness to nares, has had a few episodes of epistaxis easily controlled.  Ocean spray at bedside, 02 to " humidity     Mouth/Throat:      Mouth: Mucous membranes are dry.   Eyes:      Pupils: Pupils are equal, round, and reactive to light.   Neck:      Musculoskeletal: Normal range of motion and neck supple.   Cardiovascular:      Rate and Rhythm: Normal rate and regular rhythm.      Heart sounds: Normal heart sounds.   Pulmonary:      Effort: Pulmonary effort is normal.      Comments: Diminished breath sounds throughout  Abdominal:      General: Bowel sounds are normal.      Palpations: Abdomen is soft.   Musculoskeletal: Normal range of motion.   Skin:     General: Skin is warm and dry.   Neurological:      Mental Status: He is alert and oriented to person, place, and time.      Deep Tendon Reflexes: Reflexes are normal and symmetric.   Psychiatric:         Behavior: Behavior normal.           Assessment:            * No active hospital problems. *    Past Medical History:   Diagnosis Date   • Arthritis    • Cancer (CMS/HCC)    • Colon cancer (CMS/HCC)    • Hypertension    • Skin cancer         Plan:        1. Acute hypoxic respiratory failure  2. COVID-19 pneumonia  3. HTN  4. GERD    Continue current treatment. Monitor counts. Order to release patient from covid quarantine (documented 2/1/2021 positive covid result).  Increase activity. Aggressive therapy.  Labs Monday. Continue oxygen weaning as tolerated.       Electronically signed by VALERIA Reynolds on 2/21/2021 at 17:01 CST     I have discussed the care of Santiago Spence, including pertinent history and exam findings, with the nurse practitioner.    I have seen and examined the patient and the key elements of all parts of the encounter have been performed by me.  I agree with the assessment, plan and orders as documented by VALERIA Reynolds, after I modified the exam findings and the plan of treatments and the final version is my approved version of the assessment.        Electronically signed by Kojo Hanson MD on 2/21/2021 at 19:43 CST

## 2021-02-22 VITALS — WEIGHT: 210 LBS | BODY MASS INDEX: 35.85 KG/M2 | HEIGHT: 64 IN

## 2021-02-22 PROCEDURE — 97116 GAIT TRAINING THERAPY: CPT

## 2021-02-22 PROCEDURE — 25010000002 ENOXAPARIN PER 10 MG: Performed by: INTERNAL MEDICINE

## 2021-02-22 PROCEDURE — 97535 SELF CARE MNGMENT TRAINING: CPT

## 2021-02-22 PROCEDURE — 97110 THERAPEUTIC EXERCISES: CPT

## 2021-02-22 RX ORDER — MULTIPLE VITAMINS W/ MINERALS TAB 9MG-400MCG
1 TAB ORAL DAILY
Status: DISCONTINUED | OUTPATIENT
Start: 2021-02-22 | End: 2021-02-26 | Stop reason: HOSPADM

## 2021-02-22 NOTE — PROGRESS NOTES
YENNY Dodson APRN        Internal Medicine Progress Note    2/22/2021   14:21 CST    Name:  Santiago Spence  MRN:    [id number]     Acct:     [id number]   Room:  [address] [place] [address] Day: 0     Admit Date: 2/16/2021  5:28 PM  PCP: Meredith Lloyd APRN    Subjective:     C/C: weakness, shortness of breath    Interval History: Status:  Improved. Up to chair. Wife at bedside. Independent for ADLs. Tolerating oxygen weaning. 02 sats stable with 02 at 2 lpm. Denies pain. Anxious to progress and prepare for discharge.     Review of Systems   Constitution: Negative for chills, decreased appetite, malaise/fatigue, weight gain and weight loss.   HENT: Negative for congestion, ear discharge, hoarse voice and tinnitus.    Eyes: Negative for blurred vision, discharge, visual disturbance and visual halos.   Cardiovascular: Negative for chest pain, claudication, dyspnea on exertion, irregular heartbeat, leg swelling, orthopnea and paroxysmal nocturnal dyspnea.   Respiratory: Negative for cough, shortness of breath, sputum production and wheezing.    Endocrine: Negative for cold intolerance, heat intolerance and polyuria.   Hematologic/Lymphatic: Negative for adenopathy. Does not bruise/bleed easily.   Skin: Negative for dry skin, itching and suspicious lesions.   Musculoskeletal: Negative for arthritis, back pain, falls, joint pain, muscle weakness and myalgias.   Gastrointestinal: Negative for abdominal pain, constipation, diarrhea, dysphagia and hematemesis.   Genitourinary: Negative for bladder incontinence, dysuria and frequency.   Neurological: Negative for aphonia, disturbances in coordination, dizziness and weakness.   Psychiatric/Behavioral: Negative for altered mental status, depression, memory loss and substance abuse. The patient does not have insomnia and is not nervous/anxious.          Medications:     Allergies: No Known Allergies    Current Meds:   Current  Facility-Administered Medications:   •  acetaminophen (TYLENOL) tablet 650 mg, 650 mg, Oral, Q4H PRN **OR** acetaminophen (TYLENOL) suppository 650 mg, 650 mg, Rectal, Q4H PRN, Kojo Hanson MD  •  albuterol sulfate HFA (PROVENTIL HFA;VENTOLIN HFA;PROAIR HFA) inhaler 2 puff, 2 puff, Inhalation, 4x Daily - RT, Kojo Hanson MD  •  amLODIPine (NORVASC) tablet 5 mg, 5 mg, Oral, Q24H, Kojo Hanson MD  •  ascorbic acid (VITAMIN C) tablet 500 mg, 500 mg, Oral, BID, Kojo Hanson MD  •  aspirin chewable tablet 81 mg, 81 mg, Oral, Daily, Kojo Hanson MD  •  atorvastatin (LIPITOR) tablet 10 mg, 10 mg, Oral, Nightly, Kojo Hanson MD  •  benzonatate (TESSALON) capsule 200 mg, 200 mg, Oral, Q4H PRN, Kojo Hanson MD  •  cholecalciferol (VITAMIN D3) tablet 1,000 Units, 1,000 Units, Oral, Daily, Kojo Hanson MD  •  colchicine tablet 0.6 mg, 0.6 mg, Oral, Daily, Kojo Hanson MD  •  dextromethorphan polistirex ER (DELSYM) 30 MG/5ML oral suspension 60 mg, 60 mg, Oral, Q12H PRN, Kojo Hanson MD  •  docusate sodium (COLACE) capsule 100 mg, 100 mg, Oral, Daily, Kojo Hanson MD  •  enoxaparin (LOVENOX) syringe 40 mg, 40 mg, Subcutaneous, Q12H, Kojo Hanson MD  •  famotidine (PEPCID) tablet 20 mg, 20 mg, Oral, BID AC, Kojo Hanson MD  •  hydroCHLOROthiazide (HYDRODIURIL) tablet 25 mg, 25 mg, Oral, Daily, Kojo Hanson MD  •  labetalol (NORMODYNE,TRANDATE) injection 10 mg, 10 mg, Intravenous, Q4H PRN, Kojo Hanson MD  •  melatonin tablet 3 mg, 3 mg, Oral, Nightly, Kojo Hanson MD  •  multivitamin with minerals 1 tablet, 1 tablet, Oral, Daily, Kojo Hanson MD  •  ondansetron (ZOFRAN) tablet 4 mg, 4 mg, Oral, Q6H PRN **OR** ondansetron (ZOFRAN) injection 4 mg, 4 mg, Intravenous, Q6H PRN, Kojo Hanson MD  •  polyethylene glycol (MIRALAX) packet 17 g, 17 g, Oral, Daily,  "Kojo Hanson MD  •  sodium chloride 0.9 % flush 10 mL, 10 mL, Intravenous, Q12H, Kojo Hanson MD  •  sodium chloride 0.9 % flush 10 mL, 10 mL, Intravenous, PRN, Kojo Hanson MD  •  sodium chloride nasal spray 1 spray, 1 spray, Each Nare, PRN, Kojo Hanson MD  •  verapamil SR (CALAN-SR) CR tablet 180 mg, 180 mg, Oral, Q24H, Kojo Hanson MD  •  Shaunna's amazing butt cream, , Topical, TID, Abbey Haddad APRN  •  Shaunna's amazing butt cream, , Topical, PRN, Abbey Haddad APRN  •  zinc sulfate (ZINCATE) capsule 220 mg, 220 mg, Oral, Daily, Kojo Hanson MD    Data:     Code Status:    There are no questions and answers to display.       Family History   Problem Relation Age of Onset   • Hypertension Mother    • Coronary artery disease Father        Social History     Socioeconomic History   • Marital status:      Spouse name: Not on file   • Number of children: Not on file   • Years of education: Not on file   • Highest education level: Not on file   Tobacco Use   • Smoking status: Never Smoker   • Smokeless tobacco: Former User       Vitals:  Ht 162.6 cm (64\")   Wt 95.3 kg (210 lb)   BMI 36.05 kg/m²   T 97.3 P 84 R 18 /54 Sp02 93% (2 lpm)          I/O (24Hr):  No intake or output data in the 24 hours ending 02/22/21 1421    Labs and imaging:      No results found for this or any previous visit (from the past 12 hour(s)).        Physical Examination:        Physical Exam  Vitals signs and nursing note reviewed.   Constitutional:       Appearance: He is well-developed.   HENT:      Head: Normocephalic and atraumatic.      Nose: Nose normal.   Eyes:      Pupils: Pupils are equal, round, and reactive to light.   Neck:      Musculoskeletal: Normal range of motion and neck supple.   Cardiovascular:      Rate and Rhythm: Normal rate and regular rhythm.      Heart sounds: Normal heart sounds.   Pulmonary:      Effort: Pulmonary " effort is normal.      Breath sounds: Normal breath sounds.   Abdominal:      General: Bowel sounds are normal.      Palpations: Abdomen is soft.   Musculoskeletal: Normal range of motion.   Skin:     General: Skin is warm and dry.   Neurological:      Mental Status: He is alert and oriented to person, place, and time.      Deep Tendon Reflexes: Reflexes are normal and symmetric.   Psychiatric:         Behavior: Behavior normal.           Assessment:            * No active hospital problems. *    Past Medical History:   Diagnosis Date   • Arthritis    • Cancer (CMS/HCC)    • Colon cancer (CMS/HCC)    • Hypertension    • Skin cancer         Plan:        1. Acute hypoxic respiratory failure  2. COVID-19 pneumonia  3. HTN  4. GERD    Continue current treatment. Monitor counts. Increase activity. Labs Thursday. Continue oxygen weaning as tolerated. Discharge planning.       Electronically signed by VALERIA Peña on 2/22/2021 at 14:21 CST     I have discussed the care of Santiago Spence, including pertinent history and exam findings, with the nurse practitioner.    I have seen and examined the patient and the key elements of all parts of the encounter have been performed by me.  I agree with the assessment, plan and orders as documented by VALERIA Leyva, after I modified the exam findings and the plan of treatments and the final version is my approved version of the assessment.        Electronically signed by Kojo Hanson MD on 2/22/2021 at 22:48 CST

## 2021-02-23 PROCEDURE — 97116 GAIT TRAINING THERAPY: CPT

## 2021-02-23 PROCEDURE — 97110 THERAPEUTIC EXERCISES: CPT

## 2021-02-23 PROCEDURE — 25010000002 ENOXAPARIN PER 10 MG: Performed by: INTERNAL MEDICINE

## 2021-02-23 NOTE — PROGRESS NOTES
YENNY Dodson APRN        Internal Medicine Progress Note    2/23/2021   13:41 CST    Name:  Santiago Spence  MRN:    [id number]     Acct:     [id number]   Room:  [address] [place] [address] Day: 0     Admit Date: 2/16/2021  5:28 PM  PCP: Meredith Lloyd APRN    Subjective:     C/C: weakness, shortness of breath    Interval History: Status:  Improved. Up to chair. No family at bedside. Independent for ADLs. Tolerating oxygen weaning. 02 sats stable with 02 at 2 lpm. Denies pain. Anxious to progress and prepare for discharge.     Review of Systems   Constitution: Negative for chills, decreased appetite, malaise/fatigue, weight gain and weight loss.   HENT: Negative for congestion, ear discharge, hoarse voice and tinnitus.    Eyes: Negative for blurred vision, discharge, visual disturbance and visual halos.   Cardiovascular: Negative for chest pain, claudication, dyspnea on exertion, irregular heartbeat, leg swelling, orthopnea and paroxysmal nocturnal dyspnea.   Respiratory: Negative for cough, shortness of breath, sputum production and wheezing.    Endocrine: Negative for cold intolerance, heat intolerance and polyuria.   Hematologic/Lymphatic: Negative for adenopathy. Does not bruise/bleed easily.   Skin: Negative for dry skin, itching and suspicious lesions.   Musculoskeletal: Negative for arthritis, back pain, falls, joint pain, muscle weakness and myalgias.   Gastrointestinal: Negative for abdominal pain, constipation, diarrhea, dysphagia and hematemesis.   Genitourinary: Negative for bladder incontinence, dysuria and frequency.   Neurological: Negative for aphonia, disturbances in coordination, dizziness and weakness.   Psychiatric/Behavioral: Negative for altered mental status, depression, memory loss and substance abuse. The patient does not have insomnia and is not nervous/anxious.          Medications:     Allergies: No Known Allergies    Current Meds:   Current  Facility-Administered Medications:   •  acetaminophen (TYLENOL) tablet 650 mg, 650 mg, Oral, Q4H PRN **OR** acetaminophen (TYLENOL) suppository 650 mg, 650 mg, Rectal, Q4H PRN, Kojo Hanson MD  •  albuterol sulfate HFA (PROVENTIL HFA;VENTOLIN HFA;PROAIR HFA) inhaler 2 puff, 2 puff, Inhalation, 4x Daily - RT, Kojo Hanson MD  •  amLODIPine (NORVASC) tablet 5 mg, 5 mg, Oral, Q24H, Kojo Hanson MD  •  ascorbic acid (VITAMIN C) tablet 500 mg, 500 mg, Oral, BID, Kojo Hanson MD  •  aspirin chewable tablet 81 mg, 81 mg, Oral, Daily, Kojo Hanson MD  •  atorvastatin (LIPITOR) tablet 10 mg, 10 mg, Oral, Nightly, Kojo Hanson MD  •  benzonatate (TESSALON) capsule 200 mg, 200 mg, Oral, Q4H PRN, Kojo Hanson MD  •  cholecalciferol (VITAMIN D3) tablet 1,000 Units, 1,000 Units, Oral, Daily, Kojo Hanson MD  •  colchicine tablet 0.6 mg, 0.6 mg, Oral, Daily, Kojo Hanson MD  •  dextromethorphan polistirex ER (DELSYM) 30 MG/5ML oral suspension 60 mg, 60 mg, Oral, Q12H PRN, Kojo Hanson MD  •  docusate sodium (COLACE) capsule 100 mg, 100 mg, Oral, Daily, Kojo Hanson MD  •  enoxaparin (LOVENOX) syringe 40 mg, 40 mg, Subcutaneous, Q12H, Kojo Hanson MD  •  famotidine (PEPCID) tablet 20 mg, 20 mg, Oral, BID AC, Kojo Hanson MD  •  hydroCHLOROthiazide (HYDRODIURIL) tablet 25 mg, 25 mg, Oral, Daily, Kojo Hanson MD  •  labetalol (NORMODYNE,TRANDATE) injection 10 mg, 10 mg, Intravenous, Q4H PRN, Kojo Hanson MD  •  melatonin tablet 3 mg, 3 mg, Oral, Nightly, Kojo Hanson MD  •  multivitamin with minerals 1 tablet, 1 tablet, Oral, Daily, Kojo Hanson MD  •  ondansetron (ZOFRAN) tablet 4 mg, 4 mg, Oral, Q6H PRN **OR** ondansetron (ZOFRAN) injection 4 mg, 4 mg, Intravenous, Q6H PRN, Kojo Hanson MD  •  polyethylene glycol (MIRALAX) packet 17 g, 17 g, Oral, Daily,  "Kojo Hanson MD  •  sodium chloride 0.9 % flush 10 mL, 10 mL, Intravenous, Q12H, Kojo Hanson MD  •  sodium chloride 0.9 % flush 10 mL, 10 mL, Intravenous, PRN, Kojo Hanson MD  •  sodium chloride nasal spray 1 spray, 1 spray, Each Nare, PRN, Kojo Hanson MD  •  verapamil SR (CALAN-SR) CR tablet 180 mg, 180 mg, Oral, Q24H, Kojo Hanson MD  •  Shaunna's amazing butt cream, , Topical, TID, Abbey Haddad APRN  •  Shaunna's amazing butt cream, , Topical, PRN, Abbey Haddad APRN  •  zinc sulfate (ZINCATE) capsule 220 mg, 220 mg, Oral, Daily, Kojo Hanson MD    Data:     Code Status:    There are no questions and answers to display.       Family History   Problem Relation Age of Onset   • Hypertension Mother    • Coronary artery disease Father        Social History     Socioeconomic History   • Marital status:      Spouse name: Not on file   • Number of children: Not on file   • Years of education: Not on file   • Highest education level: Not on file   Tobacco Use   • Smoking status: Never Smoker   • Smokeless tobacco: Former User       Vitals:  Ht 162.6 cm (64\")   Wt 95.3 kg (210 lb)   BMI 36.05 kg/m²   T 98.3 P 77 R 18 /67 Sp02 96% (2 lpm)          I/O (24Hr):  No intake or output data in the 24 hours ending 02/23/21 1341    Labs and imaging:      No results found for this or any previous visit (from the past 12 hour(s)).        Physical Examination:        Physical Exam  Vitals signs and nursing note reviewed.   Constitutional:       Appearance: He is well-developed.   HENT:      Head: Normocephalic and atraumatic.      Nose: Nose normal.   Eyes:      Pupils: Pupils are equal, round, and reactive to light.   Neck:      Musculoskeletal: Normal range of motion and neck supple.   Cardiovascular:      Rate and Rhythm: Normal rate and regular rhythm.      Heart sounds: Normal heart sounds.   Pulmonary:      Effort: Pulmonary " effort is normal.      Breath sounds: Normal breath sounds.   Abdominal:      General: Bowel sounds are normal.      Palpations: Abdomen is soft.   Musculoskeletal: Normal range of motion.   Skin:     General: Skin is warm and dry.   Neurological:      Mental Status: He is alert and oriented to person, place, and time.      Deep Tendon Reflexes: Reflexes are normal and symmetric.   Psychiatric:         Behavior: Behavior normal.           Assessment:            * No active hospital problems. *    Past Medical History:   Diagnosis Date   • Arthritis    • Cancer (CMS/HCC)    • Colon cancer (CMS/HCC)    • Hypertension    • Skin cancer         Plan:        1. Acute hypoxic respiratory failure  2. COVID-19 pneumonia  3. HTN  4. GERD    Continue current treatment. Monitor counts. Increase activity. Labs Thursday. Continue oxygen weaning as tolerated. Discharge planning.       Electronically signed by VALERIA Peña on 2/23/2021 at 13:41 CST

## 2021-02-24 PROCEDURE — 97530 THERAPEUTIC ACTIVITIES: CPT

## 2021-02-24 PROCEDURE — 25010000002 ENOXAPARIN PER 10 MG: Performed by: INTERNAL MEDICINE

## 2021-02-24 PROCEDURE — 97116 GAIT TRAINING THERAPY: CPT

## 2021-02-24 NOTE — PROGRESS NOTES
YENNY Dodson APRN        Internal Medicine Progress Note    2/24/2021   10:34 CST    Name:  Santiago Spence  MRN:    [id number]     Acct:     [id number]   Room:  [address] [place] [address] Day: 0     Admit Date: 2/16/2021  5:28 PM  PCP: Meredith Lloyd APRN    Subjective:     C/C: weakness, shortness of breath    Interval History: Status:  Improved. Up to chair. No family at bedside. Independent for ADLs. Tolerating oxygen weaning. 02 sats stable with 02 at 2 lpm. Denies pain. Anxious to progress and prepare for discharge.     Review of Systems   Constitution: Negative for chills, decreased appetite, malaise/fatigue, weight gain and weight loss.   HENT: Negative for congestion, ear discharge, hoarse voice and tinnitus.    Eyes: Negative for blurred vision, discharge, visual disturbance and visual halos.   Cardiovascular: Negative for chest pain, claudication, dyspnea on exertion, irregular heartbeat, leg swelling, orthopnea and paroxysmal nocturnal dyspnea.   Respiratory: Negative for cough, shortness of breath, sputum production and wheezing.    Endocrine: Negative for cold intolerance, heat intolerance and polyuria.   Hematologic/Lymphatic: Negative for adenopathy. Does not bruise/bleed easily.   Skin: Negative for dry skin, itching and suspicious lesions.   Musculoskeletal: Negative for arthritis, back pain, falls, joint pain, muscle weakness and myalgias.   Gastrointestinal: Negative for abdominal pain, constipation, diarrhea, dysphagia and hematemesis.   Genitourinary: Negative for bladder incontinence, dysuria and frequency.   Neurological: Negative for aphonia, disturbances in coordination, dizziness and weakness.   Psychiatric/Behavioral: Negative for altered mental status, depression, memory loss and substance abuse. The patient does not have insomnia and is not nervous/anxious.          Medications:     Allergies: No Known Allergies    Current Meds:   Current  Facility-Administered Medications:   •  acetaminophen (TYLENOL) tablet 650 mg, 650 mg, Oral, Q4H PRN **OR** acetaminophen (TYLENOL) suppository 650 mg, 650 mg, Rectal, Q4H PRN, Kojo Hanson MD  •  albuterol sulfate HFA (PROVENTIL HFA;VENTOLIN HFA;PROAIR HFA) inhaler 2 puff, 2 puff, Inhalation, 4x Daily - RT, Kojo Hanson MD  •  amLODIPine (NORVASC) tablet 5 mg, 5 mg, Oral, Q24H, Kojo Hanson MD  •  ascorbic acid (VITAMIN C) tablet 500 mg, 500 mg, Oral, BID, Kojo Hanson MD  •  aspirin chewable tablet 81 mg, 81 mg, Oral, Daily, Kojo Hanson MD  •  atorvastatin (LIPITOR) tablet 10 mg, 10 mg, Oral, Nightly, Kojo Hanson MD  •  benzonatate (TESSALON) capsule 200 mg, 200 mg, Oral, Q4H PRN, Kojo Hanson MD  •  cholecalciferol (VITAMIN D3) tablet 1,000 Units, 1,000 Units, Oral, Daily, Kojo Hanson MD  •  colchicine tablet 0.6 mg, 0.6 mg, Oral, Daily, Kojo Hanson MD  •  dextromethorphan polistirex ER (DELSYM) 30 MG/5ML oral suspension 60 mg, 60 mg, Oral, Q12H PRN, Kojo Hanson MD  •  docusate sodium (COLACE) capsule 100 mg, 100 mg, Oral, Daily, Kojo Hanson MD  •  enoxaparin (LOVENOX) syringe 40 mg, 40 mg, Subcutaneous, Q12H, Kojo Hanson MD  •  famotidine (PEPCID) tablet 20 mg, 20 mg, Oral, BID AC, Kojo Hanson MD  •  hydroCHLOROthiazide (HYDRODIURIL) tablet 25 mg, 25 mg, Oral, Daily, Kojo Hanson MD  •  labetalol (NORMODYNE,TRANDATE) injection 10 mg, 10 mg, Intravenous, Q4H PRN, Kojo Hanson MD  •  melatonin tablet 3 mg, 3 mg, Oral, Nightly, Kojo Hanson MD  •  multivitamin with minerals 1 tablet, 1 tablet, Oral, Daily, Kojo Hanson MD  •  ondansetron (ZOFRAN) tablet 4 mg, 4 mg, Oral, Q6H PRN **OR** ondansetron (ZOFRAN) injection 4 mg, 4 mg, Intravenous, Q6H PRN, Kojo Hanson MD  •  polyethylene glycol (MIRALAX) packet 17 g, 17 g, Oral, Daily,  "Kojo Hanson MD  •  sodium chloride 0.9 % flush 10 mL, 10 mL, Intravenous, Q12H, Kojo Hanson MD  •  sodium chloride 0.9 % flush 10 mL, 10 mL, Intravenous, PRN, Kojo Hanson MD  •  sodium chloride nasal spray 1 spray, 1 spray, Each Nare, PRN, Kojo Hanson MD  •  verapamil SR (CALAN-SR) CR tablet 180 mg, 180 mg, Oral, Q24H, Kojo Hanson MD  •  Shaunna's amazing butt cream, , Topical, TID, Abbey Haddad APRN  •  Shaunna's amazing butt cream, , Topical, PRN, Abbey Haddad APRN  •  zinc sulfate (ZINCATE) capsule 220 mg, 220 mg, Oral, Daily, Kojo Hanson MD    Data:     Code Status:    There are no questions and answers to display.       Family History   Problem Relation Age of Onset   • Hypertension Mother    • Coronary artery disease Father        Social History     Socioeconomic History   • Marital status:      Spouse name: Not on file   • Number of children: Not on file   • Years of education: Not on file   • Highest education level: Not on file   Tobacco Use   • Smoking status: Never Smoker   • Smokeless tobacco: Former User       Vitals:  Ht 162.6 cm (64\")   Wt 95.3 kg (210 lb)   BMI 36.05 kg/m²   T 98.0 P 88 R 22 /71 Sp02 94% (2 lpm)          I/O (24Hr):  No intake or output data in the 24 hours ending 02/24/21 1034    Labs and imaging:      No results found for this or any previous visit (from the past 12 hour(s)).        Physical Examination:        Physical Exam  Vitals signs and nursing note reviewed.   Constitutional:       Appearance: He is well-developed.   HENT:      Head: Normocephalic and atraumatic.      Nose: Nose normal.   Eyes:      Pupils: Pupils are equal, round, and reactive to light.   Neck:      Musculoskeletal: Normal range of motion and neck supple.   Cardiovascular:      Rate and Rhythm: Normal rate and regular rhythm.      Heart sounds: Normal heart sounds.   Pulmonary:      Effort: Pulmonary " effort is normal.      Breath sounds: Normal breath sounds.   Abdominal:      General: Bowel sounds are normal.      Palpations: Abdomen is soft.   Musculoskeletal: Normal range of motion.   Skin:     General: Skin is warm and dry.   Neurological:      Mental Status: He is alert and oriented to person, place, and time.      Deep Tendon Reflexes: Reflexes are normal and symmetric.   Psychiatric:         Behavior: Behavior normal.           Assessment:            * No active hospital problems. *    Past Medical History:   Diagnosis Date   • Arthritis    • Cancer (CMS/HCC)    • Colon cancer (CMS/HCC)    • Hypertension    • Skin cancer         Plan:        1. Acute hypoxic respiratory failure  2. COVID-19 pneumonia  3. HTN  4. GERD    Continue current treatment. Monitor counts. Increase activity. Labs in am. Continue oxygen weaning as tolerated. Discharge planning for discharge to home Friday.        Electronically signed by VALERIA Peña on 2/24/2021 at 10:34 CST     I have discussed the care of Santiago Spence, including pertinent history and exam findings, with the nurse practitioner.    I have seen and examined the patient and the key elements of all parts of the encounter have been performed by me.  I agree with the assessment, plan and orders as documented by VALERIA Leyva, after I modified the exam findings and the plan of treatments and the final version is my approved version of the assessment.        Electronically signed by Kojo Hanson MD on 2/24/2021 at 11:45 CST

## 2021-02-24 NOTE — PROGRESS NOTES
Adult Nutrition  Assessment/PES    Patient Name:  Santiago Spence  YOB: 1950  MRN: [id number]  Admit Date:  2/16/2021    Assessment Date:  2/24/2021    Comments:  PO intake remains appropriate, 100% x6 meals. No longer to be in isolation. No nutrition concerns at this time.     Reason for Assessment     Row Name 02/24/21 1146          Reason for Assessment    Reason For Assessment  follow-up protocol         Nutrition/Diet History     Row Name 02/24/21 1146          Nutrition/Diet History    Typical Food/Fluid Intake  Pt continues with a good appetite. To be moved from isolation. No nutrition concerns at this time.         Anthropometrics     Row Name 02/24/21 1147          Usual Body Weight (UBW)    Weight Loss Time Frame  current wt 204#        Body Mass Index (BMI)    BMI Assessment  BMI 35-39.9: obesity grade II         Labs/Tests/Procedures/Meds     Row Name 02/24/21 1147          Labs/Procedures/Meds    Lab Results Reviewed  reviewed     Lab Results Comments  no new labs to view        Medications    Pertinent Medications Reviewed  reviewed         Physical Findings     Row Name 02/24/21 1147          Physical Findings    Overall Physical Appearance  on oxygen therapy;obese           Nutrition Prescription Ordered     Row Name 02/24/21 1149          Nutrition Prescription PO    Current PO Diet  Regular     Fluid Consistency  Thin     Common Modifiers  Cardiac         Evaluation of Received Nutrient/Fluid Intake     Row Name 02/24/21 1214          Nutrient/Fluid Evaluation    Number of Days Evaluated  2 days     Additional Documentation  Fluid Intake Evaluation (Group)        Fluid Intake Evaluation    Oral Fluid (mL)  2250        PO Evaluation    Number of Meals  6     % PO Intake  100               Problem/Interventions:  Problem 1     Row Name 02/24/21 1214          Nutrition Diagnoses Problem 1    Problem 1  Nutrition Appropriate for Condition at this Time     Etiology (related to)  Factors  Affecting Nutrition     Reported/Observed By  RN     Appetite  Good     Signs/Symptoms (evidenced by)  PO Intake     Percent (%) intake recorded  100 %     Over number of meals  6               Intervention Goal     Row Name 02/24/21 1215          Intervention Goal    General  Maintain nutrition;Meet nutritional needs for age/condition;Reduce/improve symptoms;Disease management/therapy     PO  Maintain intake;Continue positive trend;Meet estimated needs     Weight  Maintain weight         Nutrition Intervention     Row Name 02/24/21 1216          Nutrition Intervention    RD/Tech Action  Follow Tx progress;Care plan reviewd           Education/Evaluation     Row Name 02/24/21 1216          Education    Education  No discharge needs identified at this time        Monitor/Evaluation    Monitor  Per protocol           Electronically signed by:  Jennifer Henry  02/24/21 12:16 CST

## 2021-02-25 PROCEDURE — 97116 GAIT TRAINING THERAPY: CPT

## 2021-02-25 PROCEDURE — 97535 SELF CARE MNGMENT TRAINING: CPT

## 2021-02-25 PROCEDURE — 25010000002 ENOXAPARIN PER 10 MG: Performed by: INTERNAL MEDICINE

## 2021-02-25 NOTE — PROGRESS NOTES
YENNY Dodson APRN        Internal Medicine Progress Note    2/25/2021   09:34 CST    Name:  Santiago Spence  MRN:    [id number]     Acct:     [id number]   Room:  [address] [place] [address] Day: 0     Admit Date: 2/16/2021  5:28 PM  PCP: Meredith Lloyd APRN    Subjective:     C/C: weakness, shortness of breath    Interval History: Status:  Improved. Up to chair. No family at bedside. Independent for ADLs. Tolerating oxygen weaning. 02 sats stable with 02 at 1 lpm. Denies pain. Anxious to progress and prepare for discharge.     Review of Systems   Constitution: Negative for chills, decreased appetite, malaise/fatigue, weight gain and weight loss.   HENT: Negative for congestion, ear discharge, hoarse voice and tinnitus.    Eyes: Negative for blurred vision, discharge, visual disturbance and visual halos.   Cardiovascular: Negative for chest pain, claudication, dyspnea on exertion, irregular heartbeat, leg swelling, orthopnea and paroxysmal nocturnal dyspnea.   Respiratory: Negative for cough, shortness of breath, sputum production and wheezing.    Endocrine: Negative for cold intolerance, heat intolerance and polyuria.   Hematologic/Lymphatic: Negative for adenopathy. Does not bruise/bleed easily.   Skin: Negative for dry skin, itching and suspicious lesions.   Musculoskeletal: Negative for arthritis, back pain, falls, joint pain, muscle weakness and myalgias.   Gastrointestinal: Negative for abdominal pain, constipation, diarrhea, dysphagia and hematemesis.   Genitourinary: Negative for bladder incontinence, dysuria and frequency.   Neurological: Negative for aphonia, disturbances in coordination, dizziness and weakness.   Psychiatric/Behavioral: Negative for altered mental status, depression, memory loss and substance abuse. The patient does not have insomnia and is not nervous/anxious.          Medications:     Allergies: No Known Allergies    Current Meds:   Current  Facility-Administered Medications:   •  acetaminophen (TYLENOL) tablet 650 mg, 650 mg, Oral, Q4H PRN **OR** acetaminophen (TYLENOL) suppository 650 mg, 650 mg, Rectal, Q4H PRN, Kojo Hanson MD  •  albuterol sulfate HFA (PROVENTIL HFA;VENTOLIN HFA;PROAIR HFA) inhaler 2 puff, 2 puff, Inhalation, 4x Daily - RT, Kojo Hanson MD  •  amLODIPine (NORVASC) tablet 5 mg, 5 mg, Oral, Q24H, Kojo Hanson MD  •  ascorbic acid (VITAMIN C) tablet 500 mg, 500 mg, Oral, BID, Kojo Hanson MD  •  aspirin chewable tablet 81 mg, 81 mg, Oral, Daily, Kojo Hanson MD  •  atorvastatin (LIPITOR) tablet 10 mg, 10 mg, Oral, Nightly, Kojo Hanson MD  •  benzonatate (TESSALON) capsule 200 mg, 200 mg, Oral, Q4H PRN, Kojo Hanson MD  •  cholecalciferol (VITAMIN D3) tablet 1,000 Units, 1,000 Units, Oral, Daily, Kojo Hanson MD  •  colchicine tablet 0.6 mg, 0.6 mg, Oral, Daily, Kojo Hanson MD  •  dextromethorphan polistirex ER (DELSYM) 30 MG/5ML oral suspension 60 mg, 60 mg, Oral, Q12H PRN, Kojo Hanson MD  •  docusate sodium (COLACE) capsule 100 mg, 100 mg, Oral, Daily, Kojo Hanson MD  •  enoxaparin (LOVENOX) syringe 40 mg, 40 mg, Subcutaneous, Q12H, Kojo Hanson MD  •  famotidine (PEPCID) tablet 20 mg, 20 mg, Oral, BID AC, Kojo Hanson MD  •  hydroCHLOROthiazide (HYDRODIURIL) tablet 25 mg, 25 mg, Oral, Daily, Kojo Hanson MD  •  labetalol (NORMODYNE,TRANDATE) injection 10 mg, 10 mg, Intravenous, Q4H PRN, Kojo Hanson MD  •  melatonin tablet 3 mg, 3 mg, Oral, Nightly, Kojo Hanson MD  •  multivitamin with minerals 1 tablet, 1 tablet, Oral, Daily, Kojo Hanson MD  •  ondansetron (ZOFRAN) tablet 4 mg, 4 mg, Oral, Q6H PRN **OR** ondansetron (ZOFRAN) injection 4 mg, 4 mg, Intravenous, Q6H PRN, Kojo Hanson MD  •  polyethylene glycol (MIRALAX) packet 17 g, 17 g, Oral, Daily,  "Kojo Hanson MD  •  sodium chloride 0.9 % flush 10 mL, 10 mL, Intravenous, Q12H, Kojo Hanson MD  •  sodium chloride 0.9 % flush 10 mL, 10 mL, Intravenous, PRN, Kojo Hanson MD  •  sodium chloride nasal spray 1 spray, 1 spray, Each Nare, PRN, Kojo Hanson MD  •  verapamil SR (CALAN-SR) CR tablet 180 mg, 180 mg, Oral, Q24H, Kojo Hanson MD  •  Shaunna's amazing butt cream, , Topical, TID, Abbey Haddad APRN  •  Shaunna's amazing butt cream, , Topical, PRN, Abbey Haddad APRN  •  zinc sulfate (ZINCATE) capsule 220 mg, 220 mg, Oral, Daily, Kojo Hanson MD    Data:     Code Status:    There are no questions and answers to display.       Family History   Problem Relation Age of Onset   • Hypertension Mother    • Coronary artery disease Father        Social History     Socioeconomic History   • Marital status:      Spouse name: Not on file   • Number of children: Not on file   • Years of education: Not on file   • Highest education level: Not on file   Tobacco Use   • Smoking status: Never Smoker   • Smokeless tobacco: Former User       Vitals:  Ht 162.6 cm (64\")   Wt 95.3 kg (210 lb)   BMI 36.05 kg/m²   T 98.0 P 91 R 16 /67 Sp02 95% (1 lpm)          I/O (24Hr):  No intake or output data in the 24 hours ending 02/25/21 0934    Labs and imaging:      No results found for this or any previous visit (from the past 12 hour(s)).        Physical Examination:        Physical Exam  Vitals signs and nursing note reviewed.   Constitutional:       Appearance: He is well-developed.   HENT:      Head: Normocephalic and atraumatic.      Nose: Nose normal.   Eyes:      Pupils: Pupils are equal, round, and reactive to light.   Neck:      Musculoskeletal: Normal range of motion and neck supple.   Cardiovascular:      Rate and Rhythm: Normal rate and regular rhythm.      Heart sounds: Normal heart sounds.   Pulmonary:      Effort: Pulmonary " effort is normal.      Breath sounds: Normal breath sounds.   Abdominal:      General: Bowel sounds are normal.      Palpations: Abdomen is soft.   Musculoskeletal: Normal range of motion.   Skin:     General: Skin is warm and dry.   Neurological:      Mental Status: He is alert and oriented to person, place, and time.      Deep Tendon Reflexes: Reflexes are normal and symmetric.   Psychiatric:         Behavior: Behavior normal.           Assessment:            * No active hospital problems. *    Past Medical History:   Diagnosis Date   • Arthritis    • Cancer (CMS/HCC)    • Colon cancer (CMS/HCC)    • Hypertension    • Skin cancer         Plan:        1. Acute hypoxic respiratory failure  2. COVID-19 pneumonia  3. HTN  4. GERD    Continue current treatment. Monitor counts. Increase activity.  Continue oxygen weaning as tolerated. Discharge planning for discharge to home in am.        Electronically signed by VALERIA Peña on 2/25/2021 at 09:34 CST

## 2021-02-26 PROCEDURE — 25010000002 ENOXAPARIN PER 10 MG: Performed by: INTERNAL MEDICINE

## 2021-02-26 NOTE — DISCHARGE SUMMARY
Valentin Hanson M.D.  VALERIA Leyva      Internal Medicine Discharge Summary    Patient ID: Santiago Spence  MRN: [id number]     Acct:  [id number]       Patient's PCP: Meredith Lloyd APRN    Admit Date: 2/16/2021     Discharge Date:   2/26/21    Admitting Physician: Kojo Hanson MD    Discharge Physician: VALERIA Peña     Active Discharge Diagnoses:  1. Acute hypoxic respiratory failure  2. COVID-19 pneumonia  3. HTN  4. GERD    Hospital Problems    * No active hospital problems. *     Past Medical History:   Diagnosis Date   • Arthritis    • Cancer (CMS/HCC)    • Colon cancer (CMS/HCC)    • Hypertension    • Skin cancer        The patient was seen and examined on the day of discharge and this discharge summary is in conjunction with any daily progress note from day of discharge.    Code Status:    There are no questions and answers to display.       Hospital Course: Santiago Spence is a  70 y.o.  male who presents with need for continued oxygen weaning and rehabilitation efforts. The patient had been in his usual state of health when he developed low grade fevers with cough and congestion. He was seen as an outpatient by his PCP and treated with zithromax, doxycycline and decadron. Unfortunately, his symptoms continued to worsen and he presented to an outlying ER where he was found to have significant hypoxia with 02 sats in the 80s. D dimer elevated. CT findings showed no PE, but bilateral ground glass opacities consistent with COVID pneumonia. Covid test was performed and found positive on 2/2. The facility where he presented had no ICU beds available and the patient was transferred to [facility] for higher level of care. He was requiring 02 at 15 lpm on intake. He was treated with Remdesivir, atorvastatin, zinc, vitamin C, IV steroids, convalescent plasma, and supplemental oxygen. He was seen in consultation by ID and pulmonology. He was transitioned to  Vapotherm due to increased oxygen demand. The patient was started on empiric IV antibiotics for possible superimposed bacterial pneumonia. He has tolerated oxygen weaning and transferred to our facility for continued oxygen weaning and rehabilitation efforts.   The patient tolerated oxygen weaning and rehabilitation efforts. Counts remained stable. Removed from enhanced isolation precautions on 2/21. He has continued to tolerate weaning efforts off of all treatment. 02 sats stable with 02 at 1-2 lpm. He has been sleeping with a bipap at times and reports that he had been told in the past he likely had sleep apnea and would need a sleep study. He is agreeable to this now. He is now felt stable for discharge to home with continued supplemental oxygen and oxygen weaning and close outpatient follow up.      Consults:  Dr. Ewing (pulmonology)    Disposition: home     Physical Exam  Vitals signs and nursing note reviewed.   Constitutional:       Appearance: He is well-developed.   HENT:      Head: Normocephalic and atraumatic.      Nose: Nose normal.   Eyes:      Pupils: Pupils are equal, round, and reactive to light.   Neck:      Musculoskeletal: Normal range of motion and neck supple.   Cardiovascular:      Rate and Rhythm: Normal rate and regular rhythm.      Heart sounds: Normal heart sounds.   Pulmonary:      Effort: Pulmonary effort is normal.      Breath sounds: Normal breath sounds.   Abdominal:      General: Bowel sounds are normal.      Palpations: Abdomen is soft.   Musculoskeletal: Normal range of motion.   Skin:     General: Skin is warm and dry.   Neurological:      Mental Status: He is alert and oriented to person, place, and time.      Deep Tendon Reflexes: Reflexes are normal and symmetric.   Psychiatric:         Behavior: Behavior normal.     Discharged Condition: Stable    Follow Up: PCP 1 week  Outpatient sleep study    Diet: Diet Regular; Cardiac    Discharge Medications:   See computer generated  medication reconciliation form    Time Spent on discharge is  32 minutes in patient examination, evaluation, patient/family counseling as well as medication reconciliation, prescriptions for required medications, discharge plan and follow up.     Electronically signed by VALERIA Peña on 2/26/2021 at 08:52 CST     I have discussed the care of Santiago Spence, including pertinent history and exam findings, with the nurse practitioner.    I have seen and examined the patient and the key elements of all parts of the encounter have been performed by me.  I agree with the assessment, plan and orders as documented by VALERIA Leyva, after I modified the exam findings and the plan of treatments and the final version is my approved version of the assessment.        Electronically signed by Kojo Hanson MD on 2/26/2021 at 22:31 CST

## 2022-06-01 NOTE — PROGRESS NOTES
PULMONARY AND CRITICAL CARE PROGRESS NOTE - [facility]    Patient: Santiago Spence  1950   MR# [id number]   Acct# [id number]  02/08/21   10:14 CST  Referring Provider: Jose Russell MD    Chief Complaint: Covid-19 pneumonia, acute respiratory failure    Interval history:   Patient was seen in the intensive care unit in Covid isolation from outside the glass door to reduce the risk of cross infection exposure and to minimize use of PPE.  The patient is sitting up in the chair at bedside eating breakfast on Vapotherm 35 L, 95% FiO2.  O2 sat 93%, respiratory rate 13.  He is using BiPAP at night.  No other aggravating or alleviating factors.     Meds:  albuterol sulfate HFA, 2 puff, Inhalation, 4x Daily - RT  ALPRAZolam, 0.25 mg, Oral, TID  amLODIPine, 5 mg, Oral, Q24H  ascorbic acid, 500 mg, Oral, BID  aspirin, 81 mg, Oral, Daily  atorvastatin, 10 mg, Oral, Nightly  cholecalciferol, 1,000 Units, Oral, Daily  colchicine, 0.6 mg, Oral, Daily  dexamethasone, 6 mg, Oral, Q12H    Or  dexamethasone, 6 mg, Intravenous, Q12H  docusate sodium, 100 mg, Oral, Daily  enoxaparin, 40 mg, Subcutaneous, Q24H  famotidine, 20 mg, Oral, BID AC  hydroCHLOROthiazide, 25 mg, Oral, Daily  melatonin, 3 mg, Oral, Nightly  meropenem, 1 g, Intravenous, Q8H  multivitamin and minerals, 15 mL, Oral, Daily  polyethylene glycol, 17 g, Oral, Daily  sodium chloride, 10 mL, Intravenous, Q12H  verapamil SR, 180 mg, Oral, Q24H  zinc sulfate, 220 mg, Oral, Daily         Review of Systems:   Review of Systems   Constitutional: Negative for chills and fever.   Respiratory: Positive for shortness of breath.    Cardiovascular: Negative for chest pain.   Gastrointestinal: Negative for diarrhea, nausea and vomiting.   :      Physical Exam:  SpO2 Percentage    02/08/21 0845 02/08/21 0900 02/08/21 1000   SpO2: (!) 89% (!) 88% 90%     Temp:  [96 °F (35.6 °C)-98.7 °F (37.1 °C)] 97.3 °F (36.3 °C)  Heart Rate:  [60-96] 75  Resp:  [16-29]      The following are suggestions to help you with upper respiratory symptoms.    Congestion:    Nasal Saline  Nasal saline is available over the counter. There are several different commercial preparations such as Ocean spray and [place] spray. There is no limit on the use of Nasal saline. Saline is used by snorting the mist up into the nose then later gently blowing the nose to get rid of any secretions that it has loosened.     Mucous Thinners and Decongestants  Mucous thinners and decongestants are used to shrink down the tissues and promote sinus drainage. There are multiple prescription and over-the-counter medications available. A mucous thinner will tend to be drying unless you are also drinking plenty of water when taking these. If you have high blood pressure, it is very important to monitor your pressure while on decongestants. The mucous thinner/decongestant combinations are typically given twice per day. However, some people will be unable to tolerate these at night and should only take them once per day.    Decongestant Nasal Sprays  Over-the-counter decongestant nasal spray such as Afrin, may be helpful as an initial step in treating upper respiratory infections. This spray can be used for up to approximately 3 days and is used no more than twice per day. Topical nasal decongestant spray for longer than 5 days will result in a physical addiction, in which the nasal lining will become significantly swollen and irritated until the spray is used again. May cause elevated blood pressure    Use pseudoephedrine (behind the counter)  for sinus pressure and congestion- Pseudoephedrine 30 mg up to 240 mg /day. Common brands include Sudafed, Zephrex-D Wal-phed.  Warning: It can raise your blood pressure and give you palpitations, avoid with history of high blood pressure, palpitations, and severe cardiac disease.  Coricidin HBP is okay to use if you have high blood pressure.     Nasal Steroids  Nasal steroid  18  BP: ()/(51-97) 118/58    Intake/Output Summary (Last 24 hours) at 2/8/2021 1014  Last data filed at 2/8/2021 0800  Gross per 24 hour   Intake 560 ml   Output 1255 ml   Net -695 ml     Based on nursing assessment.    GENERAL/CONSTITUTIONAL: No distress.   HEENT: atraumatic, normocephalic  NOSE: normal, vapotherm  NECK: jugular veins nondistended  CHEST: no paradox, no retractions.  No respiratory distress.   CARDIAC: Sinus rhythm  ABDOMEN: nondistended  : Deferred  EXTREMITIES: Mild lower extremity edema.  NEURO: Awake and alert, waving  SKIN: no jaundice.  No rash    Laboratory Data:  Results from last 7 days   Lab Units 02/08/21  0554 02/07/21  0629 02/06/21  0456   WBC 10*3/mm3 14.38* 16.47* 14.46*   HEMOGLOBIN g/dL 14.8 16.6 16.2   PLATELETS 10*3/mm3 305 355 295     Results from last 7 days   Lab Units 02/08/21  0554 02/07/21  0629 02/06/21  0456 02/05/21  0445  02/02/21  0424   SODIUM mmol/L 133* 138 139 138   < > 137   POTASSIUM mmol/L 4.4 4.2 4.1 4.0   < > 4.0   BUN mg/dL 26* 31* 40* 35*   < > 20   CREATININE mg/dL 0.64* 0.72* 0.88 0.71*   < > 0.79   CRP mg/dL 0.57*  --  2.45*  --    < > 29.57*   PROCALCITONIN ng/mL  --   --   --   --   --  0.24   FERRITIN ng/mL 490.90*  --  506.30*  --    < > 525.00*   D DIMER QUANT mg/L (FEU)  --  0.77*  --  0.62*   < > 0.79*    < > = values in this interval not displayed.     Results from last 7 days   Lab Units 02/06/21  0325 02/05/21  0455 02/04/21  0520   PH, ARTERIAL pH units 7.453* 7.438 7.444   PCO2, ARTERIAL mm Hg 47.8* 47.3* 44.8   PO2 ART mm Hg 98.8 70.3* 67.0*   FIO2 % 90 100 100     Blood Culture   Date Value Ref Range Status   02/02/2021 No growth at 2 days  Preliminary   02/02/2021 No growth at 2 days  Preliminary     Respiratory Culture   Date Value Ref Range Status   02/02/2021   Preliminary    Heavy growth (4+) Normal Respiratory Jazz: NO S.aureus/MRSA or Pseudomonas aeruginosa     Recent films:  No radiology results for the last day  Films  medications such as Flonase are useful for upper respiratory infections, allergies, and sensitivities to airborne irritants. Unfortunately, they do not begin to work for 1-2 days, and they do not reach their maximum benefit for approximately 2-3 weeks. Initial therapy is typically 2 puffs per nostril twice per day. This should be used for only a few days, then the maintenance dosage is one or two puffs per nostril once per day. This can be done at any time of the day. The most effective way to use any nasal medication is to look down at your toes when spraying it in. Aim slightly away from the septum (dividing plate between the nostrils), and gently inhale. This ensures that the spray will go into the sinus cavities and not straight up into the nose. A good way to avoid spraying onto the septum is to use the right hand to spray into the left nostril, and vice versa for the right nostril. Occasionally, nasal steroids can increase the risk of nosebleeds, but in general they are very well tolerated. If you develop a bloody nose, stop using the medication immediately.    Clear Runny Nose/Allergic Rhinitis:  Use an antihistamine to help dry you out.   Antihistamines  Antihistamines are available both over-the-counter and as a prescription. There are also various decongestant and antihistamine combinations available such as Claritin, Allegra, and Zyrtec. It is best to take any antihistamine-decongestant combination in the morning to avoid insomnia. Zyrtec should probably be taken at night, in order to reduce the chance of sleepiness during the daytime. If there is a significant infection present and secretions are already thickened, it is recommended to discontinue antihistamines and use a mucous thinner/decongestant combination.      Oral Steroids  Oral steroids can be used with more sever infections. Often, they are the only medications that will reduce the symptoms of pressure and allow the nasal sinuses to drain.  These are best taken on a full stomach and earlier in the day is better. They may give you some irritability, stomach upset, or hyperactivity. This can also interfere with sleep.     A person who has high blood pressure or diabetes should be very careful to monitor their blood pressure or blood glucose while taking steroids. Steroids can have multiple side effects especially when taken long-term. Short-term doses are usually very well tolerated and extremely effective in controlling the symptoms associated with acute and chronic sinus infections and severe allergies. The use of steroids for greater than approximately seven days requires a tapering down in order to discontinue them. You should not abruptly stop your steroid if you have been taking the same dose for greater than one week.    Antibiotic Treatment  Finally, when all of these other measures have failed, and a bacterial infection is present, an antibiotic will be prescribed. The most common symptoms of acute sinusitis of a bacterial nature are pain, pressure, and thick and colored nasal drainage. However, not all colored drainage means that there is a bacterial infection present. According to the Center for Disease Control, only 2% of colds will progress to result in bacterial sinusitis. Most upper respiratory infections should NOT be treated with antibiotics. Antibiotics should be reserved for upper respiratory infections which last longer than 10 days, or which worsen after 5 to 7 days of treatment. The use of antibiotics for nonbacterial upper respiratory infections has resulted in a severe problem with the emergence of bacteria which are resistant to multiple forms of antibiotics, and some bacteria are currently only treatable with intravenous antibiotics.    Body Aches/Pains/Fever  For patients who are not allergic to and are not on anticoagulants, you can alternate Tylenol every 4 hours and Motrin every 6 hours for fever above 100.4F and/or pain.   reviewed personally by me.  My interpretation: None today    Pulmonary Assessment:    1. SARS Covid-19 viral pneumonia -remdesivir completed  2. Acute respiratory failure with hypoxia related to COVID-19  3. Bilateral lung infiltrates  4. History of colon cancer  5. Obese  6. Obstructive sleep apnea, does not use his CPAP    Recommend:   · Continue supplemental oxygen for O2 sat 90% to 94%  · Continue Vapotherm and BiPAP as needed  · Proning candidate: Yes if able to coordinate  · Dexamethasone day 5 of 10  · DVT prophylaxis-Lovenox  · Stress ulcer prophylaxis-Pepcid  · Zinc, vitamin D, vitamin C, and melatonin  · Antibiotic-Merrem  · IS  · CXR today     Electronically signed by:   VALERIA Lowry    2/8/2021 10:14 CST     ATTESTATION OF CLINICAL NOTE:  I have reviewed the notes, assessments, and/or procedures performed by VALERIA Lowry, I concur with her/his documentation of Santiago Spence.  He was seen in the intensive care unit in [facility] from outside the [place] to reduce the risk of cross infection and exposure and to minimize the use of PPE.    Patient is resting comfortably and sitting up in the chair with Vapotherm 40 L flow 100% FiO2 and he did use BiPAP at night.  He actually was able to cut back the Vapotherm to 35 L and 90% but after breakfast he was more hypoxic and needed to go up on the flow and FiO2.  However patient is feeling overall better although he is hypoxic and denies any shortness of breath cough chest pain or any other new complaints.  He is currently getting full treatment for COVID-19 and supportive respiratory care.    Physical examination nursing assessment.  Patient is obese  male sitting up in the bed in no acute distress.  HEENT: Atraumatic normocephalic.  Neck was supple no jugular venous distention noted..  Heart: Has rhythm no gallop.  Lungs: Diminished air entry and few crackles.  Abdomen: Soft nondistended nontender.  Extremities: No visible  For patients who are allergic or intolerant to NSAIDS, have gastritis, gastric ulcers, or history of GI bleeds, are pregnant, or are on anticoagulant therapy, you can take Tylenol every 4 hours as needed for fever above 100.4F and/or pain.     Maintain adequate hydration -  Rest and keep yourself/patient well hydrated. For adults, it is recommended to drink at least 8 glasses of water daily.  This may help thin secretions and soothe the respiratory mucosa.     Sore Throat  Perform warm, salt water gargles (1/2 tsp salt to 1 cup warm water) to help reduce inflammation and throat discomfort. Chloraseptic sprays and throat lozenges will also help with your throat pain.    COUGH  A viral cough may linger for 3 to 4 weeks but should steadily improve over time. Coughing is the body's natural way to clear mucus and help get rid of bacteria and viruses. Therefore, cough suppressants are usually not recommended.      Use mucinex (guaifenisin) up to 2400mg/day to help clear and break up/loosen mucus/congestion from the chest when you have a cold or flu.      Common cough suppressants include the ingredient dextromethorphan or DM, (such as Mucinex DM) available over-the-counter and can be used for cough to stop the tickle in the back of your throat.      ? Honey may be beneficial, especially on nocturnal cough 1 to 2 teaspoons can be taken straight or diluted in tea, juice or other liquid.    The antioxidants in honey are an important contributor to its decongestant properties. Generally speaking, darker honey contains more antioxidants. Buckwheat and avocado honey are particularly good choices. If these honeys are not available in your area, choose the darkest honey you can find.    Take all medications as directed. If you have been prescribed antibiotics, make sure to complete them.     If your condition fails to improve in a timely manner, you should receive another evaluation by your Primary Care Provider to discuss your  ankle edema.  Neurologic: Grossly intact.  Skin: No breakdown.    New current treatment plan with supportive respiratory care and bronchodilator treatment.  He is currently on Vapotherm and using BiPAP at night.  We will continue titrating FiO2 and oxygen flow to keep oxygen more than 92%.  Plan for outpatient pulmonary clinic visit with sleep study for untreated sleep apnea.  Continue treatment for COVID-19 with zinc vitamin C vitamin D melatonin and dexamethasone.  Continue self proning as tolerated during the day.  He is currently getting antibiotic coverage which may be deescalated soon.  Incentive spirometry and flutter valve to improve pulmonary compliance and clearance.  Repeat labs and imaging studies from time to time.  Continue respiratory and contact isolation.  Pulmonary team will continue following him and make further recommendations.    I have seen and examined patient personally, performing a face-to-face diagnostic evaluation with plan of care reviewed and developed with APRN and nursing staff. I have addended and/or modified the above history of present illness, physical examination, and assessment and plan to reflect my findings and impressions. Essential elements of the care plan were discussed with APRN above.  Agree with findings and assessment/plan as documented above.    Cora Ewing MD  Pulmonologist/Intensivist  2/8/2021 12:49 CST    concerns or return to urgent care for a recheck.      **You must understand that you have received Urgent Care treatment only and that you may be released before all of your medical problems are known or treated. You, the patient, are responsible to arrange for follow-up care as instructed. If your condition worsens at any time, you should report immediately to your nearest Emergency Department for further evaluation.          Ear Infection    General Information    An ear infection can cause ear pain and fever. You might also have trouble hearing from fluid buildup in the middle ear behind the eardrum.  Most ear infections are caused by viruses, but some are caused by bacteria. The doctor will wait to see if you get better on your own if they think the cause is a virus. The doctor will order antibiotic if they think the cause is a bacteria. Antibiotics kill bacteria, but they do not work on viruses.  If the doctor orders antibiotics, be sure to take all of them, even if you start to feel better.  What care is needed at home?   Call your regular doctor to let them know you were at Urgent Care. Make a follow-up appointment if you were told to.  Do not put anything in your ear unless it was ordered by the doctor.  You may want to take medicines like ibuprofen, naproxen, or acetaminophen to help with pain.  When do I need to call the doctor?   Your symptoms are not getting better in 2 to 3 days.  You continue to have problems hearing after 2 to 3 weeks.  You have a fever of 100.4°F (38°C) or higher or chills.  You have discharge or fluid coming from your ear.  You have new or worsening symptoms.  Last Reviewed Date   2020-12-16  Consumer Information Use and Disclaimer   This information is not specific medical advice and does not replace information you receive from your health care provider. This is only a brief summary of general information. It does NOT include all information about conditions, illnesses, injuries,  tests, procedures, treatments, therapies, discharge instructions or life-style choices that may apply to you. You must talk with your health care provider for complete information about your health and treatment options. This information should not be used to decide whether or not to accept your health care providers advice, instructions or recommendations. Only your health care provider has the knowledge and training to provide advice that is right for you.  Copyright   Copyright © 2021 [employer] and its affiliates and/or licensors. All rights reserved.